# Patient Record
Sex: FEMALE | Race: WHITE | Employment: UNEMPLOYED | URBAN - METROPOLITAN AREA
[De-identification: names, ages, dates, MRNs, and addresses within clinical notes are randomized per-mention and may not be internally consistent; named-entity substitution may affect disease eponyms.]

---

## 2017-01-23 ENCOUNTER — TELEPHONE (OUTPATIENT)
Dept: FAMILY MEDICINE CLINIC | Facility: CLINIC | Age: 47
End: 2017-01-23

## 2017-01-23 RX ORDER — PREDNISONE 20 MG/1
TABLET ORAL DAILY
Qty: 30 TABLET | Refills: 0 | Status: SHIPPED | OUTPATIENT
Start: 2017-01-23 | End: 2017-01-30

## 2017-01-23 NOTE — TELEPHONE ENCOUNTER
Prednisone 20mg daily and fu w dr Tierra Coker it    If 20mg not work- rec 40mg daily    Ok to script prednsione 20mg at 30 tabs

## 2017-01-23 NOTE — TELEPHONE ENCOUNTER
Patient notified, verbalized understanding. Pharmacy location confirmed with patient and order placed.

## 2017-01-23 NOTE — TELEPHONE ENCOUNTER
Patient states that she had been seeing Dr. Jean Baptiste Mini rheum for her Lupus but he is in between jobs and she is unable to see him until the middle of February. She states that her lupus is acting up.  She is having heaviness in her chest which radiates to her sh

## 2017-03-07 ENCOUNTER — TELEPHONE (OUTPATIENT)
Dept: FAMILY MEDICINE CLINIC | Facility: CLINIC | Age: 47
End: 2017-03-07

## 2017-03-07 NOTE — TELEPHONE ENCOUNTER
Patient states starting 3/4/17 she started to experience full sensation in upper mid abdomen, after eating she has indigestion. Patient states it is not painful but just uncomfortable after eating.  Patient has not taken anything for indigestion at this juan

## 2017-03-11 ENCOUNTER — OFFICE VISIT (OUTPATIENT)
Dept: FAMILY MEDICINE CLINIC | Facility: CLINIC | Age: 47
End: 2017-03-11

## 2017-03-11 ENCOUNTER — LAB ENCOUNTER (OUTPATIENT)
Dept: LAB | Age: 47
End: 2017-03-11
Attending: PHYSICIAN ASSISTANT
Payer: COMMERCIAL

## 2017-03-11 VITALS
DIASTOLIC BLOOD PRESSURE: 60 MMHG | TEMPERATURE: 97 F | HEART RATE: 68 BPM | WEIGHT: 136 LBS | RESPIRATION RATE: 16 BRPM | BODY MASS INDEX: 22.94 KG/M2 | HEIGHT: 64.5 IN | SYSTOLIC BLOOD PRESSURE: 110 MMHG

## 2017-03-11 DIAGNOSIS — R10.13 EPIGASTRIC PAIN: ICD-10-CM

## 2017-03-11 DIAGNOSIS — R10.13 EPIGASTRIC PAIN: Primary | ICD-10-CM

## 2017-03-11 LAB
ALBUMIN SERPL-MCNC: 3.4 G/DL (ref 3.5–4.8)
ALP LIVER SERPL-CCNC: 68 U/L (ref 39–100)
ALT SERPL-CCNC: 17 U/L (ref 14–54)
AMYLASE: 75 U/L (ref 25–115)
AST SERPL-CCNC: 10 U/L (ref 15–41)
BASOPHILS # BLD AUTO: 0.08 X10(3) UL (ref 0–0.1)
BASOPHILS NFR BLD AUTO: 1.1 %
BILIRUB SERPL-MCNC: 0.4 MG/DL (ref 0.1–2)
BUN BLD-MCNC: 17 MG/DL (ref 8–20)
CALCIUM BLD-MCNC: 8.5 MG/DL (ref 8.3–10.3)
CHLORIDE: 107 MMOL/L (ref 101–111)
CO2: 29 MMOL/L (ref 22–32)
CREAT BLD-MCNC: 0.87 MG/DL (ref 0.55–1.02)
EOSINOPHIL # BLD AUTO: 0.07 X10(3) UL (ref 0–0.3)
EOSINOPHIL NFR BLD AUTO: 1 %
ERYTHROCYTE [DISTWIDTH] IN BLOOD BY AUTOMATED COUNT: 12.8 % (ref 11.5–16)
GLUCOSE BLD-MCNC: 72 MG/DL (ref 70–99)
HCT VFR BLD AUTO: 37.8 % (ref 34–50)
HGB BLD-MCNC: 12.9 G/DL (ref 12–16)
IMMATURE GRANULOCYTE COUNT: 0.06 X10(3) UL (ref 0–1)
IMMATURE GRANULOCYTE RATIO %: 0.8 %
LIPASE: 335 U/L (ref 73–393)
LYMPHOCYTES # BLD AUTO: 1.65 X10(3) UL (ref 0.9–4)
LYMPHOCYTES NFR BLD AUTO: 23.4 %
M PROTEIN MFR SERPL ELPH: 6.4 G/DL (ref 6.1–8.3)
MCH RBC QN AUTO: 30.7 PG (ref 27–33.2)
MCHC RBC AUTO-ENTMCNC: 34.1 G/DL (ref 31–37)
MCV RBC AUTO: 90 FL (ref 81–100)
MONOCYTES # BLD AUTO: 0.74 X10(3) UL (ref 0.1–0.6)
MONOCYTES NFR BLD AUTO: 10.5 %
NEUTROPHIL ABS PRELIM: 4.46 X10 (3) UL (ref 1.3–6.7)
NEUTROPHILS # BLD AUTO: 4.46 X10(3) UL (ref 1.3–6.7)
NEUTROPHILS NFR BLD AUTO: 63.2 %
PLATELET # BLD AUTO: 179 10(3)UL (ref 150–450)
POTASSIUM SERPL-SCNC: 3.9 MMOL/L (ref 3.6–5.1)
RBC # BLD AUTO: 4.2 X10(6)UL (ref 3.8–5.1)
RED CELL DISTRIBUTION WIDTH-SD: 41.7 FL (ref 35.1–46.3)
SODIUM SERPL-SCNC: 142 MMOL/L (ref 136–144)
WBC # BLD AUTO: 7.1 X10(3) UL (ref 4–13)

## 2017-03-11 PROCEDURE — 82150 ASSAY OF AMYLASE: CPT

## 2017-03-11 PROCEDURE — 83690 ASSAY OF LIPASE: CPT

## 2017-03-11 PROCEDURE — 36415 COLL VENOUS BLD VENIPUNCTURE: CPT

## 2017-03-11 PROCEDURE — 85025 COMPLETE CBC W/AUTO DIFF WBC: CPT

## 2017-03-11 PROCEDURE — 99213 OFFICE O/P EST LOW 20 MIN: CPT | Performed by: PHYSICIAN ASSISTANT

## 2017-03-11 PROCEDURE — 80053 COMPREHEN METABOLIC PANEL: CPT

## 2017-03-11 NOTE — PROGRESS NOTES
[facility] Internal Medicine Progress Note    CC:  Patient presents with:  Epigastric Pain: pressure with eating x 1 week       HPI:   HPI  Epigastric pain x 1 week  Started last Friday into Saturday with epigastric fullness after eating  It didn visit. Review of Systems :  Review of Systems   Constitutional: Negative for fever and chills. Respiratory: Negative for cough, chest tightness, shortness of breath and wheezing. Cardiovascular: Negative for chest pain.    Gastrointestinal: Positive Patient/Caregiver Education: Patient/Caregiver Education: There are no barriers to learning. Medical education done. Outcome: Patient verbalizes understanding.     Problem List:  Patient Active Problem List:     Mitral regurgitation     Antiphospholipid s

## 2017-03-11 NOTE — PATIENT INSTRUCTIONS
Thank you for choosing Romy Grande PA-C at [place] [address]  To Do: Brenda Castro  1. Pt to get abdominal ultrasound  2. Pt to continue omeprazole  3.  Pt to follow-up with GI, as scheduled    • Please signup for MY CHART, which is electronic healthier and to improve your quality of life.     Referrals, and Radiology Information:    If your insurance requires a referral to a specialist, please allow 5 business days to process your referral request.    If Yoseph Pepe PA-C orders a CT or MRI

## 2017-03-20 ENCOUNTER — HOSPITAL ENCOUNTER (OUTPATIENT)
Dept: ULTRASOUND IMAGING | Age: 47
Discharge: HOME | End: 2017-03-20
Attending: PHYSICIAN ASSISTANT
Payer: COMMERCIAL

## 2017-03-20 DIAGNOSIS — R10.13 EPIGASTRIC PAIN: ICD-10-CM

## 2017-03-20 PROCEDURE — 76700 US EXAM ABDOM COMPLETE: CPT

## 2017-03-28 ENCOUNTER — TELEPHONE (OUTPATIENT)
Dept: FAMILY MEDICINE CLINIC | Facility: CLINIC | Age: 47
End: 2017-03-28

## 2017-03-28 NOTE — TELEPHONE ENCOUNTER
Received consult notes from [facility] 03/21/17. Placed in MD inbox for review.

## 2017-05-25 ENCOUNTER — HOSPITAL ENCOUNTER (OUTPATIENT)
Dept: GENERAL RADIOLOGY | Facility: HOSPITAL | Age: 47
Discharge: HOME | End: 2017-05-25
Attending: INTERNAL MEDICINE
Payer: COMMERCIAL

## 2017-05-25 ENCOUNTER — RT VISIT (OUTPATIENT)
Dept: RESPIRATORY THERAPY | Facility: HOSPITAL | Age: 47
End: 2017-05-25
Attending: INTERNAL MEDICINE
Payer: COMMERCIAL

## 2017-05-25 DIAGNOSIS — J45.909 ASTHMA: ICD-10-CM

## 2017-05-25 DIAGNOSIS — D70.8 AUTO IMMUNE NEUTROPENIA (HCC): ICD-10-CM

## 2017-05-25 PROCEDURE — 94729 DIFFUSING CAPACITY: CPT

## 2017-05-25 PROCEDURE — 94010 BREATHING CAPACITY TEST: CPT

## 2017-05-25 PROCEDURE — 94726 PLETHYSMOGRAPHY LUNG VOLUMES: CPT

## 2017-05-25 PROCEDURE — 71020 XR CHEST PA + LAT CHEST (CPT=71020): CPT | Performed by: INTERNAL MEDICINE

## 2017-05-26 NOTE — PROCEDURES
Sapphire Clemens is a 61-year-old  female who stands 5 feet 4 inches tall and weighs 130 pounds. She underwent standard pulmonary function testing on 5/25/17. She carries a diagnosis of asthma.   She has a 14-pack-year smoking history but stopped

## 2017-06-08 PROBLEM — Z79.899 ENCOUNTER FOR LONG-TERM (CURRENT) DRUG USE: Status: ACTIVE | Noted: 2017-06-08

## 2017-06-20 ENCOUNTER — OFFICE VISIT (OUTPATIENT)
Dept: FAMILY MEDICINE CLINIC | Facility: CLINIC | Age: 47
End: 2017-06-20

## 2017-06-20 VITALS
SYSTOLIC BLOOD PRESSURE: 110 MMHG | HEART RATE: 70 BPM | TEMPERATURE: 99 F | DIASTOLIC BLOOD PRESSURE: 78 MMHG | BODY MASS INDEX: 22.09 KG/M2 | HEIGHT: 64.5 IN | WEIGHT: 131 LBS | RESPIRATION RATE: 16 BRPM

## 2017-06-20 DIAGNOSIS — R09.82 PND (POST-NASAL DRIP): ICD-10-CM

## 2017-06-20 DIAGNOSIS — R05.9 COUGH: Primary | ICD-10-CM

## 2017-06-20 PROCEDURE — 99213 OFFICE O/P EST LOW 20 MIN: CPT | Performed by: PHYSICIAN ASSISTANT

## 2017-06-20 RX ORDER — AMOXICILLIN 875 MG/1
875 TABLET, COATED ORAL 2 TIMES DAILY
Qty: 20 TABLET | Refills: 0 | Status: SHIPPED | OUTPATIENT
Start: 2017-06-20 | End: 2017-07-06

## 2017-06-20 NOTE — PROGRESS NOTES
[facility] Internal Medicine Progress Note    CC:  Patient presents with:  Cough: x 10 days      HPI:   HPI  Cough x 10 days  Pt is starting to feel a little better  It started with cold symptoms  And then a cough  She states on Friday she was fe Resp 16  Ht 64.5\"  Wt 131 lb  BMI 22.15 kg/m2 Body mass index is 22.15 kg/(m^2). Physical Exam   Constitutional: She is oriented to person, place, and time and well-developed, well-nourished, and in no distress.    HENT:   Right Ear: External ear normal. Hypercoagulable state, primary ([facility]     SLE (systemic lupus erythematosus) (HCC)     S/P hysterectomy     Internal hemorrhoid     Gastritis     H/O pulmonary embolus during pregnancy     Anxiety     H/O pericarditis     Numbness and tingling sensation of

## 2017-06-20 NOTE — PATIENT INSTRUCTIONS
Thank you for choosing Romy Grande PA-C at [place] [address]  To Do: Brenda Castro  1. Pt to continue medrol dose pack  2. Continue inhaler  3. Begin antibiotic as directed  4.  If symptoms persist or increase pt to call office  Effective 6/19/ those discussed today.  All therapies have potential risk of harm or side effects or medication interactions.  It is your duty and for your safety to discuss with the pharmacist and our office with questions, and to notify us and stop treatment if problems

## 2017-07-06 ENCOUNTER — LAB ENCOUNTER (OUTPATIENT)
Dept: LAB | Age: 47
End: 2017-07-06
Attending: INTERNAL MEDICINE
Payer: COMMERCIAL

## 2017-07-06 DIAGNOSIS — M32.9 SYSTEMIC LUPUS ERYTHEMATOSUS, UNSPECIFIED SLE TYPE, UNSPECIFIED ORGAN INVOLVEMENT STATUS (HCC): ICD-10-CM

## 2017-07-06 DIAGNOSIS — Z79.899 ENCOUNTER FOR LONG-TERM (CURRENT) DRUG USE: ICD-10-CM

## 2017-07-06 DIAGNOSIS — M79.671 RIGHT FOOT PAIN: ICD-10-CM

## 2017-07-06 LAB
ALBUMIN SERPL-MCNC: 3.9 G/DL (ref 3.5–4.8)
ALP LIVER SERPL-CCNC: 72 U/L (ref 39–100)
ALT SERPL-CCNC: 21 U/L (ref 14–54)
AST SERPL-CCNC: 15 U/L (ref 15–41)
BASOPHILS # BLD AUTO: 0.08 X10(3) UL (ref 0–0.1)
BASOPHILS NFR BLD AUTO: 0.6 %
BILIRUB SERPL-MCNC: 0.5 MG/DL (ref 0.1–2)
BUN BLD-MCNC: 21 MG/DL (ref 8–20)
C-REACTIVE PROTEIN: <0.29 MG/DL (ref ?–1)
CALCIUM BLD-MCNC: 8.8 MG/DL (ref 8.3–10.3)
CHLORIDE: 103 MMOL/L (ref 101–111)
CO2: 29 MMOL/L (ref 22–32)
COMPLEMENT C3: 76.6 MG/DL (ref 90–180)
COMPLEMENT C4: 20.3 MG/DL (ref 10–40)
CREAT BLD-MCNC: 0.88 MG/DL (ref 0.55–1.02)
EOSINOPHIL # BLD AUTO: 0.01 X10(3) UL (ref 0–0.3)
EOSINOPHIL NFR BLD AUTO: 0.1 %
ERYTHROCYTE [DISTWIDTH] IN BLOOD BY AUTOMATED COUNT: 12.7 % (ref 11.5–16)
GLUCOSE BLD-MCNC: 147 MG/DL (ref 70–99)
HCT VFR BLD AUTO: 43.7 % (ref 34–50)
HGB BLD-MCNC: 14.3 G/DL (ref 12–16)
IMMATURE GRANULOCYTE COUNT: 0.09 X10(3) UL (ref 0–1)
IMMATURE GRANULOCYTE RATIO %: 0.6 %
LYMPHOCYTES # BLD AUTO: 0.81 X10(3) UL (ref 0.9–4)
LYMPHOCYTES NFR BLD AUTO: 5.7 %
M PROTEIN MFR SERPL ELPH: 7.4 G/DL (ref 6.1–8.3)
MCH RBC QN AUTO: 29.6 PG (ref 27–33.2)
MCHC RBC AUTO-ENTMCNC: 32.7 G/DL (ref 31–37)
MCV RBC AUTO: 90.5 FL (ref 81–100)
MONOCYTES # BLD AUTO: 0.43 X10(3) UL (ref 0.1–0.6)
MONOCYTES NFR BLD AUTO: 3 %
NEUTROPHIL ABS PRELIM: 12.87 X10 (3) UL (ref 1.3–6.7)
NEUTROPHILS # BLD AUTO: 12.87 X10(3) UL (ref 1.3–6.7)
NEUTROPHILS NFR BLD AUTO: 90 %
PLATELET # BLD AUTO: 214 10(3)UL (ref 150–450)
POTASSIUM SERPL-SCNC: 4 MMOL/L (ref 3.6–5.1)
RBC # BLD AUTO: 4.83 X10(6)UL (ref 3.8–5.1)
RED CELL DISTRIBUTION WIDTH-SD: 41.9 FL (ref 35.1–46.3)
SODIUM SERPL-SCNC: 137 MMOL/L (ref 136–144)
WBC # BLD AUTO: 14.3 X10(3) UL (ref 4–13)

## 2017-07-06 PROCEDURE — 86160 COMPLEMENT ANTIGEN: CPT

## 2017-07-06 PROCEDURE — 83516 IMMUNOASSAY NONANTIBODY: CPT

## 2017-07-06 PROCEDURE — 82570 ASSAY OF URINE CREATININE: CPT

## 2017-07-06 PROCEDURE — 86225 DNA ANTIBODY NATIVE: CPT

## 2017-07-06 PROCEDURE — 86235 NUCLEAR ANTIGEN ANTIBODY: CPT

## 2017-07-06 PROCEDURE — 86140 C-REACTIVE PROTEIN: CPT

## 2017-07-06 PROCEDURE — 36415 COLL VENOUS BLD VENIPUNCTURE: CPT

## 2017-07-06 PROCEDURE — 84156 ASSAY OF PROTEIN URINE: CPT

## 2017-07-06 PROCEDURE — 86038 ANTINUCLEAR ANTIBODIES: CPT

## 2017-07-06 PROCEDURE — 85025 COMPLETE CBC W/AUTO DIFF WBC: CPT

## 2017-07-06 PROCEDURE — 80053 COMPREHEN METABOLIC PANEL: CPT

## 2017-07-07 LAB
CREAT UR-SCNC: 151 MG/DL
PROT UR-MCNC: 17.9 MG/DL
RNP AUTOAB: <100 AU/ML (ref ?–100)
SM AUTOAB (SMITH): <100 AU/ML (ref ?–100)
SSA AUTOAB: <100 AU/ML (ref ?–100)
SSB AUTOAB: <100 AU/ML (ref ?–100)

## 2017-07-10 LAB — DOUBLE-STRANDED DNA (DSDNA) AB IGG ELISA: DETECTED

## 2017-07-12 LAB — CHROMATIN ANTIBODY,IGG: 45 UNITS

## 2017-09-09 ENCOUNTER — APPOINTMENT (OUTPATIENT)
Dept: CT IMAGING | Age: 47
End: 2017-09-09
Attending: EMERGENCY MEDICINE
Payer: COMMERCIAL

## 2017-09-09 ENCOUNTER — HOSPITAL ENCOUNTER (EMERGENCY)
Age: 47
Discharge: HOME | End: 2017-09-09
Attending: EMERGENCY MEDICINE
Payer: COMMERCIAL

## 2017-09-09 VITALS
SYSTOLIC BLOOD PRESSURE: 99 MMHG | HEART RATE: 71 BPM | RESPIRATION RATE: 18 BRPM | OXYGEN SATURATION: 100 % | BODY MASS INDEX: 22.2 KG/M2 | DIASTOLIC BLOOD PRESSURE: 45 MMHG | HEIGHT: 64 IN | TEMPERATURE: 98 F | WEIGHT: 130 LBS

## 2017-09-09 DIAGNOSIS — N20.0 KIDNEY STONE: Primary | ICD-10-CM

## 2017-09-09 LAB
ALBUMIN SERPL-MCNC: 3.6 G/DL (ref 3.5–4.8)
ALP LIVER SERPL-CCNC: 68 U/L (ref 39–100)
ALT SERPL-CCNC: 27 U/L (ref 14–54)
AST SERPL-CCNC: 16 U/L (ref 15–41)
BASOPHILS # BLD AUTO: 0.05 X10(3) UL (ref 0–0.1)
BASOPHILS NFR BLD AUTO: 0.4 %
BILIRUB SERPL-MCNC: 0.3 MG/DL (ref 0.1–2)
BILIRUB UR QL STRIP.AUTO: NEGATIVE
BUN BLD-MCNC: 20 MG/DL (ref 8–20)
CALCIUM BLD-MCNC: 8.3 MG/DL (ref 8.3–10.3)
CHLORIDE: 105 MMOL/L (ref 101–111)
CO2: 26 MMOL/L (ref 22–32)
COLOR UR AUTO: YELLOW
CREAT BLD-MCNC: 0.85 MG/DL (ref 0.55–1.02)
EOSINOPHIL # BLD AUTO: 0.01 X10(3) UL (ref 0–0.3)
EOSINOPHIL NFR BLD AUTO: 0.1 %
ERYTHROCYTE [DISTWIDTH] IN BLOOD BY AUTOMATED COUNT: 12.7 % (ref 11.5–16)
GLUCOSE BLD-MCNC: 121 MG/DL (ref 70–99)
GLUCOSE UR STRIP.AUTO-MCNC: NEGATIVE MG/DL
HCT VFR BLD AUTO: 36.7 % (ref 34–50)
HGB BLD-MCNC: 12.5 G/DL (ref 12–16)
IMMATURE GRANULOCYTE COUNT: 0.06 X10(3) UL (ref 0–1)
IMMATURE GRANULOCYTE RATIO %: 0.5 %
KETONES UR STRIP.AUTO-MCNC: NEGATIVE MG/DL
LYMPHOCYTES # BLD AUTO: 0.86 X10(3) UL (ref 0.9–4)
LYMPHOCYTES NFR BLD AUTO: 7.2 %
M PROTEIN MFR SERPL ELPH: 6.6 G/DL (ref 6.1–8.3)
MCH RBC QN AUTO: 29.6 PG (ref 27–33.2)
MCHC RBC AUTO-ENTMCNC: 34.1 G/DL (ref 31–37)
MCV RBC AUTO: 87 FL (ref 81–100)
MONOCYTES # BLD AUTO: 0.77 X10(3) UL (ref 0.1–0.6)
MONOCYTES NFR BLD AUTO: 6.5 %
NEUTROPHIL ABS PRELIM: 10.13 X10 (3) UL (ref 1.3–6.7)
NEUTROPHILS # BLD AUTO: 10.13 X10(3) UL (ref 1.3–6.7)
NEUTROPHILS NFR BLD AUTO: 85.3 %
NITRITE UR QL STRIP.AUTO: NEGATIVE
PH UR STRIP.AUTO: 5.5 [PH] (ref 4.5–8)
PLATELET # BLD AUTO: 174 10(3)UL (ref 150–450)
POTASSIUM SERPL-SCNC: 3.5 MMOL/L (ref 3.6–5.1)
RBC # BLD AUTO: 4.22 X10(6)UL (ref 3.8–5.1)
RBC #/AREA URNS AUTO: >10 /HPF
RED CELL DISTRIBUTION WIDTH-SD: 40.2 FL (ref 35.1–46.3)
SODIUM SERPL-SCNC: 139 MMOL/L (ref 136–144)
SP GR UR STRIP.AUTO: 1.02 (ref 1–1.03)
UROBILINOGEN UR STRIP.AUTO-MCNC: 0.2 MG/DL
WBC # BLD AUTO: 11.9 X10(3) UL (ref 4–13)

## 2017-09-09 PROCEDURE — 96376 TX/PRO/DX INJ SAME DRUG ADON: CPT

## 2017-09-09 PROCEDURE — 87086 URINE CULTURE/COLONY COUNT: CPT | Performed by: EMERGENCY MEDICINE

## 2017-09-09 PROCEDURE — 81001 URINALYSIS AUTO W/SCOPE: CPT | Performed by: EMERGENCY MEDICINE

## 2017-09-09 PROCEDURE — 96375 TX/PRO/DX INJ NEW DRUG ADDON: CPT

## 2017-09-09 PROCEDURE — 80053 COMPREHEN METABOLIC PANEL: CPT

## 2017-09-09 PROCEDURE — 80053 COMPREHEN METABOLIC PANEL: CPT | Performed by: EMERGENCY MEDICINE

## 2017-09-09 PROCEDURE — 85025 COMPLETE CBC W/AUTO DIFF WBC: CPT

## 2017-09-09 PROCEDURE — 74176 CT ABD & PELVIS W/O CONTRAST: CPT | Performed by: EMERGENCY MEDICINE

## 2017-09-09 PROCEDURE — 96361 HYDRATE IV INFUSION ADD-ON: CPT

## 2017-09-09 PROCEDURE — 96374 THER/PROPH/DIAG INJ IV PUSH: CPT

## 2017-09-09 PROCEDURE — 99285 EMERGENCY DEPT VISIT HI MDM: CPT

## 2017-09-09 PROCEDURE — 99284 EMERGENCY DEPT VISIT MOD MDM: CPT

## 2017-09-09 PROCEDURE — 85025 COMPLETE CBC W/AUTO DIFF WBC: CPT | Performed by: EMERGENCY MEDICINE

## 2017-09-09 RX ORDER — MORPHINE SULFATE 2 MG/ML
2 INJECTION, SOLUTION INTRAMUSCULAR; INTRAVENOUS ONCE
Status: COMPLETED | OUTPATIENT
Start: 2017-09-09 | End: 2017-09-09

## 2017-09-09 RX ORDER — KETOROLAC TROMETHAMINE 30 MG/ML
30 INJECTION, SOLUTION INTRAMUSCULAR; INTRAVENOUS ONCE
Status: COMPLETED | OUTPATIENT
Start: 2017-09-09 | End: 2017-09-09

## 2017-09-09 RX ORDER — ONDANSETRON 4 MG/1
4 TABLET, ORALLY DISINTEGRATING ORAL EVERY 4 HOURS PRN
Qty: 10 TABLET | Refills: 0 | Status: SHIPPED | OUTPATIENT
Start: 2017-09-09 | End: 2017-09-16

## 2017-09-09 RX ORDER — HYDROCODONE BITARTRATE AND ACETAMINOPHEN 5; 325 MG/1; MG/1
1-2 TABLET ORAL EVERY 4 HOURS PRN
Qty: 20 TABLET | Refills: 0 | Status: SHIPPED | OUTPATIENT
Start: 2017-09-09 | End: 2017-09-16

## 2017-09-09 RX ORDER — ONDANSETRON 2 MG/ML
4 INJECTION INTRAMUSCULAR; INTRAVENOUS ONCE
Status: COMPLETED | OUTPATIENT
Start: 2017-09-09 | End: 2017-09-09

## 2017-09-09 NOTE — ED NOTES
PT RETURNED FROM CT ON CART PER TECH. DENIES NEW OR WORSENING C/O. PT DECLINES ANY OTHER PAIN MEDICATION AT THIS TIME.

## 2017-09-09 NOTE — ED PROVIDER NOTES
Patient Seen in: THE [facility] Emergency Department In [place]    History   Patient presents with:  Abdomen/Flank Pain (GI/)    Stated Complaint: LEFT FLANK    HPI    Patient is a 56-year-old female who states yesterday she felt the urge to urinate through 16:   25-28WKS FETAL ZEINA  2008:   2009: COLONOSCOPY  2013: COLONOSCOPY      Comment: garret  : HYSTERECTOMY  2016: NEEDLE BIOPSY RIGHT Right      Comment: US guided biopsy - benign **no clip was                deployed**  No date: O (Oral)   Resp 18   Ht 162.6 cm (5' 4\")   Wt 59 kg   SpO2 100%   BMI 22.31 kg/m²         Physical Exam  GENERAL: Patient resting comfortably on the cart in no acute distress.   HEENT: Extraocular muscles intact, pupils equal round reactive to light and acco CBC W/ DIFFERENTIAL[[id number]]          Abnormal            Final result                 Please view results for these tests on the individual orders.    URINE CULTURE, ROUTINE     CT kidney stone protocol shows 6 mm proximal left ureteral s

## 2017-09-09 NOTE — ED NOTES
Pt ambulated with a steady gait to bathroom. Pt requesting more morphine. Sonny Lara per Dr. Mary Fountain for Morphine 2 mg IVP with pt's BP 98/62. Will continue to monitor and update. Lights dimmed in pt's room for comfort.

## 2017-09-10 ENCOUNTER — TELEPHONE (OUTPATIENT)
Dept: FAMILY MEDICINE CLINIC | Facility: CLINIC | Age: 47
End: 2017-09-10

## 2017-09-10 NOTE — TELEPHONE ENCOUNTER
Late entry: ON-Call message from 9/9/17 at 5:00am  Pt c/o of severe left sided pain overnight- 9/10, yesterday had urge to urinate all day. Took ibuprofen w/ little results.   +vomit X2-3 times overnight, no f/chills, no blood in urine, no N/D  This provide

## 2017-09-11 PROBLEM — N20.0 RENAL STONE: Status: ACTIVE | Noted: 2017-09-11

## 2017-09-12 ENCOUNTER — TELEPHONE (OUTPATIENT)
Dept: FAMILY MEDICINE CLINIC | Facility: CLINIC | Age: 47
End: 2017-09-12

## 2017-09-12 ENCOUNTER — TELEPHONE (OUTPATIENT)
Dept: HEMATOLOGY/ONCOLOGY | Facility: HOSPITAL | Age: 47
End: 2017-09-12

## 2017-09-12 PROCEDURE — 87086 URINE CULTURE/COLONY COUNT: CPT | Performed by: UROLOGY

## 2017-09-12 NOTE — TELEPHONE ENCOUNTER
MD Dave Brock, RN   Caller: Unspecified (Today, 12:13 PM)             No, just has to ambulate right after procedure and remain active      Spoke to patient, verbalized understanding.

## 2017-09-12 NOTE — TELEPHONE ENCOUNTER
Please relay to patient no need for stress dose steroids    i have reviewed the evidence and this case.   In my opinion she doesn't need stress dose steroids in this instance.     I recommend to just stay on her daily dose of prednisone     I site   In 1995

## 2017-09-12 NOTE — TELEPHONE ENCOUNTER
i have reviewed the evidence and this case. In my opinion she doesn't need stress dose steroids in this instance.     I recommend to just stay on her daily dose of prednisone    I site   In 1995, Irma et al followed 28 patients on chronic prednisone (1

## 2017-09-12 NOTE — TELEPHONE ENCOUNTER
Patient is going to have left ureteroscopy, stone extractions, ureter stent placement and laser lithotripsy (hoping to do this on Thursday) and is worried about getting a blood clot because of her hypercoagulable state.  She would like to know if she needs

## 2017-09-12 NOTE — TELEPHONE ENCOUNTER
Time started: 345p    Time ended: 351    Total time spent on chart: 6 min    Patient is scheduled for kidney stone removal with Dr. Michelle Rashid on Monday,left ureteroscopy, stone extractions, ureter stent placement and laser lithotripsy   Patient has lupus and is

## 2017-09-13 ENCOUNTER — PATIENT MESSAGE (OUTPATIENT)
Dept: FAMILY MEDICINE CLINIC | Facility: CLINIC | Age: 47
End: 2017-09-13

## 2017-09-13 DIAGNOSIS — M99.9 NONALLOPATHIC LESION OF HIP REGION: Primary | ICD-10-CM

## 2017-09-13 NOTE — TELEPHONE ENCOUNTER
From: Rick Painting  To: John Hauser MD  Sent: 9/13/2017 9:57 AM CDT  Subject: Test Results Question    Dr. Carmen Amin,  I was just looking over my x-ray report from 9/12 by Dr. Huong Nails, and it mentioned. Sumit Payan Sclerotic lesion within the left subtrochanteric femur.

## 2017-09-13 NOTE — TELEPHONE ENCOUNTER
Time started: 0905  Time ended: 0906  Total time spent on chart: one min      Patient notified she does not need to alter steroid use. May continue and have procedure.

## 2017-09-14 NOTE — TELEPHONE ENCOUNTER
Something in the L hip  Needs xray of L hip    Ordered  Tell her to do it first and will need an orthopedist eval if it's really there

## 2017-09-14 NOTE — TELEPHONE ENCOUNTER
From: Ferny Gabriel  To: Joe Gutiérrez MD  Sent: 9/13/2017 2:04 PM CDT  Subject: Other    [place]  Dr. Tereso Noguera is a Urologist locating kidney stones for removal. I was hoping Stanley would advise me.      RE: Test Results Question  Mir Guteirrez    Please contact

## 2017-09-14 NOTE — TELEPHONE ENCOUNTER
NIMA done 9/12/17 per Dr. Maya Walton.  Patient was reviewing this result and wanted you to review as well. It is noted that results were discussed with patient already per their office - no mention of patient's concern.   Do you want her to make appointment with y

## 2017-09-18 ENCOUNTER — HOSPITAL (OUTPATIENT)
Dept: OTHER | Age: 47
End: 2017-09-18
Attending: UROLOGY

## 2017-09-30 ENCOUNTER — HOSPITAL ENCOUNTER (OUTPATIENT)
Dept: GENERAL RADIOLOGY | Age: 47
Discharge: HOME | End: 2017-09-30
Attending: UROLOGY
Payer: COMMERCIAL

## 2017-09-30 DIAGNOSIS — N20.0 KIDNEY STONE: ICD-10-CM

## 2017-09-30 PROCEDURE — 74000 XR ABDOMEN (1 VIEW) (CPT=74000): CPT | Performed by: UROLOGY

## 2017-11-10 ENCOUNTER — HOSPITAL ENCOUNTER (OUTPATIENT)
Dept: ULTRASOUND IMAGING | Age: 47
Discharge: HOME | End: 2017-11-10
Attending: UROLOGY
Payer: COMMERCIAL

## 2017-11-10 ENCOUNTER — HOSPITAL ENCOUNTER (OUTPATIENT)
Dept: GENERAL RADIOLOGY | Age: 47
Discharge: HOME | End: 2017-11-10
Attending: INTERNAL MEDICINE
Payer: COMMERCIAL

## 2017-11-10 DIAGNOSIS — M99.9 NONALLOPATHIC LESION OF HIP REGION: ICD-10-CM

## 2017-11-10 DIAGNOSIS — N13.2 URETERAL STONE WITH HYDRONEPHROSIS: ICD-10-CM

## 2017-11-10 DIAGNOSIS — N20.0 KIDNEY STONE: ICD-10-CM

## 2017-11-10 PROCEDURE — 73502 X-RAY EXAM HIP UNI 2-3 VIEWS: CPT | Performed by: INTERNAL MEDICINE

## 2017-11-10 PROCEDURE — 76775 US EXAM ABDO BACK WALL LIM: CPT | Performed by: UROLOGY

## 2018-01-22 ENCOUNTER — OFFICE VISIT (OUTPATIENT)
Dept: FAMILY MEDICINE CLINIC | Facility: CLINIC | Age: 48
End: 2018-01-22

## 2018-01-22 VITALS
HEIGHT: 64.5 IN | WEIGHT: 138 LBS | RESPIRATION RATE: 17 BRPM | TEMPERATURE: 98 F | OXYGEN SATURATION: 93 % | SYSTOLIC BLOOD PRESSURE: 110 MMHG | DIASTOLIC BLOOD PRESSURE: 70 MMHG | HEART RATE: 90 BPM | BODY MASS INDEX: 23.27 KG/M2

## 2018-01-22 DIAGNOSIS — J98.9 REACTIVE AIRWAY DISEASE THAT IS NOT ASTHMA: ICD-10-CM

## 2018-01-22 DIAGNOSIS — J06.9 VIRAL URI WITH COUGH: Primary | ICD-10-CM

## 2018-01-22 PROCEDURE — 99213 OFFICE O/P EST LOW 20 MIN: CPT | Performed by: FAMILY MEDICINE

## 2018-01-22 RX ORDER — CHOLECALCIFEROL (VITAMIN D3) 125 MCG
CAPSULE ORAL
COMMUNITY
End: 2018-01-22

## 2018-01-22 RX ORDER — DEXTROMETHORPHAN HYDROBROMIDE AND PROMETHAZINE HYDROCHLORIDE 15; 6.25 MG/5ML; MG/5ML
5 SYRUP ORAL 4 TIMES DAILY PRN
Qty: 240 ML | Refills: 0 | Status: SHIPPED | OUTPATIENT
Start: 2018-01-22 | End: 2018-02-20

## 2018-01-22 RX ORDER — PREDNISONE 1 MG/1
5 TABLET ORAL DAILY
COMMUNITY
End: 2018-02-08

## 2018-01-22 RX ORDER — MYCOPHENOLATE MOFETIL 500 MG/1
TABLET ORAL
Refills: 5 | COMMUNITY
Start: 2018-01-02 | End: 2018-07-05

## 2018-01-22 RX ORDER — METHYLPREDNISOLONE 4 MG/1
TABLET ORAL
Qty: 1 KIT | Refills: 0 | Status: SHIPPED | OUTPATIENT
Start: 2018-01-22 | End: 2018-02-20

## 2018-01-23 NOTE — PROGRESS NOTES
[facility] Visit Note  1/22/2018      Subjective:      Patient ID: Rick Painting is a 53year old female. Chief Complaint:  Patient presents with:  Cough: chest congestion x 1 wk.  States she coughs so hard at times has trouble catching her Dispense: 1 kit; Refill: 0      -if her sxs worsen or aren't improving consider starting z-luciana, she may call for this. Return if symptoms worsen or fail to improve.

## 2018-02-20 ENCOUNTER — MED REC SCAN ONLY (OUTPATIENT)
Dept: FAMILY MEDICINE CLINIC | Facility: CLINIC | Age: 48
End: 2018-02-20

## 2018-02-20 PROBLEM — M77.41 METATARSALGIA OF RIGHT FOOT: Status: ACTIVE | Noted: 2018-02-20

## 2018-03-20 ENCOUNTER — HOSPITAL ENCOUNTER (OUTPATIENT)
Dept: MAMMOGRAPHY | Age: 48
Discharge: HOME | End: 2018-03-20
Attending: OBSTETRICS & GYNECOLOGY
Payer: COMMERCIAL

## 2018-03-20 DIAGNOSIS — Z12.31 VISIT FOR SCREENING MAMMOGRAM: ICD-10-CM

## 2018-03-20 PROCEDURE — 77067 SCR MAMMO BI INCL CAD: CPT | Performed by: OBSTETRICS & GYNECOLOGY

## 2018-03-20 PROCEDURE — 77063 BREAST TOMOSYNTHESIS BI: CPT | Performed by: OBSTETRICS & GYNECOLOGY

## 2018-08-27 ENCOUNTER — OFFICE VISIT (OUTPATIENT)
Dept: FAMILY MEDICINE CLINIC | Facility: CLINIC | Age: 48
End: 2018-08-27
Payer: COMMERCIAL

## 2018-08-27 ENCOUNTER — TELEPHONE (OUTPATIENT)
Dept: FAMILY MEDICINE CLINIC | Facility: CLINIC | Age: 48
End: 2018-08-27

## 2018-08-27 VITALS
DIASTOLIC BLOOD PRESSURE: 68 MMHG | BODY MASS INDEX: 23.27 KG/M2 | HEART RATE: 76 BPM | WEIGHT: 138 LBS | HEIGHT: 64.5 IN | RESPIRATION RATE: 16 BRPM | SYSTOLIC BLOOD PRESSURE: 110 MMHG

## 2018-08-27 DIAGNOSIS — D68.61 ANTIPHOSPHOLIPID SYNDROME (HCC): ICD-10-CM

## 2018-08-27 DIAGNOSIS — Z01.89 ROUTINE LAB DRAW: ICD-10-CM

## 2018-08-27 DIAGNOSIS — Z11.1 SCREENING FOR TUBERCULOSIS: ICD-10-CM

## 2018-08-27 DIAGNOSIS — Z00.00 WELLNESS EXAMINATION: Primary | ICD-10-CM

## 2018-08-27 DIAGNOSIS — M32.8 OTHER FORMS OF SYSTEMIC LUPUS ERYTHEMATOSUS, UNSPECIFIED ORGAN INVOLVEMENT STATUS (HCC): ICD-10-CM

## 2018-08-27 PROCEDURE — 99396 PREV VISIT EST AGE 40-64: CPT | Performed by: PHYSICIAN ASSISTANT

## 2018-08-27 PROCEDURE — 86580 TB INTRADERMAL TEST: CPT | Performed by: PHYSICIAN ASSISTANT

## 2018-08-27 RX ORDER — HYDROXYCHLOROQUINE SULFATE 200 MG/1
1 TABLET, FILM COATED ORAL DAILY
COMMUNITY
Start: 2013-01-21 | End: 2019-07-19

## 2018-08-27 NOTE — PATIENT INSTRUCTIONS
Thank you for choosing Michell Staton PA-C at [place] [address]  To Do: Brenda Castro  1. Pt to continue medications  2. Get lab work done  3.  Follow-up around 6 months as needed    • Please signup for [facility] CHART, which is electronic access to your testing, please call Central Scheduling at [phone]  Please allow our office 5 business days to contact you regarding any testing results.     Refill policies:   Allow 3 business days for refills; controlled substances may take longer and must be picked

## 2018-08-27 NOTE — PROGRESS NOTES
REASON FOR VISIT:    Vivian Shah is a 53year old female who presents for an [address]     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 Andrzej Tafoya  She is on prednisone, plaquenil and mycophenolate mofetil  She all  Feeling down, depressed, or hopeless (over the last two weeks)?: Not at all  PHQ-2 SCORE: 0         PREVENTATIVE SERVICES  INDICATIONS AND SCHEDULE Recommendation Internal Lab or Procedure   Colonoscopy Screen Every 10 years Colonoscopy due on 11/25/2 (4 mg total) by mouth daily. Disp: 360 tablet Rfl: 0   Mycophenolate Mofetil 500 MG Oral Tab TAKE 1 TABLET (500 MG TOTAL) BY MOUTH ONCE DAILY. Disp:  Rfl: 5   Ergocalciferol (VITAMIN D OR) Take by mouth.  Drops daily Disp:  Rfl:    Albuterol Sulfate HFA (TX biopsy - benign **no clip was                deployed**  No date: OTHER      Comment: hsyteroscopy w insert of device to occlude                fallopian tubes  June 2011: OTHER      Comment: Endoscopy  No date: OTHER SURGICAL HISTORY      Comment: CAROL[place] Encounters:  08/27/18 : 110/68  07/05/18 : 102/62  02/20/18 : 114/78    No LMP recorded. Patient is not currently having periods (Reason: Partial Hysterectomy).     GENERAL: well developed, well nourished, in no apparent distress  SKIN: no rashes, no suspic no preventive care reminders to display for this patient.   Shingles:     Influenza Annually   Pneumococcal if high risk   Td/Tdap once then every 10 years   HPV Males 11-21   Zoster (Shingles) 60 and older: one dose   Varicella 2 doses if not immune   MMR

## 2018-08-27 NOTE — TELEPHONE ENCOUNTER
Pt saw Flo Mason today for employee physical. TB skin test placed on L forearm. Pt will be coming in on Wednesday, 8/29/18, for a nurse visit TB read. Paperwork is in blue \"Patient Paperwork\" folder by my desk.

## 2018-08-29 ENCOUNTER — NURSE ONLY (OUTPATIENT)
Dept: FAMILY MEDICINE CLINIC | Facility: CLINIC | Age: 48
End: 2018-08-29
Payer: COMMERCIAL

## 2018-08-29 LAB — INDURATION (): 0 MM (ref 0–11)

## 2018-08-29 NOTE — PROGRESS NOTES
Tb Intradermal Test 8/27/2018 0.1 mL N/A INTRADERMAL   Site: Left Forearm   Given By: Nay Bunch MA     0.0 mm induration to left forearm, negative/normal result. Patient provided with a copy.  Form faxed to  per patient request, confirmation rec

## 2018-09-24 ENCOUNTER — OFFICE VISIT (OUTPATIENT)
Dept: FAMILY MEDICINE CLINIC | Facility: CLINIC | Age: 48
End: 2018-09-24
Payer: COMMERCIAL

## 2018-09-24 VITALS
SYSTOLIC BLOOD PRESSURE: 116 MMHG | WEIGHT: 133 LBS | HEART RATE: 66 BPM | RESPIRATION RATE: 16 BRPM | HEIGHT: 64.5 IN | BODY MASS INDEX: 22.43 KG/M2 | TEMPERATURE: 98 F | DIASTOLIC BLOOD PRESSURE: 72 MMHG

## 2018-09-24 DIAGNOSIS — S39.011A STRAIN OF RECTUS ABDOMINIS MUSCLE, INITIAL ENCOUNTER: Primary | ICD-10-CM

## 2018-09-24 PROCEDURE — 99213 OFFICE O/P EST LOW 20 MIN: CPT | Performed by: FAMILY MEDICINE

## 2018-09-24 NOTE — PROGRESS NOTES
HPI:    Patient ID: Rach Jason is a 53year old female. HPI  Ms. Santino Saleh is a pleasant 43-year-old female with history of lupus erythematosus for which she sees rheumatology, history of PE during pregnancy, pericarditis prior to her being diagno and moist. No oropharyngeal exudate. Eyes: Conjunctivae are normal. No scleral icterus. Neck: Neck supple. No thyromegaly present. Cardiovascular: Normal rate, regular rhythm and normal heart sounds. No murmur heard.   Pulmonary/Chest: Effort normal

## 2018-09-24 NOTE — PATIENT INSTRUCTIONS
Thank you for choosing Leticia Ann MD at [place] [address]  To Do: Brenda Castro  1. Please take meds as directed. 2.  May take over-the-counter Advil or Aleve as needed for pain    [place] is located in [address].   Monday, Tuesd problems arise, but know that our intention is that the benefits outweigh those potential risks and we strive to make you healthier and to improve your quality of life.     Referrals, and Radiology Information:    If your insurance requires a referral to a wrapping it with a thin towel. Be careful not to injure your skin with the ice treatments. Ice should never be applied directly to skin. Continue the use of ice packs for relief of pain and swelling as needed.  After 48 hours, apply heat (warm shower or war

## 2018-10-26 ENCOUNTER — APPOINTMENT (OUTPATIENT)
Dept: HEMATOLOGY/ONCOLOGY | Age: 48
End: 2018-10-26
Attending: INTERNAL MEDICINE
Payer: COMMERCIAL

## 2018-10-26 DIAGNOSIS — Z86.718 HISTORY OF DVT OF LOWER EXTREMITY: ICD-10-CM

## 2018-10-26 PROCEDURE — 85378 FIBRIN DEGRADE SEMIQUANT: CPT

## 2018-10-26 PROCEDURE — 36415 COLL VENOUS BLD VENIPUNCTURE: CPT

## 2018-12-11 ENCOUNTER — OFFICE VISIT (OUTPATIENT)
Dept: FAMILY MEDICINE CLINIC | Facility: CLINIC | Age: 48
End: 2018-12-11
Payer: COMMERCIAL

## 2018-12-11 ENCOUNTER — LAB ENCOUNTER (OUTPATIENT)
Dept: LAB | Age: 48
End: 2018-12-11
Attending: FAMILY MEDICINE
Payer: COMMERCIAL

## 2018-12-11 VITALS
OXYGEN SATURATION: 98 % | RESPIRATION RATE: 16 BRPM | BODY MASS INDEX: 22.26 KG/M2 | HEIGHT: 64.5 IN | SYSTOLIC BLOOD PRESSURE: 120 MMHG | HEART RATE: 70 BPM | DIASTOLIC BLOOD PRESSURE: 80 MMHG | TEMPERATURE: 98 F | WEIGHT: 132 LBS

## 2018-12-11 DIAGNOSIS — R07.89 OTHER CHEST PAIN: Primary | ICD-10-CM

## 2018-12-11 DIAGNOSIS — R07.89 OTHER CHEST PAIN: ICD-10-CM

## 2018-12-11 PROCEDURE — 36415 COLL VENOUS BLD VENIPUNCTURE: CPT

## 2018-12-11 PROCEDURE — 99213 OFFICE O/P EST LOW 20 MIN: CPT | Performed by: FAMILY MEDICINE

## 2018-12-11 PROCEDURE — 85378 FIBRIN DEGRADE SEMIQUANT: CPT

## 2018-12-11 RX ORDER — METHYLPREDNISOLONE 4 MG/1
TABLET ORAL
Qty: 1 PACKAGE | Refills: 1 | Status: SHIPPED | OUTPATIENT
Start: 2018-12-11 | End: 2019-01-28 | Stop reason: ALTCHOICE

## 2018-12-11 NOTE — PROGRESS NOTES
HPI:    Patient ID: Cody Rousseau is a 53year old female. HPI  Ms. Zohra Llanes is a pleasant 72-year-old female with known history of lupus, clotting disorder, history of PE in 2007 not on any anticoagulation currently here today for sudden left-sided hours as needed for Wheezing. Disp: 1 Inhaler Rfl: 6     Allergies:  Doxycycline                 Comment:Sensitivity to light   PHYSICAL EXAM:   Physical Exam   Constitutional: No distress.    HENT:   Mouth/Throat: Oropharynx is clear and moist. No orophary

## 2018-12-11 NOTE — PATIENT INSTRUCTIONS
.Thank you for choosing Amparo Whitley MD at [place] [address]  To Do: Brenda Castro  1. Please take meds as directed. 2.  Please go to emergency room if symptoms worsen  [facility] is located in [address].   Monday, Tuesday & Friday – 8 know that our intention is that the benefits outweigh those potential risks and we strive to make you healthier and to improve your quality of life.     Referrals, and Radiology Information:    If your insurance requires a referral to a specialist, please a

## 2019-01-28 ENCOUNTER — LAB ENCOUNTER (OUTPATIENT)
Dept: LAB | Age: 49
End: 2019-01-28
Attending: PHYSICIAN ASSISTANT
Payer: COMMERCIAL

## 2019-01-28 ENCOUNTER — OFFICE VISIT (OUTPATIENT)
Dept: FAMILY MEDICINE CLINIC | Facility: CLINIC | Age: 49
End: 2019-01-28
Payer: COMMERCIAL

## 2019-01-28 VITALS
BODY MASS INDEX: 23.1 KG/M2 | SYSTOLIC BLOOD PRESSURE: 120 MMHG | HEIGHT: 64.5 IN | HEART RATE: 84 BPM | WEIGHT: 137 LBS | DIASTOLIC BLOOD PRESSURE: 80 MMHG | RESPIRATION RATE: 16 BRPM

## 2019-01-28 DIAGNOSIS — R07.81 RIB PAIN ON RIGHT SIDE: Primary | ICD-10-CM

## 2019-01-28 DIAGNOSIS — R07.81 RIB PAIN ON RIGHT SIDE: ICD-10-CM

## 2019-01-28 LAB — D-DIMER: <0.27 UG/ML FEU (ref 0–0.49)

## 2019-01-28 PROCEDURE — 85378 FIBRIN DEGRADE SEMIQUANT: CPT

## 2019-01-28 PROCEDURE — 36415 COLL VENOUS BLD VENIPUNCTURE: CPT

## 2019-01-28 PROCEDURE — 99213 OFFICE O/P EST LOW 20 MIN: CPT | Performed by: PHYSICIAN ASSISTANT

## 2019-01-28 RX ORDER — METHYLPREDNISOLONE 4 MG/1
TABLET ORAL
Qty: 1 KIT | Refills: 0 | Status: SHIPPED | OUTPATIENT
Start: 2019-01-28 | End: 2019-02-04 | Stop reason: ALTCHOICE

## 2019-01-28 NOTE — PATIENT INSTRUCTIONS
Thank you for choosing Saurabh Morse PA-C at [place] [address]  To Do: Brenda Castro  1. Pt to go next door for STAT D-dimer  2. Sent medrol dose pack to pharmacy  3.  Will call with results    • Please signup for MY CHART, which is electronic ac approved your testing, please call Central Scheduling at [phone]  Please allow our office 5 business days to contact you regarding any testing results.     Refill policies:   Allow 3 business days for refills; controlled substances may take longer and

## 2019-01-28 NOTE — PROGRESS NOTES
[facility] Internal Medicine Progress Note    CC:  Patient presents with:  Pain: R sided rib cage pain x 5 days      HPI:   HPI  R sided rib cage pain x 5 days  R side rib pain starting last Wednesday  She was at the chiropractor but it is not ge wheezing. Cardiovascular: Positive for chest pain. Negative for palpitations and leg swelling. Gastrointestinal: Negative for nausea and vomiting. Musculoskeletal: Positive for arthralgias and myalgias.    Neurological: Negative for dizziness, light- understanding.     Problem List:  Patient Active Problem List:     Mitral regurgitation     Antiphospholipid syndrome (HCC)     Iron deficiency anemia, unspecified     Unspecified sinusitis (chronic)     Backache, unspecified     Esophageal reflux     Prote

## 2019-02-04 ENCOUNTER — OFFICE VISIT (OUTPATIENT)
Dept: FAMILY MEDICINE CLINIC | Facility: CLINIC | Age: 49
End: 2019-02-04
Payer: COMMERCIAL

## 2019-02-04 ENCOUNTER — HOSPITAL ENCOUNTER (OUTPATIENT)
Dept: GENERAL RADIOLOGY | Age: 49
Discharge: HOME | End: 2019-02-04
Attending: PHYSICIAN ASSISTANT
Payer: COMMERCIAL

## 2019-02-04 VITALS
HEART RATE: 82 BPM | RESPIRATION RATE: 16 BRPM | DIASTOLIC BLOOD PRESSURE: 72 MMHG | SYSTOLIC BLOOD PRESSURE: 118 MMHG | HEIGHT: 64.5 IN | BODY MASS INDEX: 23 KG/M2

## 2019-02-04 DIAGNOSIS — R07.82 INTERCOSTAL PAIN: ICD-10-CM

## 2019-02-04 DIAGNOSIS — R07.82 INTERCOSTAL PAIN: Primary | ICD-10-CM

## 2019-02-04 PROCEDURE — 99213 OFFICE O/P EST LOW 20 MIN: CPT | Performed by: PHYSICIAN ASSISTANT

## 2019-02-04 PROCEDURE — 71046 X-RAY EXAM CHEST 2 VIEWS: CPT | Performed by: PHYSICIAN ASSISTANT

## 2019-02-04 RX ORDER — NAPROXEN 500 MG/1
500 TABLET ORAL 2 TIMES DAILY WITH MEALS
Qty: 60 TABLET | Refills: 0 | Status: SHIPPED | OUTPATIENT
Start: 2019-02-04 | End: 2020-10-26 | Stop reason: ALTCHOICE

## 2019-02-04 NOTE — PATIENT INSTRUCTIONS
Thank you for choosing Dave Johnson PA-C at [place] [address]  To Do: Brenda Castro  1. Pt to get chest xray  2. Contact rheumatologist  3.  Follow-up pending results  • Please signup for MY CHART, which is electronic access to your record if yo please call Central Scheduling at [phone]  Please allow our office 5 business days to contact you regarding any testing results.     Refill policies:   Allow 3 business days for refills; controlled substances may take longer and must be picked up from

## 2019-02-04 NOTE — PROGRESS NOTES
[facility] Internal Medicine Progress Note    CC:  Patient presents with:   Follow - Up: pt states rib pain went away after the medrol dose luciana but now pain is back x 2 days      HPI:   HPI  Pt states the pain went away after medrol pack  She stat palpitations and leg swelling. Gastrointestinal: Negative for nausea and vomiting. Musculoskeletal: Positive for arthralgias and myalgias. Neurological: Negative for dizziness, light-headedness and headaches.          /72   Pulse 82   Resp 16 Mitral regurgitation     Antiphospholipid syndrome (HCC)     Iron deficiency anemia, unspecified     Unspecified sinusitis (chronic)     Backache, unspecified     Esophageal reflux     Proteinuria     Other psoriasis     Unilateral small kidney     Splenom

## 2019-05-21 PROBLEM — L90.0 LICHEN SCLEROSUS: Status: ACTIVE | Noted: 2019-05-21

## 2019-07-19 ENCOUNTER — TELEPHONE (OUTPATIENT)
Dept: HEMATOLOGY/ONCOLOGY | Facility: HOSPITAL | Age: 49
End: 2019-07-19

## 2019-07-19 ENCOUNTER — OFFICE VISIT (OUTPATIENT)
Dept: HEMATOLOGY/ONCOLOGY | Facility: HOSPITAL | Age: 49
End: 2019-07-19
Attending: INTERNAL MEDICINE
Payer: COMMERCIAL

## 2019-07-19 VITALS
OXYGEN SATURATION: 99 % | HEART RATE: 77 BPM | SYSTOLIC BLOOD PRESSURE: 108 MMHG | BODY MASS INDEX: 23.21 KG/M2 | RESPIRATION RATE: 16 BRPM | TEMPERATURE: 98 F | DIASTOLIC BLOOD PRESSURE: 70 MMHG | WEIGHT: 137.63 LBS | HEIGHT: 64.49 IN

## 2019-07-19 DIAGNOSIS — D68.61 ANTIPHOSPHOLIPID SYNDROME (HCC): Primary | ICD-10-CM

## 2019-07-19 DIAGNOSIS — Z86.711 HISTORY OF PULMONARY EMBOLISM: ICD-10-CM

## 2019-07-19 DIAGNOSIS — R07.81 PLEURITIC PAIN: ICD-10-CM

## 2019-07-19 PROCEDURE — 99203 OFFICE O/P NEW LOW 30 MIN: CPT | Performed by: INTERNAL MEDICINE

## 2019-07-19 NOTE — CONSULTS
Cancer Center Report of Consultation    Patient Name: Marisol Mccormick   YOB: 1970   Medical Record Number: [id number]   CSN: [id number]   Consulting Physician: Cresencio Jean MD  Referring Physician(s):   Date of Consultation: 7/19/2019 lung disease) ([facility] 8/8/2013 2013 Dr Stacey Lazo Rheum   • Internal hemorrhoid    • Kidney stone     left side, atrophy to kidney   • Leiomyoma of uterus, unspecified 6/29/2012   • Low iron 5/30/2013   • Lupus ([facility] 2/6/2013   • Menorrhagia 5/30/20 Diabetes Mother         type II   • Hypertension Mother    • Other (Other) Mother         Met to brain   • Cancer Other         No family hx Ovarian/Colon Ca   • Other (alzheimer's) Paternal Grandmother    • Heart Disease Paternal Grandfather    • Heart Jair Sow vomiting, diarrhea, abdominal pain. Derm: No rash, skin lesions, and pruritus. Hematologic/lymphatic: No easy bruising, bleeding, and lymphadenopathy. Musculoskeletal: No myalgias, arthralgias, muscle weakness.   Neurological: No headaches, dizziness, se if she had any concerns as well. Cole Macedo M.D.     THE [facility] [address], [place], [address]    7/19/2019    10:22 AM

## 2019-07-19 NOTE — PROGRESS NOTES
MD f/u for h/o PE. Last seen 2015. Pt is not taking any prophylactic medication. She has been experiencing right chest pain for the last week.  She has lupus and is unsure if it is due to inflammation in her lung or if she has developed another PE. D-dimer

## 2019-08-14 ENCOUNTER — HOSPITAL ENCOUNTER (OUTPATIENT)
Dept: GENERAL RADIOLOGY | Age: 49
Discharge: HOME | End: 2019-08-14
Attending: INTERNAL MEDICINE
Payer: COMMERCIAL

## 2019-08-14 DIAGNOSIS — J98.6 DISORDER OF DIAPHRAGM: ICD-10-CM

## 2019-08-14 PROCEDURE — 76000 FLUOROSCOPY <1 HR PHYS/QHP: CPT | Performed by: INTERNAL MEDICINE

## 2019-11-04 ENCOUNTER — OFFICE VISIT (OUTPATIENT)
Dept: FAMILY MEDICINE CLINIC | Facility: CLINIC | Age: 49
End: 2019-11-04
Payer: COMMERCIAL

## 2019-11-04 VITALS
SYSTOLIC BLOOD PRESSURE: 118 MMHG | DIASTOLIC BLOOD PRESSURE: 74 MMHG | TEMPERATURE: 98 F | BODY MASS INDEX: 24.24 KG/M2 | HEART RATE: 83 BPM | WEIGHT: 142 LBS | HEIGHT: 64 IN | OXYGEN SATURATION: 98 % | RESPIRATION RATE: 16 BRPM

## 2019-11-04 DIAGNOSIS — R10.11 RUQ PAIN: ICD-10-CM

## 2019-11-04 DIAGNOSIS — M32.8 OTHER FORMS OF SYSTEMIC LUPUS ERYTHEMATOSUS, UNSPECIFIED ORGAN INVOLVEMENT STATUS (HCC): ICD-10-CM

## 2019-11-04 DIAGNOSIS — R10.13 EPIGASTRIC ABDOMINAL PAIN: Primary | ICD-10-CM

## 2019-11-04 PROBLEM — I82.409 DVT (DEEP VENOUS THROMBOSIS) (HCC): Status: ACTIVE | Noted: 2019-11-04

## 2019-11-04 PROCEDURE — 99214 OFFICE O/P EST MOD 30 MIN: CPT | Performed by: PHYSICIAN ASSISTANT

## 2019-11-04 NOTE — PROGRESS NOTES
[facility] Internal Medicine Progress Note    CC:  Patient presents with:  Pain: under left breast when she takes a deep breath or moves a certain way. Imm/Inj: declined flu shot      HPI:   HPI  Pt is here today complaining of epigastric pain. for cough, chest tightness, shortness of breath and wheezing. Cardiovascular: Negative for chest pain, palpitations and leg swelling. Gastrointestinal: Positive for abdominal pain.  Negative for abdominal distention, constipation, diarrhea, nausea and imaging and labs    Orders Placed This Encounter      LIPASE [606] [Q]      AMYLASE [243] [Q]      CBC [8899] [Q]      COMP METABOLIC PANEL [89928] [Q]      C-REACTIVE PROTEIN [4420] [Q]      H PYLORI BREATH TEST [94073][Q]      SED RATE, WESTERGREN [809]

## 2019-11-04 NOTE — PATIENT INSTRUCTIONS
Thank you for choosing Dulce Maria Encarnacion PA-C at [place] [address]  To Do: Brenda Castro  1. Pt to get lab work and imaging done  2.  Follow-up in 1 week    • Please signup for MY CHART, which is electronic access to your record if you have not done Scheduling at [phone]  Please allow our office 5 business days to contact you regarding any testing results.     Refill policies:   Allow 3 business days for refills; controlled substances may take longer and must be picked up from the office in person

## 2019-11-05 ENCOUNTER — PATIENT MESSAGE (OUTPATIENT)
Dept: FAMILY MEDICINE CLINIC | Facility: CLINIC | Age: 49
End: 2019-11-05

## 2019-11-06 NOTE — TELEPHONE ENCOUNTER
----- Message from [address] [place] [address] Road Akosua Castro sent at 11/5/2019  8:24 PM CST -----  Regarding: Test Results Question  Contact: [phone]  Only two of my labs were elevated. ALT: 34; Range is 6-29  Absolute Neutrophils: 8442; Range 8140-6406.     My abdominal

## 2019-11-07 ENCOUNTER — TELEPHONE (OUTPATIENT)
Dept: FAMILY MEDICINE CLINIC | Facility: CLINIC | Age: 49
End: 2019-11-07

## 2019-11-07 ENCOUNTER — HOSPITAL ENCOUNTER (OUTPATIENT)
Dept: GENERAL RADIOLOGY | Age: 49
Discharge: HOME | End: 2019-11-07
Attending: PHYSICIAN ASSISTANT
Payer: COMMERCIAL

## 2019-11-07 ENCOUNTER — HOSPITAL ENCOUNTER (OUTPATIENT)
Dept: ULTRASOUND IMAGING | Age: 49
Discharge: HOME | End: 2019-11-07
Attending: PHYSICIAN ASSISTANT
Payer: COMMERCIAL

## 2019-11-07 DIAGNOSIS — R79.89 ELEVATED LFTS: ICD-10-CM

## 2019-11-07 DIAGNOSIS — R10.13 EPIGASTRIC ABDOMINAL PAIN: ICD-10-CM

## 2019-11-07 DIAGNOSIS — R06.02 SOB (SHORTNESS OF BREATH): Primary | ICD-10-CM

## 2019-11-07 DIAGNOSIS — R07.9 CHEST PAIN, UNSPECIFIED TYPE: ICD-10-CM

## 2019-11-07 DIAGNOSIS — R10.11 RUQ PAIN: ICD-10-CM

## 2019-11-07 DIAGNOSIS — R79.89 ABNORMAL CBC: Primary | ICD-10-CM

## 2019-11-07 PROCEDURE — 71046 X-RAY EXAM CHEST 2 VIEWS: CPT | Performed by: PHYSICIAN ASSISTANT

## 2019-11-07 PROCEDURE — 76700 US EXAM ABDOM COMPLETE: CPT | Performed by: PHYSICIAN ASSISTANT

## 2019-11-07 NOTE — PROGRESS NOTES
Negative H. Pylori  Inflammatory markers are normal  ALT which is a liver enzyme is very slightly elevated, would recommend rechecking CMP in 2 weeks  Neutrophil are a type of WBC a little elevated, however WBC is normal, can repeat CBC in 2 weeks as well

## 2019-11-07 NOTE — TELEPHONE ENCOUNTER
LMOM for pt that D-dimer lab has been ordered per [facility]. Requested a return call with questions. Task completed.

## 2019-11-07 NOTE — TELEPHONE ENCOUNTER
See Result Note. See MyChart 11/7/19    Spoke with pt, informed of abdominal US and lab results and recommendations. Pt verbalized understanding and agreement. \    Chest x-ray ordered for persistent epigastric pain.      Pt scheduled OV for 11/11/19 to dis

## 2019-11-07 NOTE — TELEPHONE ENCOUNTER
Baldomero Cotton- Pt sent email on Tuesday and is calling to follow up on questions. Pt would like to know if you would like  Her to start Medrol dose pack as discussed. Pt would like to try this before coming in next week.   If you call between 11-2pm ca Home

## 2019-11-07 NOTE — TELEPHONE ENCOUNTER
Received call back from pt. Pt is also wondering if d-dimer could be ordered. Pt has hx of PE.  States she doesn't think that is what is causing her pain but wants to be on the safe side, especially since her inflammatory labs came back normal. Pt denies SO

## 2019-11-08 ENCOUNTER — PATIENT MESSAGE (OUTPATIENT)
Dept: FAMILY MEDICINE CLINIC | Facility: CLINIC | Age: 49
End: 2019-11-08

## 2019-11-08 RX ORDER — METHYLPREDNISOLONE 4 MG/1
TABLET ORAL
Qty: 1 KIT | Refills: 0 | Status: CANCELLED | OUTPATIENT
Start: 2019-11-08

## 2019-11-08 NOTE — TELEPHONE ENCOUNTER
From: Georgi Adames  To: Rashawn Bernal PA-C  Sent: 11/8/2019 10:38 AM CST  Subject: Test Results Question    Can we try a medrol dosepak to see if symptoms improve over the weekend?    Im only pushing the dosepak because if my symptoms dont improve,

## 2019-11-11 ENCOUNTER — OFFICE VISIT (OUTPATIENT)
Dept: FAMILY MEDICINE CLINIC | Facility: CLINIC | Age: 49
End: 2019-11-11

## 2019-11-11 VITALS
RESPIRATION RATE: 16 BRPM | DIASTOLIC BLOOD PRESSURE: 64 MMHG | OXYGEN SATURATION: 99 % | HEART RATE: 73 BPM | HEIGHT: 64 IN | BODY MASS INDEX: 24.24 KG/M2 | TEMPERATURE: 97 F | WEIGHT: 142 LBS | SYSTOLIC BLOOD PRESSURE: 102 MMHG

## 2019-11-11 DIAGNOSIS — M32.8 OTHER FORMS OF SYSTEMIC LUPUS ERYTHEMATOSUS, UNSPECIFIED ORGAN INVOLVEMENT STATUS (HCC): ICD-10-CM

## 2019-11-11 DIAGNOSIS — R07.82 INTERCOSTAL PAIN: ICD-10-CM

## 2019-11-11 DIAGNOSIS — R10.13 EPIGASTRIC ABDOMINAL PAIN: Primary | ICD-10-CM

## 2019-11-11 PROCEDURE — 99214 OFFICE O/P EST MOD 30 MIN: CPT | Performed by: PHYSICIAN ASSISTANT

## 2019-11-11 RX ORDER — METHYLPREDNISOLONE 4 MG/1
TABLET ORAL
Qty: 1 KIT | Refills: 0 | Status: SHIPPED | OUTPATIENT
Start: 2019-11-11 | End: 2020-03-23

## 2019-11-11 RX ORDER — FAMOTIDINE 20 MG/1
20 TABLET ORAL 2 TIMES DAILY
Qty: 60 TABLET | Refills: 0 | Status: SHIPPED | OUTPATIENT
Start: 2019-11-11 | End: 2019-12-03

## 2019-11-11 NOTE — PATIENT INSTRUCTIONS
Thank you for choosing Brenda Ramirez PA-C at [place] [address]  To Do: Brenda Castro  1. Begin medications as prescribed  2. Referral for rheum  3.  Follow-up if symptoms persist or increase    • Please signup for MY CHART, which is electronic ac approved your testing, please call Central Scheduling at [phone]  Please allow our office 5 business days to contact you regarding any testing results.     Refill policies:   Allow 3 business days for refills; controlled substances may take longer and

## 2019-11-11 NOTE — PROGRESS NOTES
[facility] Internal Medicine Progress Note    CC:  Patient presents with:  Test Results: US and X-ray      HPI:   HPI  Is a 52-year-old female here to follow up for her epigastric/rib pain. She had lab work and imaging done.   Her lab work all ca Constitutional: Negative for chills, fatigue and fever. Respiratory: Negative for cough and shortness of breath. Cardiovascular: Positive for chest pain. Gastrointestinal: Positive for abdominal pain and constipation.  Negative for abdominal disten pain  Possibly inflammatory, will try medrol dose pack  Possibly secondary to constipation pt to start OTC metamucil and probiotic  Discussed lab results, imaging results with pt  Will also begin famotidine  Discussed side effects and adverse effects of me right foot     Lichen sclerosus     DVT (deep venous thrombosis) (HCC)     Steroid dependent

## 2019-12-03 RX ORDER — FAMOTIDINE 20 MG/1
TABLET ORAL
Qty: 60 TABLET | Refills: 0 | Status: SHIPPED | OUTPATIENT
Start: 2019-12-03 | End: 2019-12-30

## 2019-12-03 NOTE — TELEPHONE ENCOUNTER
Requested Prescriptions     Pending Prescriptions Disp Refills   • FAMOTIDINE 20 MG Oral Tab [Pharmacy Med Name: FAMOTIDINE 20 MG TABLET] 60 tablet 0     Sig: TAKE 1 TABLET BY MOUTH TWICE A DAY       LOV: 11/11/2019  RTC: PRN    Filled: 11/11/2019 #60 with

## 2019-12-18 ENCOUNTER — TELEPHONE (OUTPATIENT)
Dept: FAMILY MEDICINE CLINIC | Facility: CLINIC | Age: 49
End: 2019-12-18

## 2019-12-18 RX ORDER — AZITHROMYCIN 250 MG/1
TABLET, FILM COATED ORAL
Qty: 6 TABLET | Refills: 0 | Status: SHIPPED | OUTPATIENT
Start: 2019-12-18 | End: 2020-03-23

## 2019-12-18 RX ORDER — SULFAMETHOXAZOLE AND TRIMETHOPRIM 800; 160 MG/1; MG/1
1 TABLET ORAL 2 TIMES DAILY
Qty: 10 TABLET | Refills: 0 | Status: SHIPPED | OUTPATIENT
Start: 2019-12-18 | End: 2020-03-23

## 2019-12-18 NOTE — TELEPHONE ENCOUNTER
Patient states the [facility] recommended [facility]ie End for treatment for whooping cough.     Bactrim DS sent  Message routed to pcp if ok to change

## 2019-12-18 NOTE — TELEPHONE ENCOUNTER
Pt's child has been diagnosed with whooping cough. Please advise pt on next steps for the rest of the family. She was told by the pediatrician to get the vaccine for everyone. Pt would like to further discuss with a nurse.

## 2019-12-18 NOTE — TELEPHONE ENCOUNTER
Left VM advising patient of RX being sent  Medication Quantity Refills Start End   Sulfamethoxazole-TMP -160 MG Oral Tab per tablet 10 tablet 0 12/18/2019    Sig:   Take 1 tablet by mouth 2 (two) times daily.

## 2019-12-18 NOTE — TELEPHONE ENCOUNTER
PREFERRED TREATMENT from [address] [place] [address]: Cintia Palma [address] [facility] [address], [phone], [phone]

## 2019-12-18 NOTE — TELEPHONE ENCOUNTER
No signs and symptoms  Leaving on a plane in a week  Needs to be on abx for 5 days prior to getting on a plan  -Requesting prophylactic abx:  Bactrim for whooping cough    Please advise

## 2019-12-30 RX ORDER — FAMOTIDINE 20 MG/1
20 TABLET ORAL 2 TIMES DAILY
Qty: 180 TABLET | Refills: 1 | Status: SHIPPED | OUTPATIENT
Start: 2019-12-30

## 2020-03-12 ENCOUNTER — HOSPITAL ENCOUNTER (OUTPATIENT)
Dept: MAMMOGRAPHY | Age: 50
Discharge: HOME | End: 2020-03-12
Attending: NURSE PRACTITIONER
Payer: COMMERCIAL

## 2020-03-12 DIAGNOSIS — Z12.31 BREAST CANCER SCREENING BY MAMMOGRAM: ICD-10-CM

## 2020-03-12 PROCEDURE — 77063 BREAST TOMOSYNTHESIS BI: CPT | Performed by: NURSE PRACTITIONER

## 2020-03-12 PROCEDURE — 77067 SCR MAMMO BI INCL CAD: CPT | Performed by: NURSE PRACTITIONER

## 2020-03-19 ENCOUNTER — HOSPITAL ENCOUNTER (OUTPATIENT)
Dept: MAMMOGRAPHY | Age: 50
Discharge: HOME | End: 2020-03-19
Attending: NURSE PRACTITIONER
Payer: COMMERCIAL

## 2020-03-19 ENCOUNTER — HOSPITAL ENCOUNTER (OUTPATIENT)
Dept: ULTRASOUND IMAGING | Age: 50
Discharge: HOME | End: 2020-03-19
Attending: NURSE PRACTITIONER
Payer: COMMERCIAL

## 2020-03-19 DIAGNOSIS — R92.2 INCONCLUSIVE MAMMOGRAM: ICD-10-CM

## 2020-03-19 PROCEDURE — 76642 ULTRASOUND BREAST LIMITED: CPT | Performed by: NURSE PRACTITIONER

## 2020-03-19 PROCEDURE — 77061 BREAST TOMOSYNTHESIS UNI: CPT | Performed by: NURSE PRACTITIONER

## 2020-03-19 PROCEDURE — 77065 DX MAMMO INCL CAD UNI: CPT | Performed by: NURSE PRACTITIONER

## 2020-03-20 ENCOUNTER — TELEPHONE (OUTPATIENT)
Dept: RHEUMATOLOGY | Facility: CLINIC | Age: 50
End: 2020-03-20

## 2020-03-23 ENCOUNTER — OFFICE VISIT (OUTPATIENT)
Dept: RHEUMATOLOGY | Facility: CLINIC | Age: 50
End: 2020-03-23
Payer: COMMERCIAL

## 2020-03-23 VITALS
HEART RATE: 64 BPM | DIASTOLIC BLOOD PRESSURE: 70 MMHG | BODY MASS INDEX: 23 KG/M2 | WEIGHT: 136 LBS | SYSTOLIC BLOOD PRESSURE: 102 MMHG | TEMPERATURE: 98 F | RESPIRATION RATE: 16 BRPM

## 2020-03-23 DIAGNOSIS — Z79.899 HIGH RISK MEDICATION USE: ICD-10-CM

## 2020-03-23 DIAGNOSIS — M94.0 COSTOCHONDRITIS: ICD-10-CM

## 2020-03-23 DIAGNOSIS — G89.29 CHRONIC RIGHT SHOULDER PAIN: ICD-10-CM

## 2020-03-23 DIAGNOSIS — Z86.79 HISTORY OF PERICARDITIS: ICD-10-CM

## 2020-03-23 DIAGNOSIS — R06.02 SHORTNESS OF BREATH: ICD-10-CM

## 2020-03-23 DIAGNOSIS — M32.9 SYSTEMIC LUPUS ERYTHEMATOSUS, UNSPECIFIED SLE TYPE, UNSPECIFIED ORGAN INVOLVEMENT STATUS (HCC): Primary | ICD-10-CM

## 2020-03-23 DIAGNOSIS — M25.511 CHRONIC RIGHT SHOULDER PAIN: ICD-10-CM

## 2020-03-23 DIAGNOSIS — M79.671 RIGHT FOOT PAIN: ICD-10-CM

## 2020-03-23 PROCEDURE — 99245 OFF/OP CONSLTJ NEW/EST HI 55: CPT | Performed by: INTERNAL MEDICINE

## 2020-03-23 RX ORDER — HYDROXYCHLOROQUINE SULFATE 200 MG/1
TABLET, FILM COATED ORAL
Qty: 180 TABLET | Refills: 0 | Status: SHIPPED | OUTPATIENT
Start: 2020-03-23 | End: 2020-03-25

## 2020-03-23 NOTE — PROGRESS NOTES
?  RHEUMATOLOGY NEW PATIENT   Date of visit: 3/23/2020  ? Patient presents with:  Establish Care: NP ref by Grace Rivas PA-C  for lupus. Pt has seen another rheum in the past. Pt states 'was diagnosed seven years with lupus by Dr. Boston Yao.  Would lik Since she has had issues with LFTs in the past, would like to avoid increasing this unless plaquenil not helping taper off prednisone.   Will also get updated high res CT of chest as well as updated 2D ECHO to evaluate status of heart and lungs since these HI RESOLUTION (CPT=05113); Future  -     CARD ECHO 2D DOPPLER (CPT=49513); Future    Chronic right shoulder pain    High risk medication use  -     URINALYSIS, ROUTINE; Future  -     CBC WITH DIFFERENTIAL WITH PLATELET;  Future  -     COMP METABOLIC PANEL ( In 2013, she developed some indigestion which would not go away with OTC remedies. Had pain/fullness when bending forward.  Then developed shortness of breath significant with ambulation and even conversational. Was in the hospital, had pericarditis and pain or swelling of the ankles, or new skin nodule formation. The patient denies oral or nasal ulcers, photosensitive rash, elevated or scarring rashes, Raynaud's phenomenon, or history of seizures.   Denies nonhealing ulcers on the fingertips or trouble s arthritis(714.0) 2013    20 years on enbrel- resolved remission till 4598-3392- now with lupus like features     Past Surgical History:  Past Surgical History:   Procedure Laterality Date   •  25-28WKS FETAL ZEINA  age 13   •   2008 Tobacco comment: quit smoking 2000    Alcohol use: No    Drug use: No    Medications:  Hydroxychloroquine Sulfate 200 MG Oral Tab, Take 400mg daily on MWFS and 200mg on TuesThursSun., Disp: 180 tablet, Rfl: 0  MYCOPHENOLATE MOFETIL 500 MG Oral Tab, TAKE not bruise/bleed easily. Psychiatric/Behavioral: Negative for depression. The patient is nervous/anxious. The patient does not have insomnia.       PHYSICAL EXAM   Today's Vitals:  Temperature Blood Pressure Heart Rate Resp Rate SpO2   Temp: 98.4 °F (36.9 no effusion. Lymphadenopathy:     She has no cervical adenopathy. Neurological: She is alert and oriented to person, place, and time. No cranial nerve deficit. Skin: Skin is warm and dry. No rash noted. She is not diaphoretic. No erythema.    No malar PATIENT STATED HISTORY: (As transcribed by Technologist)  Epigastric pain for 5 days. FINDINGS:    Liver: The liver is visualized and is normal in shape and contour. Biliary Tree:  The common bile duct diameter measures 4 mm.  No intrahepat than right. Stable proximal left femoral lesion demonstrates long-term stability. SOFT TISSUES:  Negative. No visible soft tissue swelling. EFFUSION:  None visible.   OTHER:  Moderate amount of fecal material noted within the visualized colon.     ===== right kidney. AORTA/VASCULAR:  Retroaortic left renal vein. RETROPERITONEUM:  Unremarkable. BOWEL/MESENTERY:  Unremarkable appendix. Scattered feces in the colon. No large or small bowel dilatation. No free air or significant free fluid.   ABDOMINAL WALL 11/04/2019    NEUT 80.4 11/04/2019    LYMPH 10.4 11/04/2019    MON 8.3 11/04/2019    EOS 0.3 11/04/2019    BASO 0.6 11/04/2019    NEPERCENT 85.3 09/09/2017    LYPERCENT 7.2 09/09/2017    MOPERCENT 6.5 09/09/2017    EOPERCENT 0.1 09/09/2017    BAPERCENT 0.4 07/2017 76.6 low     C4  03/2013 15 normal  07/2013 22 normal  09/2013 22 normal  02/2017 19 normal   07/2017 20.3 normal     CRP  03/2013 82.9 very elevated   07/2013 normal   09/2013 normal  07/2017 20.3 normal   11/2019 normal     ESR  03/2013 normal

## 2020-03-25 ENCOUNTER — TELEPHONE (OUTPATIENT)
Dept: RHEUMATOLOGY | Facility: CLINIC | Age: 50
End: 2020-03-25

## 2020-03-25 DIAGNOSIS — M32.9 SYSTEMIC LUPUS ERYTHEMATOSUS, UNSPECIFIED SLE TYPE, UNSPECIFIED ORGAN INVOLVEMENT STATUS (HCC): ICD-10-CM

## 2020-03-25 RX ORDER — HYDROXYCHLOROQUINE SULFATE 200 MG/1
TABLET, FILM COATED ORAL
Qty: 180 TABLET | Refills: 0 | Status: SHIPPED | OUTPATIENT
Start: 2020-03-25 | End: 2020-09-23

## 2020-04-29 ENCOUNTER — OFFICE VISIT (OUTPATIENT)
Dept: SURGERY | Facility: CLINIC | Age: 50
End: 2020-04-29
Payer: COMMERCIAL

## 2020-04-29 VITALS — TEMPERATURE: 98 F | BODY MASS INDEX: 23.22 KG/M2 | WEIGHT: 136 LBS | HEIGHT: 64 IN

## 2020-04-29 DIAGNOSIS — Z80.3 FAMILY HISTORY OF BREAST CANCER IN SISTER: ICD-10-CM

## 2020-04-29 DIAGNOSIS — R92.8 ABNORMAL MAMMOGRAM OF RIGHT BREAST: Primary | ICD-10-CM

## 2020-04-29 PROCEDURE — 99243 OFF/OP CNSLTJ NEW/EST LOW 30: CPT | Performed by: SURGERY

## 2020-04-29 NOTE — H&P
New Patient Visit Note       Active Problems      1. Abnormal mammogram of right breast    2.  Family history of breast cancer in sister        Chief Complaint   Patient presents with:  Breast Problem: New patient referred by Raine Rush for abnormal leeanne the age of 44. Allergies  Brenda is allergic to doxycycline. Past Medical / Surgical / Social / Family History    The past medical and past surgical history have been reviewed by me today.     Past Medical History:   Diagnosis Date   • SUDHIR positive Performed by Neelam Yost MD at [facility] MAIN OR       The family history and social history have been reviewed by me today.     Family History   Problem Relation Age of Onset   • Breast Cancer Sister 55   • Glaucoma Father    • High Cholesterol Father by mouth 2 (two) times daily. (Patient not taking: Reported on 3/23/2020 ), Disp: 180 tablet, Rfl: 1  •  MYCOPHENOLATE MOFETIL 500 MG Oral Tab, TAKE 1 TABLET BY MOUTH DAILY. , Disp: 90 tablet, Rfl: 0  •  Clobetasol Propionate 0.05 % External Ointment, Finge and well-nourished. No distress. HENT:   Head: Normocephalic and atraumatic. Eyes: Pupils are equal, round, and reactive to light. Conjunctivae and EOM are normal. No scleral icterus.    Neck: Trachea normal and full passive range of motion without pain radiologist's interpretation. FINDINGS:  There is focal asymmetry medial right breast. Ultrasound was performed. There is a solid mass at 4:00 seen posterior to the nipple with the patient in the supine position measuring 7 mm x 6 mm x 9 mm.  Close inspe been sent to the patient. This exam was evaluated with a computer-aided device.   This patient's information has been entered into a reminder system with a target due date for the next mammogram.    Dictated by: Jeanette Rouse MD on 3/21/2018        [facility] right breast ultrasound in September. The order was placed at today's visit. I will call her with the results.   · If the mass grows or changes morphology, I would then recommend proceeding with either a repeat core needle biopsy or an excisional biopsy o

## 2020-05-08 PROCEDURE — 87491 CHLMYD TRACH DNA AMP PROBE: CPT | Performed by: NURSE PRACTITIONER

## 2020-05-08 PROCEDURE — 87591 N.GONORRHOEAE DNA AMP PROB: CPT | Performed by: NURSE PRACTITIONER

## 2020-05-12 DIAGNOSIS — J98.9 REACTIVE AIRWAY DISEASE THAT IS NOT ASTHMA: ICD-10-CM

## 2020-05-12 RX ORDER — PREDNISONE 1 MG/1
4 TABLET ORAL DAILY
Qty: 360 TABLET | Refills: 3 | Status: SHIPPED | OUTPATIENT
Start: 2020-05-12 | End: 2021-08-06

## 2020-05-14 ENCOUNTER — LAB ENCOUNTER (OUTPATIENT)
Dept: LAB | Age: 50
End: 2020-05-14
Attending: NURSE PRACTITIONER
Payer: COMMERCIAL

## 2020-05-14 DIAGNOSIS — Z01.419 WOMEN'S ANNUAL ROUTINE GYNECOLOGICAL EXAMINATION: ICD-10-CM

## 2020-05-14 DIAGNOSIS — Z20.2 POSSIBLE EXPOSURE TO STD: ICD-10-CM

## 2020-05-14 DIAGNOSIS — Z11.3 SCREEN FOR STD (SEXUALLY TRANSMITTED DISEASE): ICD-10-CM

## 2020-05-14 PROCEDURE — 82306 VITAMIN D 25 HYDROXY: CPT

## 2020-05-14 PROCEDURE — 80061 LIPID PANEL: CPT

## 2020-05-14 PROCEDURE — 36415 COLL VENOUS BLD VENIPUNCTURE: CPT

## 2020-05-14 PROCEDURE — 85025 COMPLETE CBC W/AUTO DIFF WBC: CPT

## 2020-05-14 PROCEDURE — 84443 ASSAY THYROID STIM HORMONE: CPT

## 2020-05-14 PROCEDURE — 80053 COMPREHEN METABOLIC PANEL: CPT

## 2020-05-14 PROCEDURE — 87389 HIV-1 AG W/HIV-1&-2 AB AG IA: CPT

## 2020-05-14 PROCEDURE — 86592 SYPHILIS TEST NON-TREP QUAL: CPT

## 2020-05-14 PROCEDURE — 80074 ACUTE HEPATITIS PANEL: CPT

## 2020-05-20 ENCOUNTER — HOSPITAL ENCOUNTER (OUTPATIENT)
Dept: CV DIAGNOSTICS | Age: 50
Discharge: HOME | End: 2020-05-20
Attending: INTERNAL MEDICINE
Payer: COMMERCIAL

## 2020-05-20 DIAGNOSIS — Z86.79 HISTORY OF PERICARDITIS: ICD-10-CM

## 2020-05-20 DIAGNOSIS — M32.9 SYSTEMIC LUPUS ERYTHEMATOSUS, UNSPECIFIED SLE TYPE, UNSPECIFIED ORGAN INVOLVEMENT STATUS (HCC): ICD-10-CM

## 2020-05-20 DIAGNOSIS — R06.02 SHORTNESS OF BREATH: ICD-10-CM

## 2020-05-20 PROCEDURE — 93306 TTE W/DOPPLER COMPLETE: CPT | Performed by: INTERNAL MEDICINE

## 2020-07-02 PROCEDURE — 87591 N.GONORRHOEAE DNA AMP PROB: CPT | Performed by: NURSE PRACTITIONER

## 2020-07-02 PROCEDURE — 87491 CHLMYD TRACH DNA AMP PROBE: CPT | Performed by: NURSE PRACTITIONER

## 2020-07-03 ENCOUNTER — PATIENT MESSAGE (OUTPATIENT)
Dept: RHEUMATOLOGY | Facility: CLINIC | Age: 50
End: 2020-07-03

## 2020-07-03 DIAGNOSIS — M32.9 SYSTEMIC LUPUS ERYTHEMATOSUS, UNSPECIFIED SLE TYPE, UNSPECIFIED ORGAN INVOLVEMENT STATUS (HCC): Primary | ICD-10-CM

## 2020-07-03 RX ORDER — METHYLPREDNISOLONE 4 MG/1
TABLET ORAL
Qty: 1 KIT | Refills: 0 | Status: SHIPPED | OUTPATIENT
Start: 2020-07-03 | End: 2020-10-26

## 2020-07-06 ENCOUNTER — OFFICE VISIT (OUTPATIENT)
Dept: RHEUMATOLOGY | Facility: CLINIC | Age: 50
End: 2020-07-06
Payer: COMMERCIAL

## 2020-07-06 VITALS
RESPIRATION RATE: 16 BRPM | TEMPERATURE: 98 F | WEIGHT: 138.63 LBS | HEIGHT: 64.5 IN | DIASTOLIC BLOOD PRESSURE: 70 MMHG | BODY MASS INDEX: 23.38 KG/M2 | HEART RATE: 60 BPM | SYSTOLIC BLOOD PRESSURE: 120 MMHG

## 2020-07-06 DIAGNOSIS — R06.02 SHORTNESS OF BREATH: ICD-10-CM

## 2020-07-06 DIAGNOSIS — M32.9 SYSTEMIC LUPUS ERYTHEMATOSUS, UNSPECIFIED SLE TYPE, UNSPECIFIED ORGAN INVOLVEMENT STATUS (HCC): Primary | ICD-10-CM

## 2020-07-06 DIAGNOSIS — M94.0 COSTOCHONDRITIS: ICD-10-CM

## 2020-07-06 DIAGNOSIS — Z86.79 HISTORY OF PERICARDITIS: ICD-10-CM

## 2020-07-06 DIAGNOSIS — Z79.899 HIGH RISK MEDICATION USE: ICD-10-CM

## 2020-07-06 PROCEDURE — 99214 OFFICE O/P EST MOD 30 MIN: CPT | Performed by: INTERNAL MEDICINE

## 2020-07-06 NOTE — PROGRESS NOTES
?  RHEUMATOLOGY Follow up   Date of visit: 7/6/2020  ? Patient presents with: Follow - Up: 3 month f/u. Feeling fine. Medrol dose pack has helped a lot.      ?  ASSESSMENT, DISCUSSION & PLAN   Assessment:  Systemic lupus erythematosus, unspecified SLE t based off these results. She will follow up in 3 months or sooner as needed.      Patient verbalized understanding of above instructions. No further questions at this time.   Spent over 25 min face to face with pt, >50% of that time spent discussing treat tightness in her chest as well as indigestion (increased belching) as well as a tightness in her upper abdomen. Prescribed medrol dose pack and now has complete resolution of symptoms. Denies any new areas of joint pain or swelling.   Denies any signifi have lupus anticoagulant positivity as well as Factor V Leiden. Does still get ddimer checked occasionally.      In 2013, she developed some indigestion which would not go away with OTC remedies. Had pain/fullness when bending forward.  Then developed ivon elbows         No history of significant morning stiffness, wrist pain or swelling, pain or swelling of the MCPs, pain or swelling of the ankles, or new skin nodule formation.   The patient denies oral or nasal ulcers, photosensitive rash, elevated or scarr hypercoagulable state ([facility] [address]     V leiden mutation   • Psoriasis    • Reactive airway disease 12/14/2016   • Rheumatoid arthritis(714.0) 6/28/2013    20 years on enbrel- resolved remission till 6208-1154- now with lupus like features     Past Surgical Histo Types: Cigarettes        Quit date: 2000        Years since quittin.0      Smokeless tobacco: Never Used      Tobacco comment: quit smoking     Alcohol use: Yes      Frequency: 4 or more times a week      Comment: 1 beer nightly    Drug use: Blood Pressure Heart Rate Resp Rate SpO2   Temp: 97.5 °F (36.4 °C) BP: 120/70 Pulse: 60 Resp: 16     ?   Current Weight Height BMI BSA Pain   Wt Readings from Last 1 Encounters:  07/06/20 : 138 lb 9.6 oz (62.9 kg)   Height: 64.5\" Body mass index is 23.42 k major cracks visualized stable  Slight dry/scaly plaque over left elbow   Psychiatric: She has a normal mood and affect. Her behavior is normal.   Nursing note and vitals reviewed.     ?  Radiology review:    nothing pertinent to review     Labs:  Lab Resul negative  Alpha-1 antitrypsin elevated  dsDNA 257 (N<100)  histone 133 (N<100)  centromere, uzma 1, SSA, SSB , RNP, smith negative   cardiolipin negative      03/2013  dsDNA negative  RNP negative  Gaming negative  SCL 70-  SSA negative  SSB negative  Chromat

## 2020-07-27 ENCOUNTER — LAB ENCOUNTER (OUTPATIENT)
Dept: LAB | Age: 50
End: 2020-07-27
Attending: INTERNAL MEDICINE
Payer: COMMERCIAL

## 2020-07-27 DIAGNOSIS — Z79.899 HIGH RISK MEDICATION USE: ICD-10-CM

## 2020-07-27 DIAGNOSIS — M32.9 SYSTEMIC LUPUS ERYTHEMATOSUS, UNSPECIFIED SLE TYPE, UNSPECIFIED ORGAN INVOLVEMENT STATUS (HCC): ICD-10-CM

## 2020-07-27 DIAGNOSIS — M32.9 SYSTEMIC LUPUS ERYTHEMATOSUS (HCC): Primary | ICD-10-CM

## 2020-07-27 PROCEDURE — 36415 COLL VENOUS BLD VENIPUNCTURE: CPT | Performed by: INTERNAL MEDICINE

## 2020-08-06 ENCOUNTER — TELEPHONE (OUTPATIENT)
Dept: RHEUMATOLOGY | Facility: CLINIC | Age: 50
End: 2020-08-06

## 2020-08-06 NOTE — TELEPHONE ENCOUNTER
LVM for pt to call back to discuss results.      Levi Monsivais, DO  [facility]  8/6/2020    AVISE from 07/2020  SUDHIR by IgG+, negative by hep2  dsDNA positive, negative on confirmatory  Gaming negative  CB-CAPs both positive  Remainder of MARYCARMEN panel negat

## 2020-08-10 LAB
ABSOLUTE BASOPHILS: 74 CELLS/UL (ref 0–200)
ABSOLUTE EOSINOPHILS: 74 CELLS/UL (ref 15–500)
ABSOLUTE LYMPHOCYTES: 1598 CELLS/UL (ref 850–3900)
ABSOLUTE MONOCYTES: 762 CELLS/UL (ref 200–950)
ABSOLUTE NEUTROPHILS: 4891 CELLS/UL (ref 1500–7800)
ALBUMIN/GLOBULIN RATIO: 1.7 (CALC) (ref 1–2.5)
ALBUMIN: 4.1 G/DL (ref 3.6–5.1)
ALKALINE PHOSPHATASE: 95 U/L (ref 31–125)
ALT: 41 U/L (ref 6–29)
AST: 27 U/L (ref 10–35)
BASOPHILS: 1 %
BILIRUBIN, TOTAL: 0.4 MG/DL (ref 0.2–1.2)
BUN: 20 MG/DL (ref 7–25)
C-REACTIVE PROTEIN: 1.6 MG/L
CALCIUM: 9.1 MG/DL (ref 8.6–10.2)
CARBON DIOXIDE: 29 MMOL/L (ref 20–32)
CHLORIDE: 103 MMOL/L (ref 98–110)
COMPLEMENT COMPONENT C3C: 90 MG/DL (ref 83–193)
COMPLEMENT COMPONENT C4C: 25 MG/DL (ref 15–57)
CREATININE: 0.99 MG/DL (ref 0.5–1.1)
EGFR IF AFRICN AM: 78 ML/MIN/1.73M2
EGFR IF NONAFRICN AM: 67 ML/MIN/1.73M2
EOSINOPHILS: 1 %
GLOBULIN: 2.4 G/DL (CALC) (ref 1.9–3.7)
GLUCOSE: 85 MG/DL (ref 65–99)
HEMATOCRIT: 37.8 % (ref 35–45)
HEMOGLOBIN: 13.2 G/DL (ref 11.7–15.5)
IMMUNOGLOBULIN A: 174 MG/DL (ref 47–310)
IMMUNOGLOBULIN G: 948 MG/DL (ref 600–1640)
IMMUNOGLOBULIN M: 118 MG/DL (ref 50–300)
LYMPHOCYTES: 21.6 %
MCH: 30.9 PG (ref 27–33)
MCHC: 34.9 G/DL (ref 32–36)
MCV: 88.5 FL (ref 80–100)
MITOCHONDRIA M2 ANTIBODY (IGG), EIA: <20 U
MONOCYTES: 10.3 %
MPV: 10.4 FL (ref 7.5–12.5)
NEUTROPHILS: 66.1 %
PLATELET COUNT: 179 THOUSAND/UL (ref 140–400)
POTASSIUM: 3.9 MMOL/L (ref 3.5–5.3)
PROTEIN, TOTAL: 6.5 G/DL (ref 6.1–8.1)
RDW: 12.6 % (ref 11–15)
RED BLOOD CELL COUNT: 4.27 MILLION/UL (ref 3.8–5.1)
SED RATE BY MODIFIED$WESTERGREN: 9 MM/H
SMOOTH MUSCLE AB SCREEN: NEGATIVE
SODIUM: 139 MMOL/L (ref 135–146)
WHITE BLOOD CELL COUNT: 7.4 THOUSAND/UL (ref 3.8–10.8)

## 2020-08-12 ENCOUNTER — TELEPHONE (OUTPATIENT)
Dept: RHEUMATOLOGY | Facility: CLINIC | Age: 50
End: 2020-08-12

## 2020-09-21 ENCOUNTER — HOSPITAL ENCOUNTER (OUTPATIENT)
Dept: ULTRASOUND IMAGING | Age: 50
Discharge: HOME | End: 2020-09-21
Attending: SURGERY
Payer: COMMERCIAL

## 2020-09-21 DIAGNOSIS — Z80.3 FAMILY HISTORY OF BREAST CANCER IN SISTER: ICD-10-CM

## 2020-09-21 DIAGNOSIS — R92.8 ABNORMAL MAMMOGRAM OF RIGHT BREAST: ICD-10-CM

## 2020-09-21 PROCEDURE — 76642 ULTRASOUND BREAST LIMITED: CPT | Performed by: SURGERY

## 2020-09-23 ENCOUNTER — TELEPHONE (OUTPATIENT)
Dept: RHEUMATOLOGY | Facility: CLINIC | Age: 50
End: 2020-09-23

## 2020-09-23 DIAGNOSIS — M32.9 SYSTEMIC LUPUS ERYTHEMATOSUS, UNSPECIFIED SLE TYPE, UNSPECIFIED ORGAN INVOLVEMENT STATUS (HCC): ICD-10-CM

## 2020-09-23 RX ORDER — HYDROXYCHLOROQUINE SULFATE 200 MG/1
TABLET, FILM COATED ORAL
Qty: 180 TABLET | Refills: 0 | Status: SHIPPED | OUTPATIENT
Start: 2020-09-23 | End: 2020-12-16

## 2020-09-23 NOTE — TELEPHONE ENCOUNTER
Future Appointments   Date Time Provider Kelly Stanford   10/26/2020  9:45 AM Cece Champagne, DO [facility] [address]

## 2020-09-28 DIAGNOSIS — N63.10 BREAST MASS, RIGHT: Primary | ICD-10-CM

## 2020-09-29 ENCOUNTER — PATIENT OUTREACH (OUTPATIENT)
Dept: SURGERY | Facility: CLINIC | Age: 50
End: 2020-09-29

## 2020-10-26 ENCOUNTER — OFFICE VISIT (OUTPATIENT)
Dept: RHEUMATOLOGY | Facility: CLINIC | Age: 50
End: 2020-10-26
Payer: COMMERCIAL

## 2020-10-26 VITALS
HEART RATE: 70 BPM | RESPIRATION RATE: 16 BRPM | TEMPERATURE: 97 F | HEIGHT: 64.5 IN | WEIGHT: 136.63 LBS | BODY MASS INDEX: 23.04 KG/M2 | DIASTOLIC BLOOD PRESSURE: 72 MMHG | SYSTOLIC BLOOD PRESSURE: 118 MMHG

## 2020-10-26 DIAGNOSIS — Z79.52 CURRENT CHRONIC USE OF SYSTEMIC STEROIDS: ICD-10-CM

## 2020-10-26 DIAGNOSIS — Z79.899 LONG-TERM USE OF PLAQUENIL: ICD-10-CM

## 2020-10-26 DIAGNOSIS — Z13.820 ENCOUNTER FOR IMAGING TO ASSESS OSTEOPOROSIS: ICD-10-CM

## 2020-10-26 DIAGNOSIS — M32.9 SYSTEMIC LUPUS ERYTHEMATOSUS, UNSPECIFIED SLE TYPE, UNSPECIFIED ORGAN INVOLVEMENT STATUS (HCC): Primary | ICD-10-CM

## 2020-10-26 DIAGNOSIS — Z79.899 HIGH RISK MEDICATION USE: ICD-10-CM

## 2020-10-26 DIAGNOSIS — Z90.710 HISTORY OF HYSTERECTOMY: ICD-10-CM

## 2020-10-26 DIAGNOSIS — Z86.79 HISTORY OF PERICARDITIS: ICD-10-CM

## 2020-10-26 PROCEDURE — 3078F DIAST BP <80 MM HG: CPT | Performed by: INTERNAL MEDICINE

## 2020-10-26 PROCEDURE — 3008F BODY MASS INDEX DOCD: CPT | Performed by: INTERNAL MEDICINE

## 2020-10-26 PROCEDURE — 99214 OFFICE O/P EST MOD 30 MIN: CPT | Performed by: INTERNAL MEDICINE

## 2020-10-26 PROCEDURE — 3074F SYST BP LT 130 MM HG: CPT | Performed by: INTERNAL MEDICINE

## 2020-10-26 NOTE — PROGRESS NOTES
?  RHEUMATOLOGY Follow up   Date of visit: 10/26/2020  ? Patient presents with:  SLE: 3 month f/u. Feeling fine. One of her fingers, knee, and hip in pain. Knee was hit by softball, hip feels more arthritic.      ?  ASSESSMENT, DISCUSSION & PLAN   Assess Regarding left knee pain, seems post-traumatic. Recommended she keep me updated if does not improve in the next 1-2 weeks, then further work up may be warranted. She will get updated labs and I will call with those results.    Follow up in 3-4 months o of Plaquenil  -     DSDNA (CRITHIDIA LUCILIAE) ANTIBODY IGG BY IFA; Future  -     Cancel: COMPLEMENT C3, SERUM; Future  -     Cancel: COMPLEMENT C4, SERUM; Future  -     Cancel: SED RATETHEO (AUTOMATED);  Future  -     Cancel: C-REACTIVE PROTEIN; Fu She was seen by rheumatology at that time, had RF negative and dx with JRA/CAREN. Was on naprosyn and sulfasalazine and gold treatment,  was on that regimen for a few years. Eventually, was started on Enbrel injections around 2000.   was on Enbrel Decision was made to add CellCept. Was on one tablet twice daily, was having difficulty remembering to take the second dose. Since she was doing well overall, she has maintained on CellCept once daily as well as plaquenil 200mg daily.  Has not been able to easy bruising or bleeding, or unexplained weakness. Denies chronic sinus infections/disease or epistaxis. Denies chronic cough or hemoptysis.    Denies obvious blood in urine (aside from when had kidney stones)     Family hx:  Twin sister with Raynaud's, OTHER  June 2011    Endoscopy   • OTHER SURGICAL HISTORY      ESSURE   • ROBOT-ASSISTED LAPAROSCOPIC HYSTERECTOMY Bilateral 6/27/2013    Performed by Irineo Batista MD at [facility] MAIN OR     Family History:  Family History   Problem Relation Age of Onset every 4 (four) hours as needed for Wheezing., Disp: 1 Inhaler, Rfl: 6      Modified Medications    No medications on file     Medications Discontinued During This Encounter  naproxen 500 MG Oral Tab                Reason: Therapy completed  methylPREDNISol scleral icterus. Neck: Normal range of motion. Neck supple. No JVD present. No tracheal deviation present. Cardiovascular: Normal rate, regular rhythm, normal heart sounds and intact distal pulses. No murmur heard.   Pulmonary/Chest: Effort normal and 08/04/2020    NEUT 66.1 08/04/2020    LYMPH 21.6 08/04/2020    MON 10.3 08/04/2020    EOS 1.0 08/04/2020    BASO 1.0 08/04/2020    NEPERCENT 60.7 05/14/2020    LYPERCENT 25.3 05/14/2020    MOPERCENT 11.1 05/14/2020    EOPERCENT 1.0 05/14/2020    BAPERCENT negative  Gaming negative  SCL 70-  SSA negative  SSB negative  Chromatin negative  Michell 1 negative   Centromere negative     09/2013  dsDNA negative  RNP negative  Gaming negative  SCL 70-  SSA negative  SSB negative  Chromatin negative  Michell 1 negative   Centr

## 2020-11-06 ENCOUNTER — PATIENT MESSAGE (OUTPATIENT)
Dept: RHEUMATOLOGY | Facility: CLINIC | Age: 50
End: 2020-11-06

## 2020-11-06 DIAGNOSIS — M32.9 SYSTEMIC LUPUS ERYTHEMATOSUS, UNSPECIFIED SLE TYPE, UNSPECIFIED ORGAN INVOLVEMENT STATUS (HCC): Primary | ICD-10-CM

## 2020-11-09 ENCOUNTER — TELEPHONE (OUTPATIENT)
Dept: RHEUMATOLOGY | Facility: CLINIC | Age: 50
End: 2020-11-09

## 2020-11-09 ENCOUNTER — LAB ENCOUNTER (OUTPATIENT)
Dept: LAB | Age: 50
End: 2020-11-09
Attending: INTERNAL MEDICINE
Payer: COMMERCIAL

## 2020-11-09 DIAGNOSIS — M32.9 SYSTEMIC LUPUS ERYTHEMATOSUS, UNSPECIFIED SLE TYPE, UNSPECIFIED ORGAN INVOLVEMENT STATUS (HCC): ICD-10-CM

## 2020-11-09 DIAGNOSIS — M32.9 SYSTEMIC LUPUS ERYTHEMATOSUS, UNSPECIFIED SLE TYPE, UNSPECIFIED ORGAN INVOLVEMENT STATUS (HCC): Primary | ICD-10-CM

## 2020-11-09 PROCEDURE — 36415 COLL VENOUS BLD VENIPUNCTURE: CPT

## 2020-11-09 PROCEDURE — 85379 FIBRIN DEGRADATION QUANT: CPT

## 2020-11-09 NOTE — TELEPHONE ENCOUNTER
Phoned pt per Dr. Becca Gonzalez. D-dimer is negative. Pt verbalized understanding. Will do fu imaging in a few weeks if not improved.

## 2020-11-09 NOTE — TELEPHONE ENCOUNTER
Phoned pt in regards to her Head Held High[employer] message Pt with hx of blood clots, hit with ball to her knee a few weeks ago, now co pain and swelling. Pt worried she may have a blood clot. Dr. Charmayne Harvey aware.  Explained to pt if she believes she may have a blood clot, a

## 2020-12-16 DIAGNOSIS — M32.9 SYSTEMIC LUPUS ERYTHEMATOSUS, UNSPECIFIED SLE TYPE, UNSPECIFIED ORGAN INVOLVEMENT STATUS (HCC): ICD-10-CM

## 2020-12-16 RX ORDER — HYDROXYCHLOROQUINE SULFATE 200 MG/1
TABLET, FILM COATED ORAL
Qty: 180 TABLET | Refills: 0 | Status: SHIPPED | OUTPATIENT
Start: 2020-12-16 | End: 2021-01-25

## 2021-01-22 DIAGNOSIS — M32.9 SYSTEMIC LUPUS ERYTHEMATOSUS, UNSPECIFIED SLE TYPE, UNSPECIFIED ORGAN INVOLVEMENT STATUS (HCC): ICD-10-CM

## 2021-01-25 ENCOUNTER — PATIENT MESSAGE (OUTPATIENT)
Dept: RHEUMATOLOGY | Facility: CLINIC | Age: 51
End: 2021-01-25

## 2021-01-25 DIAGNOSIS — M32.9 SYSTEMIC LUPUS ERYTHEMATOSUS, UNSPECIFIED SLE TYPE, UNSPECIFIED ORGAN INVOLVEMENT STATUS (HCC): ICD-10-CM

## 2021-01-25 RX ORDER — HYDROXYCHLOROQUINE SULFATE 200 MG/1
TABLET, FILM COATED ORAL
Qty: 141 TABLET | Refills: 1 | OUTPATIENT
Start: 2021-01-25

## 2021-01-25 RX ORDER — HYDROXYCHLOROQUINE SULFATE 200 MG/1
TABLET, FILM COATED ORAL
Qty: 180 TABLET | Refills: 0 | Status: SHIPPED | OUTPATIENT
Start: 2021-01-25 | End: 2021-03-15

## 2021-01-25 RX ORDER — HYDROXYCHLOROQUINE SULFATE 200 MG/1
TABLET, FILM COATED ORAL
Qty: 180 TABLET | Refills: 0 | OUTPATIENT
Start: 2021-01-25

## 2021-02-04 LAB
C-REACTIVE PROTEIN: 2.4 MG/L
COMPLEMENT COMPONENT C3C: 82 MG/DL (ref 83–193)
COMPLEMENT COMPONENT C4C: 22 MG/DL (ref 15–57)
DNA AB (DS) CRITHIDIA,IFA: NEGATIVE
SARS COV 2 AB IGG: NEGATIVE
SED RATE BY MODIFIED$WESTERGREN: 9 MM/H

## 2021-03-15 ENCOUNTER — OFFICE VISIT (OUTPATIENT)
Dept: RHEUMATOLOGY | Facility: CLINIC | Age: 51
End: 2021-03-15
Payer: COMMERCIAL

## 2021-03-15 VITALS
BODY MASS INDEX: 24.79 KG/M2 | HEIGHT: 64.5 IN | SYSTOLIC BLOOD PRESSURE: 102 MMHG | WEIGHT: 147 LBS | HEART RATE: 78 BPM | RESPIRATION RATE: 16 BRPM | TEMPERATURE: 97 F | DIASTOLIC BLOOD PRESSURE: 70 MMHG

## 2021-03-15 DIAGNOSIS — Z79.52 CURRENT CHRONIC USE OF SYSTEMIC STEROIDS: ICD-10-CM

## 2021-03-15 DIAGNOSIS — Z79.899 HIGH RISK MEDICATION USE: ICD-10-CM

## 2021-03-15 DIAGNOSIS — Z79.899 LONG-TERM USE OF PLAQUENIL: ICD-10-CM

## 2021-03-15 DIAGNOSIS — M94.0 COSTOCHONDRITIS: ICD-10-CM

## 2021-03-15 DIAGNOSIS — M32.9 SYSTEMIC LUPUS ERYTHEMATOSUS, UNSPECIFIED SLE TYPE, UNSPECIFIED ORGAN INVOLVEMENT STATUS (HCC): Primary | ICD-10-CM

## 2021-03-15 DIAGNOSIS — Z86.79 HISTORY OF PERICARDITIS: ICD-10-CM

## 2021-03-15 PROCEDURE — 3078F DIAST BP <80 MM HG: CPT | Performed by: INTERNAL MEDICINE

## 2021-03-15 PROCEDURE — 3008F BODY MASS INDEX DOCD: CPT | Performed by: INTERNAL MEDICINE

## 2021-03-15 PROCEDURE — 99214 OFFICE O/P EST MOD 30 MIN: CPT | Performed by: INTERNAL MEDICINE

## 2021-03-15 PROCEDURE — 3074F SYST BP LT 130 MM HG: CPT | Performed by: INTERNAL MEDICINE

## 2021-03-15 RX ORDER — NAPROXEN 500 MG/1
500 TABLET ORAL 2 TIMES DAILY WITH MEALS
Qty: 60 TABLET | Refills: 1 | Status: SHIPPED | OUTPATIENT
Start: 2021-03-15 | End: 2021-05-28

## 2021-03-15 RX ORDER — HYDROXYCHLOROQUINE SULFATE 200 MG/1
TABLET, FILM COATED ORAL
Qty: 180 TABLET | Refills: 1 | Status: SHIPPED | OUTPATIENT
Start: 2021-03-15 | End: 2021-12-03

## 2021-03-17 ENCOUNTER — OFFICE VISIT (OUTPATIENT)
Dept: SURGERY | Facility: CLINIC | Age: 51
End: 2021-03-17
Payer: COMMERCIAL

## 2021-03-17 VITALS
TEMPERATURE: 97 F | WEIGHT: 145 LBS | SYSTOLIC BLOOD PRESSURE: 115 MMHG | HEIGHT: 64.5 IN | HEART RATE: 75 BPM | BODY MASS INDEX: 24.45 KG/M2 | DIASTOLIC BLOOD PRESSURE: 72 MMHG

## 2021-03-17 DIAGNOSIS — R23.4 CHANGE OF SKIN OF BREAST: ICD-10-CM

## 2021-03-17 DIAGNOSIS — Z80.3 FAMILY HISTORY OF BREAST CANCER IN SISTER: ICD-10-CM

## 2021-03-17 DIAGNOSIS — R92.8 ABNORMAL MAMMOGRAM OF RIGHT BREAST: Primary | ICD-10-CM

## 2021-03-17 PROCEDURE — 3008F BODY MASS INDEX DOCD: CPT | Performed by: SURGERY

## 2021-03-17 PROCEDURE — 99213 OFFICE O/P EST LOW 20 MIN: CPT | Performed by: SURGERY

## 2021-03-17 PROCEDURE — 3078F DIAST BP <80 MM HG: CPT | Performed by: SURGERY

## 2021-03-17 PROCEDURE — 3074F SYST BP LT 130 MM HG: CPT | Performed by: SURGERY

## 2021-03-17 NOTE — PROGRESS NOTES
Follow Up Visit Note       Active Problems      1. Abnormal mammogram of right breast    2. Family history of breast cancer in sister    1.  Change of skin of breast          Chief Complaint   Patient presents with:  Breast Problem: EP breast follow up- PT joint, multiple sites 6/29/2012   • PE (pulmonary embolism) 11/2007   • Pericardial effusion 1/2013   • Pericarditis 2/6/2013   • PONV (postoperative nausea and vomiting)     with c section 2008   • Primary hypercoagulable state ([facility] [address]     lupus anticoagula Disease Maternal Grandfather    • Other (Other) Sister         Sherri   • No Known Problems Brother    • Cancer Maternal Aunt         Pancreatic Ca   • Cancer Maternal Uncle         Breast Ca     Social History    Socioeconomic History      Marital statu MG Oral Tab, TAKE 2 TABLETS BY MOUTH DAILY ON M/W/F/S AND 1 TABLET ON TUES/THURS/SUN., Disp: 180 tablet, Rfl: 1  •  naproxen 500 MG Oral Tab, Take 1 tablet (500 mg total) by mouth 2 (two) times daily with meals. , Disp: 60 tablet, Rfl: 1  •  predniSONE 1 MG Constitutional:       General: She is not in acute distress. Appearance: She is well-developed. She is not diaphoretic. HENT:      Head: Normocephalic and atraumatic. Eyes:      General: No scleral icterus.      Conjunctiva/sclera: Conjunctivae no

## 2021-05-28 DIAGNOSIS — M32.9 SYSTEMIC LUPUS ERYTHEMATOSUS, UNSPECIFIED SLE TYPE, UNSPECIFIED ORGAN INVOLVEMENT STATUS (HCC): ICD-10-CM

## 2021-05-28 DIAGNOSIS — M94.0 COSTOCHONDRITIS: ICD-10-CM

## 2021-05-28 RX ORDER — NAPROXEN 500 MG/1
TABLET ORAL
Qty: 60 TABLET | Refills: 1 | Status: SHIPPED | OUTPATIENT
Start: 2021-05-28

## 2021-05-28 NOTE — TELEPHONE ENCOUNTER
Future Appointments   Date Time Provider Kelly Stanford   10/18/2021  8:30 AM Cece Champagne, DO [facility] [address]

## 2021-08-06 DIAGNOSIS — J98.9 REACTIVE AIRWAY DISEASE WITHOUT ASTHMA: ICD-10-CM

## 2021-08-06 RX ORDER — PREDNISONE 1 MG/1
TABLET ORAL
Qty: 360 TABLET | Refills: 3 | Status: SHIPPED | OUTPATIENT
Start: 2021-08-06 | End: 2021-09-14

## 2021-09-13 DIAGNOSIS — J98.9 REACTIVE AIRWAY DISEASE WITHOUT ASTHMA: ICD-10-CM

## 2021-09-13 RX ORDER — PREDNISONE 1 MG/1
4 TABLET ORAL DAILY
Qty: 360 TABLET | Refills: 3 | OUTPATIENT
Start: 2021-09-13

## 2021-09-13 NOTE — TELEPHONE ENCOUNTER
Future Appointments   Date Time Provider Kelly Stanford   9/16/2021  9:20 AM PFS DEXA  1 PFS DEXA [facility]   10/18/2021  8:30 AM Cece Champagne,  [facility] [address]

## 2021-09-14 ENCOUNTER — PATIENT MESSAGE (OUTPATIENT)
Dept: RHEUMATOLOGY | Facility: CLINIC | Age: 51
End: 2021-09-14

## 2021-09-14 DIAGNOSIS — J98.9 REACTIVE AIRWAY DISEASE WITHOUT ASTHMA: ICD-10-CM

## 2021-09-14 RX ORDER — PREDNISONE 1 MG/1
4 TABLET ORAL DAILY
Qty: 360 TABLET | Refills: 3 | Status: SHIPPED | OUTPATIENT
Start: 2021-09-14 | End: 2021-12-13

## 2021-09-14 NOTE — TELEPHONE ENCOUNTER
From: Geovanny Small, DO  Sent: 9/14/2021 2:52 PM CDT  Subject: Prednisone refill    I requested a refill for Prednisone and it shows denied. Was it a duplicate request from the pharmacy? If not, why was it denied?

## 2021-09-14 NOTE — TELEPHONE ENCOUNTER
Called [facility] to clarify denial of Prednisone yesterday; they have no record of this.     LOV 3-15-21  Future Appointments   Date Time Provider Kelly Stanford   9/16/2021  9:20 AM PFS KRISTY  1 PFS DEXA [place]   10/18/2021  8:30 AM Cece Champagne DO E

## 2021-09-16 ENCOUNTER — HOSPITAL ENCOUNTER (OUTPATIENT)
Dept: BONE DENSITY | Age: 51
Discharge: HOME | End: 2021-09-16
Attending: INTERNAL MEDICINE
Payer: COMMERCIAL

## 2021-09-16 DIAGNOSIS — Z79.52 CURRENT CHRONIC USE OF SYSTEMIC STEROIDS: ICD-10-CM

## 2021-09-16 DIAGNOSIS — Z90.710 HISTORY OF HYSTERECTOMY: ICD-10-CM

## 2021-09-16 DIAGNOSIS — Z13.820 ENCOUNTER FOR IMAGING TO ASSESS OSTEOPOROSIS: ICD-10-CM

## 2021-09-16 PROCEDURE — 77080 DXA BONE DENSITY AXIAL: CPT | Performed by: INTERNAL MEDICINE

## 2021-10-07 ENCOUNTER — OFFICE VISIT (OUTPATIENT)
Dept: SURGERY | Facility: CLINIC | Age: 51
End: 2021-10-07
Payer: COMMERCIAL

## 2021-10-07 ENCOUNTER — PATIENT MESSAGE (OUTPATIENT)
Dept: FAMILY MEDICINE CLINIC | Facility: CLINIC | Age: 51
End: 2021-10-07

## 2021-10-07 ENCOUNTER — OFFICE VISIT (OUTPATIENT)
Dept: FAMILY MEDICINE CLINIC | Facility: CLINIC | Age: 51
End: 2021-10-07
Payer: COMMERCIAL

## 2021-10-07 VITALS — HEIGHT: 64.5 IN | BODY MASS INDEX: 23.61 KG/M2 | WEIGHT: 140 LBS | TEMPERATURE: 97 F

## 2021-10-07 VITALS
BODY MASS INDEX: 23.61 KG/M2 | WEIGHT: 140 LBS | SYSTOLIC BLOOD PRESSURE: 120 MMHG | RESPIRATION RATE: 16 BRPM | HEIGHT: 64.5 IN | HEART RATE: 80 BPM | DIASTOLIC BLOOD PRESSURE: 80 MMHG

## 2021-10-07 DIAGNOSIS — M32.8 OTHER FORMS OF SYSTEMIC LUPUS ERYTHEMATOSUS, UNSPECIFIED ORGAN INVOLVEMENT STATUS (HCC): ICD-10-CM

## 2021-10-07 DIAGNOSIS — K64.5 THROMBOSED EXTERNAL HEMORRHOID: Primary | ICD-10-CM

## 2021-10-07 DIAGNOSIS — D68.51 FACTOR V LEIDEN (HCC): ICD-10-CM

## 2021-10-07 DIAGNOSIS — K64.9 ACUTE HEMORRHOID: Primary | ICD-10-CM

## 2021-10-07 PROCEDURE — 3008F BODY MASS INDEX DOCD: CPT | Performed by: FAMILY MEDICINE

## 2021-10-07 PROCEDURE — 3074F SYST BP LT 130 MM HG: CPT | Performed by: FAMILY MEDICINE

## 2021-10-07 PROCEDURE — 3079F DIAST BP 80-89 MM HG: CPT | Performed by: FAMILY MEDICINE

## 2021-10-07 PROCEDURE — 3008F BODY MASS INDEX DOCD: CPT | Performed by: COLON & RECTAL SURGERY

## 2021-10-07 PROCEDURE — 99213 OFFICE O/P EST LOW 20 MIN: CPT | Performed by: FAMILY MEDICINE

## 2021-10-07 PROCEDURE — 99244 OFF/OP CNSLTJ NEW/EST MOD 40: CPT | Performed by: COLON & RECTAL SURGERY

## 2021-10-07 RX ORDER — ASPIRIN 81 MG
100 TABLET, DELAYED RELEASE (ENTERIC COATED) ORAL 2 TIMES DAILY PRN
Qty: 30 TABLET | Refills: 1 | Status: SHIPPED | OUTPATIENT
Start: 2021-10-07

## 2021-10-07 RX ORDER — ALBUTEROL SULFATE 90 UG/1
2 AEROSOL, METERED RESPIRATORY (INHALATION) EVERY 4 HOURS PRN
Qty: 3 EACH | Refills: 1 | Status: SHIPPED | OUTPATIENT
Start: 2021-10-07

## 2021-10-07 RX ORDER — NITROGLYCERIN 4 MG/G
1.5 OINTMENT RECTAL EVERY 12 HOURS
Qty: 1 EACH | Refills: 1 | Status: SHIPPED | OUTPATIENT
Start: 2021-10-07 | End: 2021-10-14

## 2021-10-07 NOTE — PATIENT INSTRUCTIONS
The patient presents today in consultation of Dr. Cheyanne Minaya for hemorrhoid. The patient states that overnight she had a hemorrhoid that \"blew up\" on her and has begun to cause her severe pain.   She states that she had been constipated the last few days intervention. I do recommend that the patient begin taking hot baths, as hot as she can stand it for at least 10 minutes at a time. She may use Epson salt as well with these baths. This will help with her pain and was drinking the hemorrhoid.     I also

## 2021-10-07 NOTE — H&P
New Patient Visit Note       Active Problems      1. Thrombosed external hemorrhoid    2. Other forms of systemic lupus erythematosus, unspecified organ involvement status ([facility] [address]    3.  Factor V Leiden [facility])        Chief Complaint   Patient presents with:  H assessment and plan. The physician performed all medical decision making. Dedrick Bloom PA-C    My PA endorsement  I agree with the note as scribed by the PA  I performed my own physical exam and obtained the history as detailed above.   I have made (pulmonary embolism) 11/2007   • Pericardial effusion 1/2013   • Pericarditis 2/6/2013   • PONV (postoperative nausea and vomiting)     with c section 2008   • Primary hypercoagulable state ([address]     lupus anticoagulant   • Primary hypercoagulable state (H Cancer Maternal Uncle         Breast Ca     Social History    Socioeconomic History      Marital status:     Tobacco Use      Smoking status: Former Smoker        Years: 14.00        Types: Cigarettes        Quit date: 7/1/2000        Years since Priyank (20 mg total) by mouth 2 (two) times daily. , Disp: 180 tablet, Rfl: 1      Review of Systems  The Review of Systems has been reviewed by me during today.   Review of Systems   Constitutional: Negative for chills, diaphoresis, fatigue, fever and unexpected w breath sounds, wheezing, rhonchi or rales. Chest:   Breasts:      Right: No supraclavicular adenopathy. Left: No supraclavicular adenopathy. Abdominal:      General: Bowel sounds are normal. There is no distension or abdominal bruit.       Palp Speech: Speech normal.         Behavior: Behavior normal. Behavior is cooperative. Thought Content:  Thought content normal.         Judgment: Judgment normal.             Assessment   Thrombosed external hemorrhoid  (primary encounter diagnosi There is a clot within the hemorrhoid. There is no evidence of any infection. There are no abscesses, fistula, or other external hemorrhoids.     I discussed the patient that she does have a thrombosed right posterior lateral external hemorrhoid, however

## 2021-10-07 NOTE — TELEPHONE ENCOUNTER
From: Goldy Hidalgo  To: Trinity Christianson MD  Sent: 10/7/2021 11:08 AM CDT  Subject: Prescription issue    One of the prescriptions you prescribed this morning is a compounded medication which is not fulfilled at [facility], Nifedipine.  They suggested Rec

## 2021-10-07 NOTE — PATIENT INSTRUCTIONS
Thank you for choosing Scot Alvarado MD at [place] [address]  To Do: [facility] [place] [address]. Please take meds as directed. [facility] is located in [address]. Monday, Tuesday & Friday – 8 a.m. to 4 p.m.   Wednesday, Thursday – 7 a.m. t outweigh those potential risks and we strive to make you healthier and to improve your quality of life.     Referrals, and Radiology Information:    If your insurance requires a referral to a specialist, please allow 5 business days to process your referral can lead to constipation, hard stools, and straining. Also, don’t read while on the toilet. Sit only as long as needed. Wipe gently with soft, unscented toilet tissue or baby wipes.   · Use ice packs. Placing an ice pack on an external hemorrhoid can help r an exercise program. Low-impact activities, such as swimming or walking, are good places to start. Take it easy at first. And remember to drink plenty of water when you exercise.    High-fiber foods Easy ways to add fiber  High-fiber foods offer many benefi

## 2021-10-07 NOTE — PROGRESS NOTES
Subjective:   Patient ID: Key Bazan is a 49year old female. HPI  Ms. Terra Baumgarten is a pleasant 50-year-old female with known history of systemic lupus erythematosus on hydroxychloroquine here today for painful hemorrhoid which she noted a few tablet 1   • famoTIDine 20 MG Oral Tab Take 1 tablet (20 mg total) by mouth 2 (two) times daily.  180 tablet 1   • Albuterol Sulfate HFA (PROAIR HFA) 108 (90 BASE) MCG/ACT Inhalation Aero Soln Inhale 2 puffs into the lungs every 4 (four) hours as needed for pastibase base 1 Container 1     Sig: Apply 1 Application topically 3 (three) times daily.    Apply pea sized amount three times a day as directed    Nifedipine Ointment 0.2% in pastibase base   • hydrocortisone 2.5 % External Ointment 20 g 0     Sig: Apply

## 2021-10-14 ENCOUNTER — OFFICE VISIT (OUTPATIENT)
Dept: SURGERY | Facility: CLINIC | Age: 51
End: 2021-10-14
Payer: COMMERCIAL

## 2021-10-14 VITALS — HEART RATE: 73 BPM | TEMPERATURE: 97 F | SYSTOLIC BLOOD PRESSURE: 110 MMHG | DIASTOLIC BLOOD PRESSURE: 75 MMHG

## 2021-10-14 DIAGNOSIS — K64.5 THROMBOSED EXTERNAL HEMORRHOID: Primary | ICD-10-CM

## 2021-10-14 PROCEDURE — 99213 OFFICE O/P EST LOW 20 MIN: CPT | Performed by: COLON & RECTAL SURGERY

## 2021-10-14 PROCEDURE — 3078F DIAST BP <80 MM HG: CPT | Performed by: COLON & RECTAL SURGERY

## 2021-10-14 PROCEDURE — 3074F SYST BP LT 130 MM HG: CPT | Performed by: COLON & RECTAL SURGERY

## 2021-10-14 NOTE — PROGRESS NOTES
Follow Up Visit Note       Active Problems      1. Thrombosed external hemorrhoid          Chief Complaint   Patient presents with:  Hemorrhoids: EP-thrombosed hemorrhoid -- Denies pain or discomfort at this time.  Denies active rectal bleeding, but notes m that were available at today's visit. Allergies  Brenda is allergic to doxycycline. Past Medical / Surgical / Social / Family History    The past medical and past surgical history have been reviewed by me today.     Past Medical History:   Diagnosis have been reviewed by me today.     Family History   Problem Relation Age of Onset   • Breast Cancer Sister 55   • Glaucoma Father    • High Cholesterol Father            • Diabetes Father         Type II   • Cancer Father         prostate   • Hyper the lungs every 4 (four) hours as needed for Wheezing., Disp: 3 each, Rfl: 1  •  predniSONE 1 MG Oral Tab, Take 4 tablets (4 mg total) by mouth daily.  (Patient taking differently: Take 3 mg by mouth daily.), Disp: 360 tablet, Rfl: 3  •  NAPROXEN 500 MG Ora Conjunctivae normal.   Neck:      Thyroid: No thyroid mass or thyromegaly. Vascular: Normal carotid pulses. No carotid bruit or JVD. Trachea: Trachea normal. No tracheal deviation.    Cardiovascular:      Rate and Rhythm: Normal rate and regular r Left upper body: No supraclavicular adenopathy. Skin:     General: Skin is warm and dry. Neurological:      Mental Status: She is alert and oriented to person, place, and time.    Psychiatric:         Speech: Speech normal.         Behavior: Behavior no

## 2021-10-14 NOTE — PATIENT INSTRUCTIONS
The patient presents today for continued care evaluation regarding her thrombosed external hemorrhoid. The patient was seen in our office last week and had an acute thrombosed hemorrhoid.   The patient dates that since then it definitely feels that it is

## 2021-10-18 ENCOUNTER — TELEPHONE (OUTPATIENT)
Dept: RHEUMATOLOGY | Facility: CLINIC | Age: 51
End: 2021-10-18

## 2021-10-26 DIAGNOSIS — N63.10 BREAST MASS, RIGHT: Primary | ICD-10-CM

## 2021-11-03 ENCOUNTER — HOSPITAL ENCOUNTER (OUTPATIENT)
Age: 51
Discharge: HOME | End: 2021-11-03
Payer: COMMERCIAL

## 2021-11-03 VITALS
HEIGHT: 64.5 IN | OXYGEN SATURATION: 97 % | BODY MASS INDEX: 23.61 KG/M2 | RESPIRATION RATE: 18 BRPM | TEMPERATURE: 98 F | DIASTOLIC BLOOD PRESSURE: 81 MMHG | HEART RATE: 99 BPM | SYSTOLIC BLOOD PRESSURE: 142 MMHG | WEIGHT: 140 LBS

## 2021-11-03 DIAGNOSIS — J02.9 SORE THROAT: ICD-10-CM

## 2021-11-03 DIAGNOSIS — Z20.822 LAB TEST NEGATIVE FOR COVID-19 VIRUS: ICD-10-CM

## 2021-11-03 DIAGNOSIS — J02.9 ACUTE VIRAL PHARYNGITIS: Primary | ICD-10-CM

## 2021-11-03 PROCEDURE — U0002 COVID-19 LAB TEST NON-CDC: HCPCS | Performed by: PHYSICIAN ASSISTANT

## 2021-11-03 PROCEDURE — 99213 OFFICE O/P EST LOW 20 MIN: CPT | Performed by: PHYSICIAN ASSISTANT

## 2021-11-03 PROCEDURE — 87880 STREP A ASSAY W/OPTIC: CPT | Performed by: PHYSICIAN ASSISTANT

## 2021-11-03 NOTE — ED INITIAL ASSESSMENT (HPI)
Pt c/o sore throat that started on Monday. Pt was at a bonfire on Sunday. Pt with c/o slight congestion and cough.   Pt needs covid testing

## 2021-11-03 NOTE — ED PROVIDER NOTES
Patient Seen in: [facility]mediate [facility]      History   Patient presents with:  Sore Throat    Stated Complaint: sore throat    Subjective:   HPI    61-year-old female who comes in today complaining of sore throat that started over the past 2 to 3 days.   I • HYSTERECTOMY  06/27/2013    [place] with BS   • NEEDLE BIOPSY RIGHT Right 6/2016    US guided biopsy - benign **no clip was deployed**   • OTHER      hsyteroscopy w insert of device to occlude fallopian tubes   • OTHER  June 2011    Endoscopy   • OTHER VO auscultation bilaterally, respirations unlabored. No wheezing, rales or rhonchi. Heart: NSR, S1, S2 present. No murmurs, rubs or gallops.   Skin: no rash         ED Course     Labs Reviewed   POCT RAPID STREP - Normal   RAPID SARS-COV-2 BY PCR - Normal

## 2021-11-15 DIAGNOSIS — M32.9 SYSTEMIC LUPUS ERYTHEMATOSUS, UNSPECIFIED SLE TYPE, UNSPECIFIED ORGAN INVOLVEMENT STATUS (HCC): ICD-10-CM

## 2021-11-15 RX ORDER — HYDROXYCHLOROQUINE SULFATE 200 MG/1
TABLET, FILM COATED ORAL
Qty: 180 TABLET | Refills: 1 | OUTPATIENT
Start: 2021-11-15

## 2021-11-18 ENCOUNTER — PATIENT MESSAGE (OUTPATIENT)
Dept: RHEUMATOLOGY | Facility: CLINIC | Age: 51
End: 2021-11-18

## 2021-11-18 DIAGNOSIS — R79.82 ELEVATED C-REACTIVE PROTEIN (CRP): ICD-10-CM

## 2021-11-18 DIAGNOSIS — Z79.899 HIGH RISK MEDICATION USE: ICD-10-CM

## 2021-11-18 DIAGNOSIS — M32.9 SYSTEMIC LUPUS ERYTHEMATOSUS, UNSPECIFIED SLE TYPE, UNSPECIFIED ORGAN INVOLVEMENT STATUS (HCC): Primary | ICD-10-CM

## 2021-12-03 ENCOUNTER — TELEPHONE (OUTPATIENT)
Dept: RHEUMATOLOGY | Facility: CLINIC | Age: 51
End: 2021-12-03

## 2021-12-03 DIAGNOSIS — M32.9 SYSTEMIC LUPUS ERYTHEMATOSUS, UNSPECIFIED SLE TYPE, UNSPECIFIED ORGAN INVOLVEMENT STATUS (HCC): ICD-10-CM

## 2021-12-03 RX ORDER — HYDROXYCHLOROQUINE SULFATE 200 MG/1
TABLET, FILM COATED ORAL
Qty: 180 TABLET | Refills: 1 | Status: SHIPPED | OUTPATIENT
Start: 2021-12-03

## 2021-12-03 NOTE — TELEPHONE ENCOUNTER
----- Message from Lj Poole RN sent at 12/3/2021  4:20 PM CST -----  Regarding: FW: Eronmaki Latif    ----- Message -----  From: Cyrus Jones  Sent: 12/3/2021   4:11 PM CST  To: [facility] Clinical Staff  Subject: Rocio Latif                                     In [place] in the [address] [facility] [address] [facility]

## 2021-12-03 NOTE — TELEPHONE ENCOUNTER
Future Appointments   Date Time Provider Kelly Stanford   12/10/2021  8:30 AM Alma Goetz DO [facility]   4/11/2022  8:30 AM Cece Champagne DO [facility] [address]     LOV 3/15/21

## 2021-12-10 ENCOUNTER — OFFICE VISIT (OUTPATIENT)
Dept: RHEUMATOLOGY | Facility: CLINIC | Age: 51
End: 2021-12-10
Payer: COMMERCIAL

## 2021-12-10 VITALS
TEMPERATURE: 97 F | DIASTOLIC BLOOD PRESSURE: 60 MMHG | HEIGHT: 64.5 IN | BODY MASS INDEX: 24.45 KG/M2 | RESPIRATION RATE: 16 BRPM | HEART RATE: 80 BPM | SYSTOLIC BLOOD PRESSURE: 114 MMHG | WEIGHT: 145 LBS

## 2021-12-10 DIAGNOSIS — M32.9 SYSTEMIC LUPUS ERYTHEMATOSUS, UNSPECIFIED SLE TYPE, UNSPECIFIED ORGAN INVOLVEMENT STATUS (HCC): Primary | ICD-10-CM

## 2021-12-10 DIAGNOSIS — Z86.79 HISTORY OF PERICARDITIS: ICD-10-CM

## 2021-12-10 DIAGNOSIS — M85.89 OSTEOPENIA OF MULTIPLE SITES: ICD-10-CM

## 2021-12-10 DIAGNOSIS — R79.82 ELEVATED C-REACTIVE PROTEIN (CRP): ICD-10-CM

## 2021-12-10 DIAGNOSIS — Z79.899 LONG-TERM USE OF PLAQUENIL: ICD-10-CM

## 2021-12-10 DIAGNOSIS — R79.89 ABNORMAL LFTS: ICD-10-CM

## 2021-12-10 DIAGNOSIS — Z79.52 CURRENT CHRONIC USE OF SYSTEMIC STEROIDS: ICD-10-CM

## 2021-12-10 DIAGNOSIS — M94.0 COSTOCHONDRITIS: ICD-10-CM

## 2021-12-10 DIAGNOSIS — E55.9 VITAMIN D DEFICIENCY: ICD-10-CM

## 2021-12-10 PROCEDURE — 99214 OFFICE O/P EST MOD 30 MIN: CPT | Performed by: INTERNAL MEDICINE

## 2021-12-10 PROCEDURE — 3078F DIAST BP <80 MM HG: CPT | Performed by: INTERNAL MEDICINE

## 2021-12-10 PROCEDURE — 3074F SYST BP LT 130 MM HG: CPT | Performed by: INTERNAL MEDICINE

## 2021-12-10 PROCEDURE — 3008F BODY MASS INDEX DOCD: CPT | Performed by: INTERNAL MEDICINE

## 2021-12-10 NOTE — PATIENT INSTRUCTIONS
-- get updated liver function testing prior and after massage  -- will add vitamin d with the next set of labs  -- remember to get updated eye exam in February  -- continue plaquenil current dosing without changes  -- continue prednisone 3mg for now, will

## 2021-12-10 NOTE — PROGRESS NOTES
RHEUMATOLOGY Follow up   Date of visit: 12/10/2021  ? Patient presents with:  SLE: 9 month f/u. Feeling fine. Sometimes has sternum pain, but chiro helps with that. No new symptoms.      ?  ASSESSMENT, DISCUSSION & PLAN   Assessment:  Systemic lupus eryt recent lupus testing was grossly negative except for her persistently/chronically low C3 levels. She lacks any overt signs of synovitis. At this time, we will have her continue current dose of Plaquenil as well as prednisone 3 mg daily.  She has been moya Future  -     DSDNA (CRITHIDIA LUCILIAE) ANTIBODY IGG BY IFA; Future  -     SED RATE, WESTERGREN (AUTOMATED); Future  -     C-REACTIVE PROTEIN; Future  -     URINALYSIS, ROUTINE; Future  -     VITAMIN D; Future  -     HEPATIC FUNCTION PANEL (7);  Future that lasts for more than a day. Never swelling or redness. Does have some neck pain but attributes to more muscular   Denies skin rashes  Denies significant dry eyes/dry mouth.      HPI from initial consultation  referred for rheumatologic evaluation due States SUDHIR has been positive for several years. Only had dsDNA positive once. Other serologies were negative. Continues to have C3 low and C4 normal. At that time, also had some issues with gall bladder and elevations in LFTs.    Initially started on plaque patient denies any history of uveitis, crampy abdominal pain, constipation, diarrhea, bloody stools, Achilles heel pain or symptoms of enthesitis, spooning or pitting of the nails, history of dactylitis.   There are no symptoms of severe dry eyes or swellin COLONOSCOPY  2009   • COLONOSCOPY  11/2013    garret   • CYSTOSCOPY,URETEROSCOPY,LITHOTRIPSY Left 09/18/2017    L URS, laser litho, stone extraction, stent RPG, NSC   • HYSTERECTOMY  06/27/2013    [place] with BS   • NEEDLE BIOPSY RIGHT Right 6/2016    US guide times daily as needed for constipation. , Disp: 30 tablet, Rfl: 1  albuterol (PROAIR HFA) 108 (90 Base) MCG/ACT Inhalation Aero Soln, Inhale 2 puffs into the lungs every 4 (four) hours as needed for Wheezing., Disp: 3 each, Rfl: 1  predniSONE 1 MG Oral Tab, kg/m². Body surface area is 1.72 meters squared. Physical Exam  Vitals and nursing note reviewed. Constitutional:       General: She is not in acute distress. Appearance: Normal appearance. She is well-developed. She is not diaphoretic.    HEN Behavior: Behavior normal.         Thought Content:  Thought content normal.       Radiology review:    nothing pertinent to review     Labs:  Lab Results   Component Value Date    WBC 8.8 12/01/2021    RBC 4.08 12/01/2021    HGB 12.3 12/01/2021    HCT IgM normal  Crp normal  C4 25 normal  C3 90 normal  Smooth muscle negative  ESR normal  Mitochondrial negative    09/2011  dsDNA 10 (N<9)  CCP negative     06/2012  dsDNA negative  RNP negative  Gaming negative  SCL 70 negative   SSA negative  SSB negative

## 2021-12-15 ENCOUNTER — HOSPITAL ENCOUNTER (OUTPATIENT)
Dept: ULTRASOUND IMAGING | Age: 51
Discharge: HOME | End: 2021-12-15
Attending: SURGERY
Payer: COMMERCIAL

## 2021-12-15 ENCOUNTER — HOSPITAL ENCOUNTER (OUTPATIENT)
Dept: MAMMOGRAPHY | Age: 51
Discharge: HOME | End: 2021-12-15
Attending: SURGERY
Payer: COMMERCIAL

## 2021-12-15 DIAGNOSIS — N63.10 BREAST MASS, RIGHT: ICD-10-CM

## 2021-12-15 PROCEDURE — 76642 ULTRASOUND BREAST LIMITED: CPT | Performed by: SURGERY

## 2021-12-15 PROCEDURE — 77066 DX MAMMO INCL CAD BI: CPT | Performed by: SURGERY

## 2021-12-15 PROCEDURE — 77062 BREAST TOMOSYNTHESIS BI: CPT | Performed by: SURGERY

## 2021-12-16 NOTE — PROGRESS NOTES
Notified patient and made follow up appointment.      Future Appointments  1/19/2022  8:15 AM    Chanell Perez MD    [facility]           [facility]

## 2022-01-04 ENCOUNTER — PATIENT MESSAGE (OUTPATIENT)
Dept: RHEUMATOLOGY | Facility: CLINIC | Age: 52
End: 2022-01-04

## 2022-01-04 DIAGNOSIS — R79.89 ABNORMAL LFTS: ICD-10-CM

## 2022-01-04 DIAGNOSIS — M32.9 SYSTEMIC LUPUS ERYTHEMATOSUS, UNSPECIFIED SLE TYPE, UNSPECIFIED ORGAN INVOLVEMENT STATUS (HCC): Primary | ICD-10-CM

## 2022-01-09 LAB
ACTIN (SMOOTH MUSCLE)$ANTIBODY (IGG): <20 U
ALBUMIN/GLOBULIN RATIO: 1.6 (CALC) (ref 1–2.5)
ALBUMIN: 4.1 G/DL (ref 3.6–5.1)
ALKALINE PHOSPHATASE: 125 U/L (ref 37–153)
ALT: 116 U/L (ref 6–29)
ANA SCREEN, IFA: POSITIVE
AST: 60 U/L (ref 10–35)
BILIRUBIN, DIRECT: 0.1 MG/DL
BILIRUBIN, INDIRECT: 0.3 MG/DL (CALC) (ref 0.2–1.2)
BILIRUBIN, TOTAL: 0.4 MG/DL (ref 0.2–1.2)
GLOBULIN: 2.5 G/DL (CALC) (ref 1.9–3.7)
LKM-1 ANTIBODY (IGG): <20 U
MITOCHONDRIAL AB SCREEN: NEGATIVE
PROTEIN, TOTAL: 6.6 G/DL (ref 6.1–8.1)
VITAMIN D, 25-OH, TOTAL: 32 NG/ML (ref 30–100)

## 2022-01-10 LAB
ANA SCREEN, IFA: POSITIVE
LKM-1 ANTIBODY (IGG): <20 U
MITOCHONDRIAL AB SCREEN: NEGATIVE
SMOOTH MUSCLE AB SCREEN: NEGATIVE

## 2022-01-28 ENCOUNTER — OFFICE VISIT (OUTPATIENT)
Dept: SURGERY | Facility: CLINIC | Age: 52
End: 2022-01-28
Payer: COMMERCIAL

## 2022-01-28 VITALS — TEMPERATURE: 97 F | BODY MASS INDEX: 24.45 KG/M2 | HEIGHT: 64.5 IN | WEIGHT: 145 LBS

## 2022-01-28 DIAGNOSIS — R92.8 ABNORMAL MAMMOGRAM OF RIGHT BREAST: Primary | ICD-10-CM

## 2022-01-28 DIAGNOSIS — Z12.31 SCREENING MAMMOGRAM, ENCOUNTER FOR: ICD-10-CM

## 2022-01-28 DIAGNOSIS — R63.5 WEIGHT GAIN: ICD-10-CM

## 2022-01-28 PROCEDURE — 99212 OFFICE O/P EST SF 10 MIN: CPT | Performed by: SURGERY

## 2022-01-28 PROCEDURE — 3008F BODY MASS INDEX DOCD: CPT | Performed by: SURGERY

## 2022-01-28 NOTE — H&P
New Patient Visit Note       Active Problems      1. Abnormal mammogram of right breast    2. Screening mammogram, encounter for    3.  Weight gain        Chief Complaint   Patient presents with:  Breast Problem: mamogram/breast f/u pt denies any breast naif hemorrhoid    • Kidney stone     left side, atrophy to kidney   • Leiomyoma of uterus, unspecified 6/29/2012   • Low iron 5/30/2013   • Lupus ([facility] [address] 2/6/2013   • Menorrhagia 5/30/2013   • Pain in joint, multiple sites 6/29/2012   • PE (pulmonary embolism) 1 (alzheimer's) Paternal Grandmother    • Heart Disease Paternal Grandfather    • Heart Disease Maternal Grandfather    • Other (Other) Sister         Sherri   • No Known Problems Brother    • Cancer Maternal Aunt         Pancreatic Ca   • Cancer Maternal change. HENT: Negative for hearing loss, nosebleeds, sore throat and trouble swallowing. Respiratory: Negative for apnea, cough, shortness of breath and wheezing. Cardiovascular: Negative for chest pain, palpitations and leg swelling.    Gastrointes place, and time. Psychiatric:         Speech: Speech normal.         Behavior: Behavior normal.           MAMMOGRAM   I personally viewed the films and agree with the radiologist's interpretation.     FINDINGS:     There is a persistent mass in the retroa     Dictated by (CST): Micah May MD on 12/15/2021        Assessment   Abnormal mammogram of right breast  (primary encounter diagnosis)  Screening mammogram, encounter for  Weight gain      Plan   · The patient has a benign mammogram and exam.  · Sh

## 2022-02-08 LAB
ALBUMIN/GLOBULIN RATIO: 1.7 (CALC) (ref 1–2.5)
ALBUMIN: 4 G/DL (ref 3.6–5.1)
ALKALINE PHOSPHATASE: 97 U/L (ref 37–153)
ALT: 44 U/L (ref 6–29)
AST: 30 U/L (ref 10–35)
BILIRUBIN, DIRECT: 0.1 MG/DL
BILIRUBIN, INDIRECT: 0.3 MG/DL (CALC) (ref 0.2–1.2)
BILIRUBIN, TOTAL: 0.4 MG/DL (ref 0.2–1.2)
GLOBULIN: 2.4 G/DL (CALC) (ref 1.9–3.7)
PROTEIN, TOTAL: 6.4 G/DL (ref 6.1–8.1)

## 2022-02-11 LAB
Lab: 5.6 MCG/DL (ref 4.8–10.4)
T3, FREE: 3 PG/ML (ref 2.3–4.2)
T3, TOTAL: 85 NG/DL (ref 76–181)
T4, FREE: 1.2 NG/DL (ref 0.8–1.8)
TSH: 1.28 MIU/L

## 2022-02-21 ENCOUNTER — OFFICE VISIT (OUTPATIENT)
Dept: SURGERY | Facility: CLINIC | Age: 52
End: 2022-02-21
Payer: COMMERCIAL

## 2022-02-21 VITALS
DIASTOLIC BLOOD PRESSURE: 70 MMHG | WEIGHT: 150 LBS | SYSTOLIC BLOOD PRESSURE: 106 MMHG | HEART RATE: 66 BPM | OXYGEN SATURATION: 99 % | BODY MASS INDEX: 25 KG/M2 | RESPIRATION RATE: 16 BRPM

## 2022-02-21 DIAGNOSIS — R79.89 ELEVATED LIVER FUNCTION TESTS: Primary | ICD-10-CM

## 2022-02-21 RX ORDER — PREDNISONE 1 MG/1
TABLET ORAL
COMMUNITY
Start: 2021-12-14

## 2022-04-11 ENCOUNTER — HOSPITAL ENCOUNTER (OUTPATIENT)
Dept: GENERAL RADIOLOGY | Age: 52
Discharge: HOME | End: 2022-04-11
Attending: INTERNAL MEDICINE
Payer: COMMERCIAL

## 2022-04-11 ENCOUNTER — TELEMEDICINE (OUTPATIENT)
Dept: RHEUMATOLOGY | Facility: CLINIC | Age: 52
End: 2022-04-11
Payer: COMMERCIAL

## 2022-04-11 VITALS — BODY MASS INDEX: 22.94 KG/M2 | HEIGHT: 64.5 IN | WEIGHT: 136 LBS

## 2022-04-11 DIAGNOSIS — R06.02 SHORTNESS OF BREATH: ICD-10-CM

## 2022-04-11 DIAGNOSIS — Z86.79 HISTORY OF PERICARDITIS: ICD-10-CM

## 2022-04-11 DIAGNOSIS — R79.89 ABNORMAL LFTS: ICD-10-CM

## 2022-04-11 DIAGNOSIS — M32.9 SYSTEMIC LUPUS ERYTHEMATOSUS, UNSPECIFIED SLE TYPE, UNSPECIFIED ORGAN INVOLVEMENT STATUS (HCC): ICD-10-CM

## 2022-04-11 DIAGNOSIS — Z79.52 CURRENT CHRONIC USE OF SYSTEMIC STEROIDS: ICD-10-CM

## 2022-04-11 DIAGNOSIS — M32.9 SYSTEMIC LUPUS ERYTHEMATOSUS, UNSPECIFIED SLE TYPE, UNSPECIFIED ORGAN INVOLVEMENT STATUS (HCC): Primary | ICD-10-CM

## 2022-04-11 DIAGNOSIS — M85.89 OSTEOPENIA OF MULTIPLE SITES: ICD-10-CM

## 2022-04-11 DIAGNOSIS — Z79.899 LONG-TERM USE OF PLAQUENIL: ICD-10-CM

## 2022-04-11 PROCEDURE — 71046 X-RAY EXAM CHEST 2 VIEWS: CPT | Performed by: INTERNAL MEDICINE

## 2022-04-11 PROCEDURE — 3008F BODY MASS INDEX DOCD: CPT | Performed by: INTERNAL MEDICINE

## 2022-04-11 PROCEDURE — 99214 OFFICE O/P EST MOD 30 MIN: CPT | Performed by: INTERNAL MEDICINE

## 2022-04-11 RX ORDER — PREDNISONE 1 MG/1
3 TABLET ORAL
Qty: 270 TABLET | Refills: 1 | Status: SHIPPED | OUTPATIENT
Start: 2022-04-11 | End: 2022-07-10

## 2022-04-11 RX ORDER — HYDROXYCHLOROQUINE SULFATE 200 MG/1
TABLET, FILM COATED ORAL
Qty: 180 TABLET | Refills: 1 | Status: SHIPPED | OUTPATIENT
Start: 2022-04-11

## 2022-04-12 ENCOUNTER — TELEPHONE (OUTPATIENT)
Dept: RHEUMATOLOGY | Facility: CLINIC | Age: 52
End: 2022-04-12

## 2022-04-16 ENCOUNTER — HOSPITAL ENCOUNTER (OUTPATIENT)
Dept: CT IMAGING | Age: 52
Discharge: HOME | End: 2022-04-16
Attending: INTERNAL MEDICINE
Payer: COMMERCIAL

## 2022-04-16 ENCOUNTER — TELEPHONE (OUTPATIENT)
Dept: RHEUMATOLOGY | Facility: CLINIC | Age: 52
End: 2022-04-16

## 2022-04-16 DIAGNOSIS — R93.89 ABNORMAL CXR: ICD-10-CM

## 2022-04-16 DIAGNOSIS — R06.02 SHORTNESS OF BREATH: ICD-10-CM

## 2022-04-16 DIAGNOSIS — M32.9 SYSTEMIC LUPUS ERYTHEMATOSUS, UNSPECIFIED SLE TYPE, UNSPECIFIED ORGAN INVOLVEMENT STATUS (HCC): ICD-10-CM

## 2022-04-16 DIAGNOSIS — R05.9 COUGH: ICD-10-CM

## 2022-04-16 LAB — CREAT BLD-MCNC: 1 MG/DL

## 2022-04-16 PROCEDURE — 71260 CT THORAX DX C+: CPT | Performed by: INTERNAL MEDICINE

## 2022-04-16 PROCEDURE — 82565 ASSAY OF CREATININE: CPT

## 2022-04-16 RX ORDER — IOHEXOL 350 MG/ML
75 INJECTION, SOLUTION INTRAVENOUS
Status: COMPLETED | OUTPATIENT
Start: 2022-04-16 | End: 2022-04-16

## 2022-04-16 RX ADMIN — IOHEXOL 75 ML: 350 INJECTION, SOLUTION INTRAVENOUS at 12:55:00

## 2022-04-16 NOTE — TELEPHONE ENCOUNTER
Messaged by Dr. Arleth Fisher from radiology. Concerning findings for Covid pneumonia and probable matted, compressed small bowel, however cannot rule out mass. Called patient. She noted she felt tired starting March 30, Monday, April 4 she felt quite unwell. She felt quite terrible on April 11. Stayed home from work. Today she is feeling great, she is back to 95% of her baseline. Cough is almost resolved. She was able to vacuum and clean today. I note that she may have had Covid pneumonia however radiographic features that may lag her clinical status. I recommend her to monitor her pulse ox, if <95% call me back on after hours line. I recommend watching out for any close contacts and their symptoms as they may have been exposed to Covid, she may shed virus for longer than usual given her chronic prednisone use. Noted to patient small area in the left upper quadrant that may represent matted small bowel, however would consider getting a CT abdomen with IV and oral contrast per radiology recommendations that Dr. Ankur Paez would order if indicated, will forward message to her.

## 2022-04-18 NOTE — TELEPHONE ENCOUNTER
Called pt and reiterated what Dr. Jill Rivera had discussed over the weekend. Pt states she is feeling much better overall. Slight cough but no significant shortness of breath at this time. Due to possible pna vs ild changes, and given her extensive SLE hx, I recommend repeating CT lungs in 3 months. Noted was possible mass like opacification in LUQ. Will order CT abd with IV and oral contrast to work up further. Patient verbalized understanding of above instructions. No further questions at this time.     Thor Shaver DO  [facility]  4/18/2022

## 2022-04-18 NOTE — TELEPHONE ENCOUNTER
PA for CT abdomen approved per [employer]. [facility]#[id number]  Auth# [id number] effective 4/18/22-6/2/22  Phoned pt to notify of approval and ok to call central scheduling to schedule.

## 2022-04-29 ENCOUNTER — HOSPITAL ENCOUNTER (OUTPATIENT)
Dept: CT IMAGING | Age: 52
Discharge: HOME | End: 2022-04-29
Attending: INTERNAL MEDICINE
Payer: COMMERCIAL

## 2022-04-29 DIAGNOSIS — R93.3 ABNORMAL CT SCAN, SMALL BOWEL: ICD-10-CM

## 2022-04-29 DIAGNOSIS — M32.9 SYSTEMIC LUPUS ERYTHEMATOSUS, UNSPECIFIED SLE TYPE, UNSPECIFIED ORGAN INVOLVEMENT STATUS (HCC): ICD-10-CM

## 2022-04-29 PROCEDURE — 74160 CT ABDOMEN W/CONTRAST: CPT | Performed by: INTERNAL MEDICINE

## 2022-04-29 RX ORDER — IOHEXOL 350 MG/ML
80 INJECTION, SOLUTION INTRAVENOUS
Status: COMPLETED | OUTPATIENT
Start: 2022-04-29 | End: 2022-04-29

## 2022-04-29 RX ADMIN — IOHEXOL 80 ML: 350 INJECTION, SOLUTION INTRAVENOUS at 09:34:00

## 2022-06-30 LAB
ABSOLUTE BASOPHILS: 52 CELLS/UL (ref 0–200)
ABSOLUTE EOSINOPHILS: 22 CELLS/UL (ref 15–500)
ABSOLUTE LYMPHOCYTES: 755 CELLS/UL (ref 850–3900)
ABSOLUTE MONOCYTES: 481 CELLS/UL (ref 200–950)
ABSOLUTE NEUTROPHILS: 6090 CELLS/UL (ref 1500–7800)
ALBUMIN/GLOBULIN RATIO: 1.7 (CALC) (ref 1–2.5)
ALBUMIN: 4.1 G/DL (ref 3.6–5.1)
ALKALINE PHOSPHATASE: 78 U/L (ref 37–153)
ALT: 26 U/L (ref 6–29)
APPEARANCE: CLEAR
AST: 27 U/L (ref 10–35)
BASOPHILS: 0.7 %
BILIRUBIN, TOTAL: 0.5 MG/DL (ref 0.2–1.2)
BILIRUBIN: NEGATIVE
BUN: 21 MG/DL (ref 7–25)
C-REACTIVE PROTEIN: 2 MG/L
CALCIUM: 9.1 MG/DL (ref 8.6–10.4)
CARBON DIOXIDE: 28 MMOL/L (ref 20–32)
CHLORIDE: 100 MMOL/L (ref 98–110)
COLOR: YELLOW
COMPLEMENT COMPONENT C3C: 77 MG/DL (ref 83–193)
COMPLEMENT COMPONENT C4C: 21 MG/DL (ref 15–57)
CREATININE: 0.97 MG/DL (ref 0.5–1.05)
DNA AB (DS) CRITHIDIA,IFA: NEGATIVE
EGFR IF AFRICN AM: 78 ML/MIN/1.73M2
EGFR IF NONAFRICN AM: 68 ML/MIN/1.73M2
EOSINOPHILS: 0.3 %
GLOBULIN: 2.4 G/DL (CALC) (ref 1.9–3.7)
GLUCOSE: 153 MG/DL (ref 65–139)
GLUCOSE: NEGATIVE
HEMATOCRIT: 37.8 % (ref 35–45)
HEMOGLOBIN: 12.9 G/DL (ref 11.7–15.5)
LYMPHOCYTES: 10.2 %
MCH: 29.6 PG (ref 27–33)
MCHC: 34.1 G/DL (ref 32–36)
MCV: 86.7 FL (ref 80–100)
MONOCYTES: 6.5 %
MPV: 9.9 FL (ref 7.5–12.5)
NEUTROPHILS: 82.3 %
NITRITE: NEGATIVE
OCCULT BLOOD: NEGATIVE
PH: 5.5 (ref 5–8)
PLATELET COUNT: 164 THOUSAND/UL (ref 140–400)
POTASSIUM: 4.1 MMOL/L (ref 3.5–5.3)
PROTEIN, TOTAL: 6.5 G/DL (ref 6.1–8.1)
PROTEIN: NEGATIVE
RDW: 12.4 % (ref 11–15)
RED BLOOD CELL COUNT: 4.36 MILLION/UL (ref 3.8–5.1)
SED RATE BY MODIFIED$WESTERGREN: 6 MM/H
SODIUM: 135 MMOL/L (ref 135–146)
SPECIFIC GRAVITY: 1.01 (ref 1–1.03)
VITAMIN D, 25-OH, TOTAL: 46 NG/ML (ref 30–100)
WHITE BLOOD CELL COUNT: 7.4 THOUSAND/UL (ref 3.8–10.8)

## 2022-07-01 ENCOUNTER — TELEPHONE (OUTPATIENT)
Dept: FAMILY MEDICINE CLINIC | Facility: CLINIC | Age: 52
End: 2022-07-01

## 2022-07-01 RX ORDER — NITROFURANTOIN 25; 75 MG/1; MG/1
100 CAPSULE ORAL 2 TIMES DAILY
Qty: 14 CAPSULE | Refills: 0 | Status: SHIPPED | OUTPATIENT
Start: 2022-07-01 | End: 2022-07-08

## 2022-07-01 NOTE — TELEPHONE ENCOUNTER
- Pt had labs done and there is an indication that pt may have a UTI. Pt has not been seen since 2021. Declined appt. Pt would like an rx for UTI. Patient has no symptoms. Patient did send [employer] message also. Please advise [phone]    Pt will schedule Annual online.

## 2022-07-01 NOTE — TELEPHONE ENCOUNTER
Starr Castro MD  [facility] 1 hour ago (11:18 AM)         Patricia Jaquez is not a sulfa drug. She ma take it. Message text      Spoke to patient. Patient advised. Verbalized understanding.    RX sent

## 2022-07-01 NOTE — TELEPHONE ENCOUNTER
Spoke to patient. Because she has lupus- she thinks she is not supposed to talk sulf drugs since it can make lupus sx worse.      Please advise

## 2022-07-26 NOTE — PROGRESS NOTES
Dr. Tiburcio Perez,  Pt last saw you in April 2020, could you put in order for right breast US+bilat mammogram. Patient viewed results via [employer].      Seen by patient Rod Ignacio on 7/26/2022  9:21 AM    No problem list has been created

## 2022-07-28 ENCOUNTER — OFFICE VISIT (OUTPATIENT)
Dept: FAMILY MEDICINE CLINIC | Facility: CLINIC | Age: 52
End: 2022-07-28
Payer: COMMERCIAL

## 2022-07-28 VITALS
OXYGEN SATURATION: 98 % | TEMPERATURE: 98 F | DIASTOLIC BLOOD PRESSURE: 60 MMHG | HEIGHT: 64.5 IN | RESPIRATION RATE: 16 BRPM | WEIGHT: 148 LBS | SYSTOLIC BLOOD PRESSURE: 110 MMHG | HEART RATE: 61 BPM | BODY MASS INDEX: 24.96 KG/M2

## 2022-07-28 DIAGNOSIS — Z00.00 LABORATORY EXAM ORDERED AS PART OF ROUTINE GENERAL MEDICAL EXAMINATION: ICD-10-CM

## 2022-07-28 DIAGNOSIS — Z00.00 WELLNESS EXAMINATION: Primary | ICD-10-CM

## 2022-07-28 PROCEDURE — 3008F BODY MASS INDEX DOCD: CPT | Performed by: FAMILY MEDICINE

## 2022-07-28 PROCEDURE — 3074F SYST BP LT 130 MM HG: CPT | Performed by: FAMILY MEDICINE

## 2022-07-28 PROCEDURE — 99396 PREV VISIT EST AGE 40-64: CPT | Performed by: FAMILY MEDICINE

## 2022-07-28 PROCEDURE — 3078F DIAST BP <80 MM HG: CPT | Performed by: FAMILY MEDICINE

## 2022-07-28 RX ORDER — PREDNISONE 1 MG/1
3 TABLET ORAL
COMMUNITY

## 2022-08-08 ENCOUNTER — OFFICE VISIT (OUTPATIENT)
Dept: RHEUMATOLOGY | Facility: CLINIC | Age: 52
End: 2022-08-08
Payer: COMMERCIAL

## 2022-08-08 VITALS
OXYGEN SATURATION: 97 % | DIASTOLIC BLOOD PRESSURE: 74 MMHG | RESPIRATION RATE: 16 BRPM | HEART RATE: 83 BPM | WEIGHT: 151 LBS | HEIGHT: 64 IN | SYSTOLIC BLOOD PRESSURE: 122 MMHG | BODY MASS INDEX: 25.78 KG/M2

## 2022-08-08 DIAGNOSIS — Z79.899 LONG-TERM USE OF PLAQUENIL: ICD-10-CM

## 2022-08-08 DIAGNOSIS — Z79.52 CURRENT CHRONIC USE OF SYSTEMIC STEROIDS: ICD-10-CM

## 2022-08-08 DIAGNOSIS — M32.9 SYSTEMIC LUPUS ERYTHEMATOSUS, UNSPECIFIED SLE TYPE, UNSPECIFIED ORGAN INVOLVEMENT STATUS (HCC): Primary | ICD-10-CM

## 2022-08-08 DIAGNOSIS — R93.89 ABNORMAL CT SCAN, CHEST: ICD-10-CM

## 2022-08-08 DIAGNOSIS — R79.89 ABNORMAL LFTS: ICD-10-CM

## 2022-08-08 DIAGNOSIS — Z86.79 HISTORY OF PERICARDITIS: ICD-10-CM

## 2022-08-08 PROCEDURE — 99214 OFFICE O/P EST MOD 30 MIN: CPT | Performed by: INTERNAL MEDICINE

## 2022-08-08 PROCEDURE — 3074F SYST BP LT 130 MM HG: CPT | Performed by: INTERNAL MEDICINE

## 2022-08-08 PROCEDURE — 3078F DIAST BP <80 MM HG: CPT | Performed by: INTERNAL MEDICINE

## 2022-08-08 PROCEDURE — 3008F BODY MASS INDEX DOCD: CPT | Performed by: INTERNAL MEDICINE

## 2022-08-08 RX ORDER — HYDROXYCHLOROQUINE SULFATE 200 MG/1
TABLET, FILM COATED ORAL
Qty: 180 TABLET | Refills: 1 | Status: SHIPPED | OUTPATIENT
Start: 2022-08-08

## 2022-08-08 RX ORDER — PREDNISONE 1 MG/1
3 TABLET ORAL
Qty: 270 TABLET | Refills: 1 | Status: SHIPPED | OUTPATIENT
Start: 2022-08-08 | End: 2022-11-06

## 2022-08-11 NOTE — PROGRESS NOTES
Phoned [employer] to initiate PA CT chest. Pa approved. #[id number] effective 8/11/22-9/25/22. Phoned pt, notified of approval and to call CS to schedule.

## 2022-11-08 ENCOUNTER — APPOINTMENT (OUTPATIENT)
Dept: GENERAL RADIOLOGY | Age: 52
End: 2022-11-08
Attending: NURSE PRACTITIONER
Payer: COMMERCIAL

## 2022-11-08 ENCOUNTER — HOSPITAL ENCOUNTER (OUTPATIENT)
Age: 52
Discharge: HOME | End: 2022-11-08
Payer: COMMERCIAL

## 2022-11-08 VITALS
DIASTOLIC BLOOD PRESSURE: 84 MMHG | SYSTOLIC BLOOD PRESSURE: 148 MMHG | WEIGHT: 138 LBS | TEMPERATURE: 99 F | OXYGEN SATURATION: 93 % | HEART RATE: 113 BPM | RESPIRATION RATE: 18 BRPM | HEIGHT: 64 IN | BODY MASS INDEX: 23.56 KG/M2

## 2022-11-08 DIAGNOSIS — H10.33 ACUTE CONJUNCTIVITIS OF BOTH EYES, UNSPECIFIED ACUTE CONJUNCTIVITIS TYPE: ICD-10-CM

## 2022-11-08 DIAGNOSIS — J40 SINOBRONCHITIS: Primary | ICD-10-CM

## 2022-11-08 DIAGNOSIS — J32.9 SINOBRONCHITIS: Primary | ICD-10-CM

## 2022-11-08 LAB
POCT INFLUENZA A: NEGATIVE
POCT INFLUENZA B: NEGATIVE
SARS-COV-2 RNA RESP QL NAA+PROBE: NOT DETECTED

## 2022-11-08 PROCEDURE — A9150 MISC/EXPER NON-PRESCRIPT DRU: HCPCS | Performed by: NURSE PRACTITIONER

## 2022-11-08 PROCEDURE — 99214 OFFICE O/P EST MOD 30 MIN: CPT | Performed by: NURSE PRACTITIONER

## 2022-11-08 PROCEDURE — 87502 INFLUENZA DNA AMP PROBE: CPT | Performed by: NURSE PRACTITIONER

## 2022-11-08 PROCEDURE — U0002 COVID-19 LAB TEST NON-CDC: HCPCS | Performed by: NURSE PRACTITIONER

## 2022-11-08 PROCEDURE — 71046 X-RAY EXAM CHEST 2 VIEWS: CPT | Performed by: NURSE PRACTITIONER

## 2022-11-08 RX ORDER — ACETAMINOPHEN 500 MG
1000 TABLET ORAL ONCE
Status: COMPLETED | OUTPATIENT
Start: 2022-11-08 | End: 2022-11-08

## 2022-11-08 RX ORDER — BENZONATATE 100 MG/1
100 CAPSULE ORAL 3 TIMES DAILY PRN
Qty: 30 CAPSULE | Refills: 0 | Status: SHIPPED | OUTPATIENT
Start: 2022-11-08 | End: 2022-12-08

## 2022-11-08 RX ORDER — POLYMYXIN B SULFATE AND TRIMETHOPRIM 1; 10000 MG/ML; [USP'U]/ML
1 SOLUTION OPHTHALMIC
Qty: 1 EACH | Refills: 0 | Status: SHIPPED | OUTPATIENT
Start: 2022-11-08 | End: 2022-11-13

## 2022-11-08 RX ORDER — AMOXICILLIN AND CLAVULANATE POTASSIUM 875; 125 MG/1; MG/1
1 TABLET, FILM COATED ORAL 2 TIMES DAILY
Qty: 20 TABLET | Refills: 0 | Status: SHIPPED | OUTPATIENT
Start: 2022-11-08 | End: 2022-11-18

## 2022-11-08 NOTE — ED INITIAL ASSESSMENT (HPI)
Patient c/o fever, nasal congestion, headache, and cough since Sunday. Woke today with bilateral eyes draining.

## 2022-11-08 NOTE — DISCHARGE INSTRUCTIONS
Please take Augmentin as prescribed. Eyedrops as prescribed. Tessalon Perles for coughing. Continue albuterol inhaler 2 puffs every 4 hours as needed. Follow closely with your primary. ER if worse.

## 2022-11-11 ENCOUNTER — TELEMEDICINE (OUTPATIENT)
Dept: FAMILY MEDICINE CLINIC | Facility: CLINIC | Age: 52
End: 2022-11-11
Payer: COMMERCIAL

## 2022-11-11 ENCOUNTER — TELEPHONE (OUTPATIENT)
Dept: FAMILY MEDICINE CLINIC | Facility: CLINIC | Age: 52
End: 2022-11-11

## 2022-11-11 DIAGNOSIS — R09.81 NASAL CONGESTION: Primary | ICD-10-CM

## 2022-11-11 DIAGNOSIS — R05.1 ACUTE COUGH: ICD-10-CM

## 2022-11-11 DIAGNOSIS — R09.81 NASAL CONGESTION: ICD-10-CM

## 2022-11-11 PROCEDURE — 99213 OFFICE O/P EST LOW 20 MIN: CPT | Performed by: FAMILY MEDICINE

## 2022-11-11 RX ORDER — CODEINE PHOSPHATE AND GUAIFENESIN 10; 100 MG/5ML; MG/5ML
5 SOLUTION ORAL EVERY 6 HOURS PRN
Qty: 180 ML | Refills: 0 | Status: SHIPPED | OUTPATIENT
Start: 2022-11-11

## 2022-11-11 RX ORDER — FLUTICASONE PROPIONATE 50 MCG
2 SPRAY, SUSPENSION (ML) NASAL DAILY
Qty: 1 EACH | Refills: 0 | Status: SHIPPED | OUTPATIENT
Start: 2022-11-11 | End: 2023-11-06

## 2022-11-11 NOTE — TELEPHONE ENCOUNTER
Patient calling, was seen at [facility] on 11-8. Patient states she was given medication for cough, no improvement. Patient requesting medication, declined VV. Patient wants to have something for the weekend, does not want to go back to [address] [place] [address].  Patient also sent [employer] message, please advise

## 2022-11-12 RX ORDER — FLUTICASONE PROPIONATE 50 MCG
SPRAY, SUSPENSION (ML) NASAL
Qty: 48 G | Refills: 0 | OUTPATIENT
Start: 2022-11-12

## 2022-11-23 ENCOUNTER — APPOINTMENT (OUTPATIENT)
Dept: GENERAL RADIOLOGY | Age: 52
End: 2022-11-23
Attending: EMERGENCY MEDICINE
Payer: COMMERCIAL

## 2022-11-23 ENCOUNTER — HOSPITAL ENCOUNTER (EMERGENCY)
Age: 52
Discharge: HOME | End: 2022-11-24
Attending: EMERGENCY MEDICINE
Payer: COMMERCIAL

## 2022-11-23 DIAGNOSIS — R09.02 HYPOXIA: ICD-10-CM

## 2022-11-23 DIAGNOSIS — J11.1 INFLUENZA: Primary | ICD-10-CM

## 2022-11-23 LAB
BASOPHILS # BLD AUTO: 0.05 X10(3) UL (ref 0–0.2)
BASOPHILS NFR BLD AUTO: 0.5 %
EOSINOPHIL # BLD AUTO: 0.09 X10(3) UL (ref 0–0.7)
EOSINOPHIL NFR BLD AUTO: 0.9 %
ERYTHROCYTE [DISTWIDTH] IN BLOOD BY AUTOMATED COUNT: 12.5 %
HCT VFR BLD AUTO: 35.9 %
HGB BLD-MCNC: 12.5 G/DL
IMM GRANULOCYTES # BLD AUTO: 0.03 X10(3) UL (ref 0–1)
IMM GRANULOCYTES NFR BLD: 0.3 %
LYMPHOCYTES # BLD AUTO: 0.74 X10(3) UL (ref 1–4)
LYMPHOCYTES NFR BLD AUTO: 7.2 %
MCH RBC QN AUTO: 29.8 PG (ref 26–34)
MCHC RBC AUTO-ENTMCNC: 34.8 G/DL (ref 31–37)
MCV RBC AUTO: 85.5 FL
MONOCYTES # BLD AUTO: 1.02 X10(3) UL (ref 0.1–1)
MONOCYTES NFR BLD AUTO: 10 %
NEUTROPHILS # BLD AUTO: 8.3 X10 (3) UL (ref 1.5–7.7)
NEUTROPHILS # BLD AUTO: 8.3 X10(3) UL (ref 1.5–7.7)
NEUTROPHILS NFR BLD AUTO: 81.1 %
PLATELET # BLD AUTO: 183 10(3)UL (ref 150–450)
RBC # BLD AUTO: 4.2 X10(6)UL
WBC # BLD AUTO: 10.2 X10(3) UL (ref 4–11)

## 2022-11-23 PROCEDURE — 85025 COMPLETE CBC W/AUTO DIFF WBC: CPT | Performed by: EMERGENCY MEDICINE

## 2022-11-23 PROCEDURE — 93010 ELECTROCARDIOGRAM REPORT: CPT

## 2022-11-23 PROCEDURE — 80053 COMPREHEN METABOLIC PANEL: CPT | Performed by: EMERGENCY MEDICINE

## 2022-11-23 PROCEDURE — 99285 EMERGENCY DEPT VISIT HI MDM: CPT

## 2022-11-23 PROCEDURE — 84484 ASSAY OF TROPONIN QUANT: CPT | Performed by: EMERGENCY MEDICINE

## 2022-11-23 PROCEDURE — 93005 ELECTROCARDIOGRAM TRACING: CPT

## 2022-11-23 PROCEDURE — 96374 THER/PROPH/DIAG INJ IV PUSH: CPT

## 2022-11-23 PROCEDURE — 85379 FIBRIN DEGRADATION QUANT: CPT | Performed by: EMERGENCY MEDICINE

## 2022-11-23 PROCEDURE — 71045 X-RAY EXAM CHEST 1 VIEW: CPT | Performed by: EMERGENCY MEDICINE

## 2022-11-23 PROCEDURE — 87502 INFLUENZA DNA AMP PROBE: CPT | Performed by: EMERGENCY MEDICINE

## 2022-11-23 RX ORDER — METHYLPREDNISOLONE SODIUM SUCCINATE 125 MG/2ML
125 INJECTION, POWDER, LYOPHILIZED, FOR SOLUTION INTRAMUSCULAR; INTRAVENOUS ONCE
Status: COMPLETED | OUTPATIENT
Start: 2022-11-23 | End: 2022-11-23

## 2022-11-23 RX ORDER — ALBUTEROL SULFATE 90 UG/1
8 AEROSOL, METERED RESPIRATORY (INHALATION) ONCE
Status: COMPLETED | OUTPATIENT
Start: 2022-11-23 | End: 2022-11-23

## 2022-11-24 ENCOUNTER — APPOINTMENT (OUTPATIENT)
Dept: CT IMAGING | Age: 52
End: 2022-11-24
Attending: EMERGENCY MEDICINE
Payer: COMMERCIAL

## 2022-11-24 VITALS
HEART RATE: 98 BPM | RESPIRATION RATE: 22 BRPM | WEIGHT: 138 LBS | HEIGHT: 64 IN | DIASTOLIC BLOOD PRESSURE: 62 MMHG | OXYGEN SATURATION: 96 % | SYSTOLIC BLOOD PRESSURE: 110 MMHG | BODY MASS INDEX: 23.56 KG/M2 | TEMPERATURE: 99 F

## 2022-11-24 PROBLEM — J11.1 INFLUENZA: Status: ACTIVE | Noted: 2022-11-24

## 2022-11-24 LAB
ALBUMIN SERPL-MCNC: 3.3 G/DL (ref 3.4–5)
ALBUMIN/GLOB SERPL: 0.8 {RATIO} (ref 1–2)
ALP LIVER SERPL-CCNC: 163 U/L
ALT SERPL-CCNC: 62 U/L
ANION GAP SERPL CALC-SCNC: 7 MMOL/L (ref 0–18)
AST SERPL-CCNC: 44 U/L (ref 15–37)
BILIRUB SERPL-MCNC: 0.4 MG/DL (ref 0.1–2)
BUN BLD-MCNC: 19 MG/DL (ref 7–18)
CALCIUM BLD-MCNC: 8.7 MG/DL (ref 8.5–10.1)
CHLORIDE SERPL-SCNC: 101 MMOL/L (ref 98–112)
CO2 SERPL-SCNC: 28 MMOL/L (ref 21–32)
CREAT BLD-MCNC: 1.12 MG/DL
D DIMER PPP FEU-MCNC: 1.4 UG/ML FEU (ref ?–0.51)
GFR SERPLBLD BASED ON 1.73 SQ M-ARVRAT: 60 ML/MIN/1.73M2 (ref 60–?)
GLOBULIN PLAS-MCNC: 3.9 G/DL (ref 2.8–4.4)
GLUCOSE BLD-MCNC: 118 MG/DL (ref 70–99)
OSMOLALITY SERPL CALC.SUM OF ELEC: 285 MOSM/KG (ref 275–295)
POCT INFLUENZA A: POSITIVE
POCT INFLUENZA B: NEGATIVE
POTASSIUM SERPL-SCNC: 3.7 MMOL/L (ref 3.5–5.1)
PROT SERPL-MCNC: 7.2 G/DL (ref 6.4–8.2)
SARS-COV-2 RNA RESP QL NAA+PROBE: NOT DETECTED
SODIUM SERPL-SCNC: 136 MMOL/L (ref 136–145)
TROPONIN I HIGH SENSITIVITY: 6 NG/L

## 2022-11-24 PROCEDURE — 71275 CT ANGIOGRAPHY CHEST: CPT | Performed by: EMERGENCY MEDICINE

## 2022-11-24 RX ORDER — PREDNISONE 20 MG/1
40 TABLET ORAL DAILY
Qty: 10 TABLET | Refills: 0 | Status: SHIPPED | OUTPATIENT
Start: 2022-11-24 | End: 2022-11-24

## 2022-11-24 RX ORDER — ENOXAPARIN SODIUM 100 MG/ML
40 INJECTION SUBCUTANEOUS DAILY
Status: CANCELLED | OUTPATIENT
Start: 2022-11-24

## 2022-11-24 RX ORDER — SODIUM CHLORIDE 9 MG/ML
INJECTION, SOLUTION INTRAVENOUS CONTINUOUS
Status: CANCELLED | OUTPATIENT
Start: 2022-11-24

## 2022-11-24 RX ORDER — ALBUTEROL SULFATE 90 UG/1
2 AEROSOL, METERED RESPIRATORY (INHALATION) EVERY 6 HOURS PRN
Qty: 1 EACH | Refills: 0 | Status: SHIPPED | OUTPATIENT
Start: 2022-11-24

## 2022-11-24 RX ORDER — MAGNESIUM OXIDE 400 MG (241.3 MG MAGNESIUM) TABLET
3 TABLET NIGHTLY PRN
Status: CANCELLED | OUTPATIENT
Start: 2022-11-24

## 2022-11-24 RX ORDER — BISACODYL 10 MG
10 SUPPOSITORY, RECTAL RECTAL
Status: CANCELLED | OUTPATIENT
Start: 2022-11-24

## 2022-11-24 RX ORDER — OSELTAMIVIR PHOSPHATE 75 MG/1
75 CAPSULE ORAL 2 TIMES DAILY
Qty: 10 CAPSULE | Refills: 0 | Status: SHIPPED | OUTPATIENT
Start: 2022-11-24 | End: 2022-12-02 | Stop reason: CLARIF

## 2022-11-24 RX ORDER — ALBUTEROL SULFATE 90 UG/1
2 AEROSOL, METERED RESPIRATORY (INHALATION) EVERY 6 HOURS PRN
Qty: 1 EACH | Refills: 0 | Status: SHIPPED | OUTPATIENT
Start: 2022-11-24 | End: 2022-11-24

## 2022-11-24 RX ORDER — ACETAMINOPHEN 500 MG
500 TABLET ORAL EVERY 4 HOURS PRN
Status: CANCELLED | OUTPATIENT
Start: 2022-11-24

## 2022-11-24 RX ORDER — ALBUTEROL SULFATE 2.5 MG/3ML
2.5 SOLUTION RESPIRATORY (INHALATION) EVERY 2 HOUR PRN
Status: CANCELLED | OUTPATIENT
Start: 2022-11-24

## 2022-11-24 RX ORDER — SENNOSIDES 8.6 MG
17.2 TABLET ORAL NIGHTLY PRN
Status: CANCELLED | OUTPATIENT
Start: 2022-11-24

## 2022-11-24 RX ORDER — METHYLPREDNISOLONE SODIUM SUCCINATE 125 MG/2ML
60 INJECTION, POWDER, LYOPHILIZED, FOR SOLUTION INTRAMUSCULAR; INTRAVENOUS EVERY 12 HOURS
Status: CANCELLED | OUTPATIENT
Start: 2022-11-24

## 2022-11-24 RX ORDER — OSELTAMIVIR PHOSPHATE 75 MG/1
75 CAPSULE ORAL 2 TIMES DAILY
Qty: 10 CAPSULE | Refills: 0 | Status: SHIPPED | OUTPATIENT
Start: 2022-11-24 | End: 2022-11-24

## 2022-11-24 RX ORDER — BENZONATATE 100 MG/1
100 CAPSULE ORAL 3 TIMES DAILY PRN
Status: CANCELLED | OUTPATIENT
Start: 2022-11-24

## 2022-11-24 RX ORDER — PREDNISONE 20 MG/1
40 TABLET ORAL DAILY
Qty: 10 TABLET | Refills: 0 | Status: SHIPPED | OUTPATIENT
Start: 2022-11-24 | End: 2022-12-02 | Stop reason: CLARIF

## 2022-11-24 RX ORDER — ECHINACEA PURPUREA EXTRACT 125 MG
1 TABLET ORAL
Status: CANCELLED | OUTPATIENT
Start: 2022-11-24

## 2022-11-24 RX ORDER — BENZONATATE 100 MG/1
200 CAPSULE ORAL 3 TIMES DAILY PRN
Status: CANCELLED | OUTPATIENT
Start: 2022-11-24

## 2022-11-24 RX ORDER — ONDANSETRON 2 MG/ML
8 INJECTION INTRAMUSCULAR; INTRAVENOUS EVERY 6 HOURS PRN
Status: CANCELLED | OUTPATIENT
Start: 2022-11-24

## 2022-11-24 RX ORDER — POLYETHYLENE GLYCOL 3350 17 G/17G
17 POWDER, FOR SOLUTION ORAL DAILY PRN
Status: CANCELLED | OUTPATIENT
Start: 2022-11-24

## 2022-11-24 NOTE — RESPIRATORY THERAPY NOTE
Pt walked around the unit with pulse ox. Pt able to walk briskly, and talk while walking. Spo2 92 to 94%. Pt did not have an increase in shortness of breath.

## 2022-11-24 NOTE — ED INITIAL ASSESSMENT (HPI)
Pt to ed with c/o cough, runny nose and fever since Sunday.  PT reports \"having what they call reactive airway disease,\" sob on exertion   Hx of sinus infection

## 2022-11-25 LAB
ATRIAL RATE: 96 BPM
P AXIS: 81 DEGREES
P-R INTERVAL: 124 MS
Q-T INTERVAL: 362 MS
QRS DURATION: 84 MS
QTC CALCULATION (BEZET): 457 MS
R AXIS: 50 DEGREES
T AXIS: 59 DEGREES
VENTRICULAR RATE: 96 BPM

## 2022-11-29 ENCOUNTER — TELEPHONE (OUTPATIENT)
Dept: FAMILY MEDICINE CLINIC | Facility: CLINIC | Age: 52
End: 2022-11-29

## 2022-11-29 NOTE — TELEPHONE ENCOUNTER
Pt NOV 11/30/22. Pt requesting an order to get a chest x-ray prior to her visit. Pt was seen at ED 11/23/22. Pt states she feels worse. Pt would rather get the xray ahead of time than waiting until after seeing Dr and have to go. Please advise Pt if this can be ordered.

## 2022-11-30 ENCOUNTER — OFFICE VISIT (OUTPATIENT)
Dept: FAMILY MEDICINE CLINIC | Facility: CLINIC | Age: 52
End: 2022-11-30
Payer: COMMERCIAL

## 2022-11-30 ENCOUNTER — HOSPITAL ENCOUNTER (OUTPATIENT)
Dept: GENERAL RADIOLOGY | Age: 52
Discharge: HOME | End: 2022-11-30
Attending: FAMILY MEDICINE
Payer: COMMERCIAL

## 2022-11-30 VITALS
OXYGEN SATURATION: 93 % | RESPIRATION RATE: 18 BRPM | BODY MASS INDEX: 23.22 KG/M2 | SYSTOLIC BLOOD PRESSURE: 112 MMHG | TEMPERATURE: 98 F | HEIGHT: 64 IN | DIASTOLIC BLOOD PRESSURE: 70 MMHG | WEIGHT: 136 LBS | HEART RATE: 128 BPM

## 2022-11-30 DIAGNOSIS — R05.1 ACUTE COUGH: ICD-10-CM

## 2022-11-30 DIAGNOSIS — R05.1 ACUTE COUGH: Primary | ICD-10-CM

## 2022-11-30 DIAGNOSIS — J45.21 MILD INTERMITTENT ASTHMA WITH ACUTE EXACERBATION: ICD-10-CM

## 2022-11-30 DIAGNOSIS — J10.1 INFLUENZA A: ICD-10-CM

## 2022-11-30 PROCEDURE — 3008F BODY MASS INDEX DOCD: CPT | Performed by: FAMILY MEDICINE

## 2022-11-30 PROCEDURE — 3074F SYST BP LT 130 MM HG: CPT | Performed by: FAMILY MEDICINE

## 2022-11-30 PROCEDURE — 3078F DIAST BP <80 MM HG: CPT | Performed by: FAMILY MEDICINE

## 2022-11-30 PROCEDURE — 71046 X-RAY EXAM CHEST 2 VIEWS: CPT | Performed by: FAMILY MEDICINE

## 2022-11-30 PROCEDURE — 99214 OFFICE O/P EST MOD 30 MIN: CPT | Performed by: FAMILY MEDICINE

## 2022-11-30 RX ORDER — CEFPODOXIME PROXETIL 200 MG/1
200 TABLET, FILM COATED ORAL 2 TIMES DAILY
Qty: 20 TABLET | Refills: 0 | Status: SHIPPED | OUTPATIENT
Start: 2022-11-30 | End: 2022-12-04

## 2022-11-30 RX ORDER — CODEINE PHOSPHATE AND GUAIFENESIN 10; 100 MG/5ML; MG/5ML
5 SOLUTION ORAL EVERY 6 HOURS PRN
Qty: 180 ML | Refills: 0 | Status: SHIPPED | OUTPATIENT
Start: 2022-11-30

## 2022-11-30 RX ORDER — PREDNISONE 10 MG/1
TABLET ORAL
Qty: 23 TABLET | Refills: 0 | Status: SHIPPED | OUTPATIENT
Start: 2022-11-30 | End: 2022-12-04

## 2022-11-30 RX ORDER — ACETYLCYSTEINE 100 MG/ML
4 SOLUTION ORAL; RESPIRATORY (INHALATION) EVERY 6 HOURS PRN
Qty: 25 EACH | Refills: 0 | Status: SHIPPED | OUTPATIENT
Start: 2022-11-30

## 2022-11-30 RX ORDER — AZITHROMYCIN 250 MG/1
TABLET, FILM COATED ORAL
Qty: 6 TABLET | Refills: 0 | Status: SHIPPED | OUTPATIENT
Start: 2022-11-30 | End: 2022-12-04

## 2022-11-30 RX ORDER — PREDNISONE 1 MG/1
3 TABLET ORAL DAILY
COMMUNITY
Start: 2022-11-10 | End: 2022-12-04

## 2022-12-01 ENCOUNTER — HOSPITAL ENCOUNTER (INPATIENT)
Facility: HOSPITAL | Age: 52
LOS: 2 days | Discharge: HOME | End: 2022-12-04
Attending: EMERGENCY MEDICINE | Admitting: HOSPITALIST
Payer: COMMERCIAL

## 2022-12-01 DIAGNOSIS — R09.02 HYPOXIA: ICD-10-CM

## 2022-12-01 DIAGNOSIS — J98.01 ACUTE BRONCHOSPASM: Primary | ICD-10-CM

## 2022-12-01 DIAGNOSIS — J45.21 MILD INTERMITTENT ASTHMA WITH ACUTE EXACERBATION: ICD-10-CM

## 2022-12-02 PROBLEM — R09.02 HYPOXIA: Status: ACTIVE | Noted: 2022-12-02

## 2022-12-02 PROBLEM — J98.01 ACUTE BRONCHOSPASM: Status: ACTIVE | Noted: 2022-12-02

## 2022-12-02 LAB
ANION GAP SERPL CALC-SCNC: 6 MMOL/L (ref 0–18)
BASOPHILS # BLD AUTO: 0.05 X10(3) UL (ref 0–0.2)
BASOPHILS NFR BLD AUTO: 0.2 %
BUN BLD-MCNC: 31 MG/DL (ref 7–18)
CALCIUM BLD-MCNC: 8.7 MG/DL (ref 8.5–10.1)
CHLORIDE SERPL-SCNC: 105 MMOL/L (ref 98–112)
CO2 SERPL-SCNC: 26 MMOL/L (ref 21–32)
CREAT BLD-MCNC: 1.27 MG/DL
EOSINOPHIL # BLD AUTO: 0.01 X10(3) UL (ref 0–0.7)
EOSINOPHIL NFR BLD AUTO: 0 %
ERYTHROCYTE [DISTWIDTH] IN BLOOD BY AUTOMATED COUNT: 13.1 %
GFR SERPLBLD BASED ON 1.73 SQ M-ARVRAT: 51 ML/MIN/1.73M2 (ref 60–?)
GLUCOSE BLD-MCNC: 214 MG/DL (ref 70–99)
HCT VFR BLD AUTO: 37 %
HGB BLD-MCNC: 12.4 G/DL
IMM GRANULOCYTES # BLD AUTO: 0.47 X10(3) UL (ref 0–1)
IMM GRANULOCYTES NFR BLD: 1.6 %
LYMPHOCYTES # BLD AUTO: 1.45 X10(3) UL (ref 1–4)
LYMPHOCYTES NFR BLD AUTO: 4.9 %
MCH RBC QN AUTO: 29.6 PG (ref 26–34)
MCHC RBC AUTO-ENTMCNC: 33.5 G/DL (ref 31–37)
MCV RBC AUTO: 88.3 FL
MONOCYTES # BLD AUTO: 2 X10(3) UL (ref 0.1–1)
MONOCYTES NFR BLD AUTO: 6.8 %
NEUTROPHILS # BLD AUTO: 25.33 X10 (3) UL (ref 1.5–7.7)
NEUTROPHILS # BLD AUTO: 25.33 X10(3) UL (ref 1.5–7.7)
NEUTROPHILS NFR BLD AUTO: 86.5 %
OSMOLALITY SERPL CALC.SUM OF ELEC: 297 MOSM/KG (ref 275–295)
PLATELET # BLD AUTO: 264 10(3)UL (ref 150–450)
POTASSIUM SERPL-SCNC: 3.6 MMOL/L (ref 3.5–5.1)
POTASSIUM SERPL-SCNC: 4.1 MMOL/L (ref 3.5–5.1)
POTASSIUM SERPL-SCNC: 5.4 MMOL/L (ref 3.5–5.1)
RBC # BLD AUTO: 4.19 X10(6)UL
SARS-COV-2 RNA RESP QL NAA+PROBE: NOT DETECTED
SODIUM SERPL-SCNC: 137 MMOL/L (ref 136–145)
WBC # BLD AUTO: 29.3 X10(3) UL (ref 4–11)

## 2022-12-02 PROCEDURE — 99223 1ST HOSP IP/OBS HIGH 75: CPT | Performed by: HOSPITALIST

## 2022-12-02 RX ORDER — HYDROXYCHLOROQUINE SULFATE 200 MG/1
200 TABLET, FILM COATED ORAL DAILY
Status: DISCONTINUED | OUTPATIENT
Start: 2022-12-02 | End: 2022-12-02 | Stop reason: DRUGHIGH

## 2022-12-02 RX ORDER — HYDROXYCHLOROQUINE SULFATE 200 MG/1
400 TABLET, FILM COATED ORAL
Status: DISCONTINUED | OUTPATIENT
Start: 2022-12-02 | End: 2022-12-04

## 2022-12-02 RX ORDER — POTASSIUM CHLORIDE 20 MEQ/1
40 TABLET, EXTENDED RELEASE ORAL EVERY 4 HOURS
Status: COMPLETED | OUTPATIENT
Start: 2022-12-02 | End: 2022-12-02

## 2022-12-02 RX ORDER — AZITHROMYCIN 250 MG/1
500 TABLET, FILM COATED ORAL DAILY
Status: DISCONTINUED | OUTPATIENT
Start: 2022-12-02 | End: 2022-12-04

## 2022-12-02 RX ORDER — METHYLPREDNISOLONE SODIUM SUCCINATE 40 MG/ML
40 INJECTION, POWDER, LYOPHILIZED, FOR SOLUTION INTRAMUSCULAR; INTRAVENOUS EVERY 8 HOURS
Status: DISCONTINUED | OUTPATIENT
Start: 2022-12-02 | End: 2022-12-04

## 2022-12-02 RX ORDER — HYDROXYCHLOROQUINE SULFATE 200 MG/1
400 TABLET, FILM COATED ORAL
COMMUNITY

## 2022-12-02 RX ORDER — BENZONATATE 100 MG/1
100 CAPSULE ORAL 3 TIMES DAILY PRN
Status: DISCONTINUED | OUTPATIENT
Start: 2022-12-02 | End: 2022-12-04

## 2022-12-02 RX ORDER — HYDROXYCHLOROQUINE SULFATE 200 MG/1
200 TABLET, FILM COATED ORAL
Status: DISCONTINUED | OUTPATIENT
Start: 2022-12-03 | End: 2022-12-04

## 2022-12-02 RX ORDER — ENOXAPARIN SODIUM 100 MG/ML
40 INJECTION SUBCUTANEOUS DAILY
Status: DISCONTINUED | OUTPATIENT
Start: 2022-12-02 | End: 2022-12-04

## 2022-12-02 RX ORDER — HYDROXYCHLOROQUINE SULFATE 200 MG/1
200 TABLET, FILM COATED ORAL
COMMUNITY

## 2022-12-02 RX ORDER — METHYLPREDNISOLONE SODIUM SUCCINATE 125 MG/2ML
125 INJECTION, POWDER, LYOPHILIZED, FOR SOLUTION INTRAMUSCULAR; INTRAVENOUS ONCE
Status: COMPLETED | OUTPATIENT
Start: 2022-12-02 | End: 2022-12-02

## 2022-12-02 RX ORDER — IPRATROPIUM BROMIDE AND ALBUTEROL SULFATE 2.5; .5 MG/3ML; MG/3ML
3 SOLUTION RESPIRATORY (INHALATION) EVERY 6 HOURS
Status: DISCONTINUED | OUTPATIENT
Start: 2022-12-02 | End: 2022-12-04

## 2022-12-02 NOTE — PLAN OF CARE
Pt A&0 x 4, VSS, Pt O2 sats stable on 2L O2. Expiratory wheezes heard upon ausculation to bilateral lungs  Pain only when coughing, cough is non-productive  Ambulating without assistance  Reg diet, tolerating well. Respiratory worked with pt today regarding breathing exercises. Voids freely, Last BM on 12/1/22.     Plan- Maintain O2 90% and above on RA- Discharge home [Normal] : normoactive bowel sounds, soft and nontender, no hepatosplenomegaly or masses appreciated

## 2022-12-02 NOTE — ED QUICK NOTES
Assume pt care, report from Herbert Olguin RN. Pt resting in bed, (+) dry cough noted. States breathing not quite improved.  Pt on O2 at 2L/min via NC, SPO2 up to 96%

## 2022-12-02 NOTE — ED INITIAL ASSESSMENT (HPI)
Pt is Flu positive and her O2 sats have been dropping today as low as 86%. She has bounced between 89%-92% in triage.

## 2022-12-02 NOTE — ED QUICK NOTES
Orders for admission, patient is aware of plan and ready to go upstairs.  Any questions, please call ED RN Delvis Castro at extension [phone]    Patient Covid vaccination status: Fully vaccinated     COVID Test Ordered in ED: Rapid SARS-CoV-2 by PCR    COVID Suspicion at Admission: Low clinical suspicion for COVID    Running Infusions:  None    Mental Status/LOC at time of transport: A/Ox4    Other pertinent information:   CIWA score: N/A   NIH score:  N/A

## 2022-12-03 LAB
ANION GAP SERPL CALC-SCNC: 5 MMOL/L (ref 0–18)
BUN BLD-MCNC: 23 MG/DL (ref 7–18)
CALCIUM BLD-MCNC: 9 MG/DL (ref 8.5–10.1)
CHLORIDE SERPL-SCNC: 108 MMOL/L (ref 98–112)
CO2 SERPL-SCNC: 25 MMOL/L (ref 21–32)
CREAT BLD-MCNC: 1.04 MG/DL
ERYTHROCYTE [DISTWIDTH] IN BLOOD BY AUTOMATED COUNT: 13.2 %
GFR SERPLBLD BASED ON 1.73 SQ M-ARVRAT: 65 ML/MIN/1.73M2 (ref 60–?)
GLUCOSE BLD-MCNC: 156 MG/DL (ref 70–99)
HCT VFR BLD AUTO: 34.1 %
HGB BLD-MCNC: 11.3 G/DL
MCH RBC QN AUTO: 29.8 PG (ref 26–34)
MCHC RBC AUTO-ENTMCNC: 33.1 G/DL (ref 31–37)
MCV RBC AUTO: 90 FL
OSMOLALITY SERPL CALC.SUM OF ELEC: 293 MOSM/KG (ref 275–295)
PLATELET # BLD AUTO: 233 10(3)UL (ref 150–450)
POTASSIUM SERPL-SCNC: 4.8 MMOL/L (ref 3.5–5.1)
RBC # BLD AUTO: 3.79 X10(6)UL
SODIUM SERPL-SCNC: 138 MMOL/L (ref 136–145)
WBC # BLD AUTO: 20.2 X10(3) UL (ref 4–11)

## 2022-12-03 PROCEDURE — 99232 SBSQ HOSP IP/OBS MODERATE 35: CPT | Performed by: HOSPITALIST

## 2022-12-03 RX ORDER — GUAIFENESIN 600 MG/1
600 TABLET, EXTENDED RELEASE ORAL 2 TIMES DAILY
Status: DISCONTINUED | OUTPATIENT
Start: 2022-12-03 | End: 2022-12-04

## 2022-12-03 RX ORDER — FAMOTIDINE 20 MG/1
20 TABLET, FILM COATED ORAL 2 TIMES DAILY
Status: DISCONTINUED | OUTPATIENT
Start: 2022-12-03 | End: 2022-12-04

## 2022-12-03 RX ORDER — CALCIUM CARBONATE 200(500)MG
500 TABLET,CHEWABLE ORAL 3 TIMES DAILY PRN
Status: DISCONTINUED | OUTPATIENT
Start: 2022-12-03 | End: 2022-12-03

## 2022-12-03 RX ORDER — SODIUM CHLORIDE 9 MG/ML
INJECTION, SOLUTION INTRAVENOUS CONTINUOUS
Status: DISCONTINUED | OUTPATIENT
Start: 2022-12-03 | End: 2022-12-04

## 2022-12-03 RX ORDER — CALCIUM CARBONATE 200(500)MG
1000 TABLET,CHEWABLE ORAL 3 TIMES DAILY PRN
Status: DISCONTINUED | OUTPATIENT
Start: 2022-12-03 | End: 2022-12-04

## 2022-12-03 NOTE — PLAN OF CARE
Pt AOX4, pt On 2L via nasal cannula.  in place. Tolerating diet. Voiding up in bathroom SBA. Pt has strong non-productive cough, cough medication offered and administered. Plan: IV fluids, nebs, solumedrol. Discharge home when medically cleared.

## 2022-12-03 NOTE — PLAN OF CARE
Pt resting comfortably in bed at this time. On 2 l via nc.  in place. Tolerating diet. Voiding up in bathroom w stand by ast. Pt has strong non productive cough, refusing need for cough medication. Pt requesting tums last night, see orders & mar. Vss this time. Plan home when ready.

## 2022-12-03 NOTE — PLAN OF CARE
Message on angelo MD in regards if pt should continue Cefpodoxime, also if she can take something to loosen secretions in bronchioles.   Waiting for MD response

## 2022-12-04 VITALS
SYSTOLIC BLOOD PRESSURE: 120 MMHG | TEMPERATURE: 98 F | BODY MASS INDEX: 24 KG/M2 | HEART RATE: 84 BPM | WEIGHT: 138 LBS | RESPIRATION RATE: 18 BRPM | OXYGEN SATURATION: 90 % | DIASTOLIC BLOOD PRESSURE: 74 MMHG

## 2022-12-04 RX ORDER — IPRATROPIUM BROMIDE AND ALBUTEROL SULFATE 2.5; .5 MG/3ML; MG/3ML
3 SOLUTION RESPIRATORY (INHALATION) EVERY 6 HOURS PRN
Qty: 50 EACH | Refills: 0 | Status: SHIPPED | OUTPATIENT
Start: 2022-12-04

## 2022-12-04 RX ORDER — IPRATROPIUM BROMIDE AND ALBUTEROL SULFATE 2.5; .5 MG/3ML; MG/3ML
3 SOLUTION RESPIRATORY (INHALATION) 2 TIMES DAILY
Status: DISCONTINUED | OUTPATIENT
Start: 2022-12-04 | End: 2022-12-04

## 2022-12-04 RX ORDER — PREDNISONE 20 MG/1
TABLET ORAL
Qty: 7 TABLET | Refills: 0 | Status: SHIPPED | OUTPATIENT
Start: 2022-12-04 | End: 2022-12-14 | Stop reason: ALTCHOICE

## 2022-12-04 RX ORDER — AZITHROMYCIN 250 MG/1
250 TABLET, FILM COATED ORAL DAILY
Qty: 1 TABLET | Refills: 0 | Status: SHIPPED | OUTPATIENT
Start: 2022-12-04 | End: 2022-12-14 | Stop reason: ALTCHOICE

## 2022-12-04 RX ORDER — CODEINE PHOSPHATE AND GUAIFENESIN 10; 100 MG/5ML; MG/5ML
5 SOLUTION ORAL EVERY 4 HOURS PRN
Status: DISCONTINUED | OUTPATIENT
Start: 2022-12-04 | End: 2022-12-04

## 2022-12-04 NOTE — DISCHARGE PLANNING
IV Removed. Patient home with all personal belongings, patient voiced understanding of all discharge medications, follow up appointments, signs and symptoms to look out for, scripts filled at patient preferred pharmacy. Patient discharged home.

## 2022-12-05 ENCOUNTER — PATIENT OUTREACH (OUTPATIENT)
Dept: CASE MANAGEMENT | Age: 52
End: 2022-12-05

## 2022-12-05 DIAGNOSIS — Z02.9 ENCOUNTERS FOR UNSPECIFIED ADMINISTRATIVE PURPOSE: ICD-10-CM

## 2022-12-05 NOTE — PROGRESS NOTES
LM for pt to call [facility] for TCM since discharge. NCM phone number was provided for pt to call back. [facility] contact information was provided for pt to call back as well.

## 2022-12-05 NOTE — PROGRESS NOTES
[facility] for post hospital follow up. [facility] contact information provided as well as [facility] office number, [phone].

## 2022-12-07 NOTE — PAYOR COMM NOTE
--------------  DISCHARGE REVIEW    Payor: Juan Carlos Mohan #:  [id number]  Authorization Number: [id number]    Admit date: 12/2/22  Admit time:   7:38 AM  Discharge Date: 12/4/2022  2:30 PM     Admitting Physician: Reji Macias MD  Attending Physician:  No att. providers found  Primary Care Physician: Yesica Cruz MD

## 2022-12-08 NOTE — PAYOR COMM NOTE
--------------  DISCHARGE REVIEW    Payor: Juan Carlos Mohan #:  [id number]  Authorization Number: [id number]    Admit date: 12/2/22  Admit time:   7:38 AM  Discharge Date: 12/4/2022  2:30 PM     Admitting Physician: Winter Root MD  Attending Physician:  No att. providers found  Primary Care Physician: Yoana Alfaro 13

## 2022-12-12 ENCOUNTER — PATIENT MESSAGE (OUTPATIENT)
Dept: RHEUMATOLOGY | Facility: CLINIC | Age: 52
End: 2022-12-12

## 2022-12-12 DIAGNOSIS — M32.9 SYSTEMIC LUPUS ERYTHEMATOSUS, UNSPECIFIED SLE TYPE, UNSPECIFIED ORGAN INVOLVEMENT STATUS (HCC): ICD-10-CM

## 2022-12-12 DIAGNOSIS — R30.0 DYSURIA: Primary | ICD-10-CM

## 2022-12-14 ENCOUNTER — OFFICE VISIT (OUTPATIENT)
Dept: FAMILY MEDICINE CLINIC | Facility: CLINIC | Age: 52
End: 2022-12-14
Payer: COMMERCIAL

## 2022-12-14 VITALS
SYSTOLIC BLOOD PRESSURE: 118 MMHG | HEART RATE: 74 BPM | RESPIRATION RATE: 16 BRPM | BODY MASS INDEX: 24.24 KG/M2 | DIASTOLIC BLOOD PRESSURE: 80 MMHG | TEMPERATURE: 98 F | OXYGEN SATURATION: 98 % | HEIGHT: 64 IN | WEIGHT: 142 LBS

## 2022-12-14 DIAGNOSIS — R09.02 HYPOXIA: ICD-10-CM

## 2022-12-14 DIAGNOSIS — J45.909 MILD REACTIVE AIRWAYS DISEASE, UNSPECIFIED WHETHER PERSISTENT: Primary | ICD-10-CM

## 2022-12-14 DIAGNOSIS — J32.0 MAXILLARY SINUSITIS, UNSPECIFIED CHRONICITY: ICD-10-CM

## 2022-12-14 PROCEDURE — 3008F BODY MASS INDEX DOCD: CPT | Performed by: FAMILY MEDICINE

## 2022-12-14 PROCEDURE — 3074F SYST BP LT 130 MM HG: CPT | Performed by: FAMILY MEDICINE

## 2022-12-14 PROCEDURE — 3079F DIAST BP 80-89 MM HG: CPT | Performed by: FAMILY MEDICINE

## 2022-12-14 PROCEDURE — 99215 OFFICE O/P EST HI 40 MIN: CPT | Performed by: FAMILY MEDICINE

## 2022-12-14 RX ORDER — NEBULIZER AND COMPRESSOR
1 EACH MISCELLANEOUS AS DIRECTED
COMMUNITY
Start: 2022-11-30

## 2022-12-14 RX ORDER — CIPROFLOXACIN 250 MG/1
250 TABLET, FILM COATED ORAL 2 TIMES DAILY
Qty: 20 TABLET | Refills: 0 | Status: SHIPPED | OUTPATIENT
Start: 2022-12-14 | End: 2022-12-24

## 2022-12-14 NOTE — PATIENT INSTRUCTIONS
Thank you for choosing Guevara Alcaraz MD at [facility]  To Do: [facility] [address]. Please take meds as directed. Alonso Bateman is located in [address]. Monday, Tuesday & Friday - 8 a.m. to 4 p.m. Wednesday, Thursday - 7 a.m. to 3 p.m. The lab is closed daily from 12 p.m.-12:30 p.m. Saturday lab hours by appointment. Call [phone] to schedule the appointment. Please signup for [employer], which is electronic access to your record if you have not done so. All your results will post on there. [email or web address]   You can NOW use [employer] to book your appointments with us, or consider using open access scheduling which is through the [facility]ward website [email or web address] and type in Guevara Alcaraz MD and follow the links for \"Schedule Online Now\"    To schedule Imaging or tests at [facility] Scheduling [phone], Go to [facility] A ER Building (For example: CT scans, X rays, Ultrasound, MRI)  Cardiac Testing in ER building Building A second floor Cardiac Testing [phone] (For example: Holter Monitor, Cardiac Stress tests,Event Monitor, or 2D Echocardiograms)  [facility] call [phone] usually in [facility] A  Walk in Clinic in [place] at [facility]. [address] Mon-Fri at 8am-7:30 p.m., and Sat/Sun 9:00a. m.-4:30 p.m. Also at [employer] [facility]  Call [phone] for info     Please call our office about any questions regarding your treatment/medicines/tests as a result of today's visit. For your safety, read the entire package insert of all medicines prescribed to you and be aware of all of the risks of treatment even beyond those discussed today. All therapies have potential risk of harm or side effects or medication interactions.   It is your duty and for your safety to discuss with the pharmacist and our office with questions, and to notify us and stop treatment if problems arise, but know that our intention is that the benefits outweigh those potential risks and we strive to make you healthier and to improve your quality of life. Referrals, and Radiology Information:    If your insurance requires a referral to a specialist, please allow 5 business days to process your referral request.    If Lee Prescott MD orders a CT or MRI, it may take up to 10 business days to receive approval from your insurance company. Once our office has called informing you that the insurance company approved your testing, please call Central Scheduling at [phone]  Please allow our office 5 business days to contact you regarding any testing results. Refill policies:   Allow 3 business days for refills; controlled substances may take longer and must be picked up from the office in person. Narcotic medications can only be filled in 30 day increments and must be refilled at an office visit only. If your prescription is due for a refill, you may be due for a follow-up appointment. We cannot refill your maintenance medications at a preventative wellness visit. To best provide you care, patients receiving maintenance medications need to be seen at least twice a year.

## 2022-12-16 NOTE — PROGRESS NOTES
Multiple attempts to reach pt and messages left with no return call. Patient went in for HFU appt with PCP on 12/14/22. Encounter closing.

## 2022-12-24 LAB
ABSOLUTE BASOPHILS: 39 CELLS/UL (ref 0–200)
ABSOLUTE EOSINOPHILS: 117 CELLS/UL (ref 15–500)
ABSOLUTE LYMPHOCYTES: 1498 CELLS/UL (ref 850–3900)
ABSOLUTE MONOCYTES: 624 CELLS/UL (ref 200–950)
ABSOLUTE NEUTROPHILS: 5522 CELLS/UL (ref 1500–7800)
ALBUMIN/GLOBULIN RATIO: 1.6 (CALC) (ref 1–2.5)
ALBUMIN: 3.9 G/DL (ref 3.6–5.1)
ALKALINE PHOSPHATASE: 116 U/L (ref 37–153)
ALT: 56 U/L (ref 6–29)
APPEARANCE: CLEAR
AST: 32 U/L (ref 10–35)
BASOPHILS: 0.5 %
BILIRUBIN, TOTAL: 0.4 MG/DL (ref 0.2–1.2)
BILIRUBIN: NEGATIVE
BUN/CREATININE RATIO: 20 (CALC) (ref 6–22)
BUN: 22 MG/DL (ref 7–25)
C-REACTIVE PROTEIN: 3.3 MG/L
CALCIUM: 8.6 MG/DL (ref 8.6–10.4)
CARBON DIOXIDE: 30 MMOL/L (ref 20–32)
CHLORIDE: 103 MMOL/L (ref 98–110)
COLOR: YELLOW
COMPLEMENT COMPONENT C3C: 96 MG/DL (ref 83–193)
COMPLEMENT COMPONENT C4C: 21 MG/DL (ref 15–57)
CREATININE: 1.09 MG/DL (ref 0.5–1.03)
EGFR: 62 ML/MIN/1.73M2
EOSINOPHILS: 1.5 %
GLOBULIN: 2.4 G/DL (CALC) (ref 1.9–3.7)
GLUCOSE: 102 MG/DL (ref 65–99)
GLUCOSE: NEGATIVE
HEMATOCRIT: 38.6 % (ref 35–45)
HEMOGLOBIN: 12.8 G/DL (ref 11.7–15.5)
KETONES: NEGATIVE
LYMPHOCYTES: 19.2 %
MCH: 29.1 PG (ref 27–33)
MCHC: 33.2 G/DL (ref 32–36)
MCV: 87.7 FL (ref 80–100)
MONOCYTES: 8 %
MPV: 10.4 FL (ref 7.5–12.5)
NEUTROPHILS: 70.8 %
NITRITE: NEGATIVE
OCCULT BLOOD: NEGATIVE
PH: 5.5 (ref 5–8)
PLATELET COUNT: 154 THOUSAND/UL (ref 140–400)
POTASSIUM: 4 MMOL/L (ref 3.5–5.3)
PROTEIN, TOTAL: 6.3 G/DL (ref 6.1–8.1)
PROTEIN: NEGATIVE
RDW: 13.5 % (ref 11–15)
RED BLOOD CELL COUNT: 4.4 MILLION/UL (ref 3.8–5.1)
SED RATE BY MODIFIED$WESTERGREN: 9 MM/H
SODIUM: 138 MMOL/L (ref 135–146)
SPECIFIC GRAVITY: 1.02 (ref 1–1.03)
WHITE BLOOD CELL COUNT: 7.8 THOUSAND/UL (ref 3.8–10.8)

## 2023-01-18 ENCOUNTER — HOSPITAL ENCOUNTER (OUTPATIENT)
Dept: MAMMOGRAPHY | Age: 53
Discharge: HOME | End: 2023-01-18
Attending: SURGERY
Payer: COMMERCIAL

## 2023-01-18 DIAGNOSIS — Z12.31 SCREENING MAMMOGRAM, ENCOUNTER FOR: ICD-10-CM

## 2023-01-18 PROCEDURE — 77063 BREAST TOMOSYNTHESIS BI: CPT | Performed by: SURGERY

## 2023-01-18 PROCEDURE — 77067 SCR MAMMO BI INCL CAD: CPT | Performed by: SURGERY

## 2023-02-06 ENCOUNTER — OFFICE VISIT (OUTPATIENT)
Dept: RHEUMATOLOGY | Facility: CLINIC | Age: 53
End: 2023-02-06
Payer: COMMERCIAL

## 2023-02-06 VITALS
BODY MASS INDEX: 26 KG/M2 | WEIGHT: 149 LBS | OXYGEN SATURATION: 97 % | SYSTOLIC BLOOD PRESSURE: 118 MMHG | DIASTOLIC BLOOD PRESSURE: 72 MMHG

## 2023-02-06 DIAGNOSIS — Z86.79 HISTORY OF PERICARDITIS: ICD-10-CM

## 2023-02-06 DIAGNOSIS — M32.9 SYSTEMIC LUPUS ERYTHEMATOSUS, UNSPECIFIED SLE TYPE, UNSPECIFIED ORGAN INVOLVEMENT STATUS (HCC): Primary | ICD-10-CM

## 2023-02-06 DIAGNOSIS — Z79.52 CURRENT CHRONIC USE OF SYSTEMIC STEROIDS: ICD-10-CM

## 2023-02-06 DIAGNOSIS — R93.89 ABNORMAL CT SCAN, CHEST: ICD-10-CM

## 2023-02-06 DIAGNOSIS — Z79.899 LONG-TERM USE OF PLAQUENIL: ICD-10-CM

## 2023-02-06 DIAGNOSIS — R79.89 ABNORMAL LFTS: ICD-10-CM

## 2023-02-06 PROCEDURE — 3078F DIAST BP <80 MM HG: CPT | Performed by: INTERNAL MEDICINE

## 2023-02-06 PROCEDURE — 99214 OFFICE O/P EST MOD 30 MIN: CPT | Performed by: INTERNAL MEDICINE

## 2023-02-06 PROCEDURE — 3074F SYST BP LT 130 MM HG: CPT | Performed by: INTERNAL MEDICINE

## 2023-02-06 RX ORDER — PREDNISONE 1 MG/1
3 TABLET ORAL
COMMUNITY
End: 2023-02-06

## 2023-02-06 RX ORDER — HYDROXYCHLOROQUINE SULFATE 200 MG/1
200 TABLET, FILM COATED ORAL
Qty: 180 TABLET | Refills: 0 | Status: SHIPPED | OUTPATIENT
Start: 2023-02-06

## 2023-02-06 RX ORDER — HYDROXYCHLOROQUINE SULFATE 200 MG/1
400 TABLET, FILM COATED ORAL
Qty: 90 TABLET | Refills: 0 | Status: SHIPPED | OUTPATIENT
Start: 2023-02-07

## 2023-02-06 RX ORDER — PREDNISONE 1 MG/1
3 TABLET ORAL
Qty: 270 TABLET | Refills: 0 | Status: SHIPPED | OUTPATIENT
Start: 2023-02-06 | End: 2023-05-07

## 2023-04-01 LAB
ALBUMIN/GLOBULIN RATIO: 2.1 (CALC) (ref 1–2.5)
ALBUMIN: 4.2 G/DL (ref 3.6–5.1)
ALKALINE PHOSPHATASE: 99 U/L (ref 37–153)
ALT: 28 U/L (ref 6–29)
AST: 24 U/L (ref 10–35)
BILIRUBIN, DIRECT: 0.1 MG/DL
BILIRUBIN, INDIRECT: 0.3 MG/DL (CALC) (ref 0.2–1.2)
BILIRUBIN, TOTAL: 0.4 MG/DL (ref 0.2–1.2)
GLOBULIN: 2 G/DL (CALC) (ref 1.9–3.7)
PROTEIN, TOTAL: 6.2 G/DL (ref 6.1–8.1)

## 2023-04-28 DIAGNOSIS — M32.9 SYSTEMIC LUPUS ERYTHEMATOSUS, UNSPECIFIED SLE TYPE, UNSPECIFIED ORGAN INVOLVEMENT STATUS (HCC): ICD-10-CM

## 2023-04-28 DIAGNOSIS — Z86.79 HISTORY OF PERICARDITIS: ICD-10-CM

## 2023-04-28 RX ORDER — HYDROXYCHLOROQUINE SULFATE 200 MG/1
400 TABLET, FILM COATED ORAL
Qty: 90 TABLET | Refills: 0 | Status: SHIPPED | OUTPATIENT
Start: 2023-04-29

## 2023-04-28 RX ORDER — HYDROXYCHLOROQUINE SULFATE 200 MG/1
200 TABLET, FILM COATED ORAL
Qty: 180 TABLET | Refills: 0 | Status: SHIPPED | OUTPATIENT
Start: 2023-04-28

## 2023-04-28 NOTE — TELEPHONE ENCOUNTER
Future Appointments   Date Time Provider Kelly Hien   8/7/2023  9:00 AM Cece Champagne, DO [facility] [address]     LOV  2/6/2023    Last refills     Hydroxychloroquine 200 1 tablet  3 x per week  2/6/2023 180 tabs, 0 refills        Hydroxychloroquine 200 2 tablest 4 x per week     2/7/2023 90 tabs, 0 refills     Last labs 12/22/2022

## 2023-05-30 RX ORDER — HYDROXYCHLOROQUINE SULFATE 200 MG/1
TABLET, FILM COATED ORAL
Qty: 270 TABLET | Refills: 0 | Status: SHIPPED | OUTPATIENT
Start: 2023-05-30

## 2023-05-30 NOTE — TELEPHONE ENCOUNTER
Pt called office, pt will require a new prescription for hydroxychloroquine for 3mo with details of Sig all on ONE prescription. Pharmacy will not dispense 2 separate prescriptions for the same medication.

## 2023-06-30 RX ORDER — PREDNISONE 1 MG/1
3 TABLET ORAL
Qty: 270 TABLET | Refills: 0 | Status: SHIPPED | OUTPATIENT
Start: 2023-06-30

## 2023-06-30 RX ORDER — PREDNISONE 1 MG/1
3 TABLET ORAL
COMMUNITY
End: 2023-06-30

## 2023-08-07 ENCOUNTER — OFFICE VISIT (OUTPATIENT)
Dept: RHEUMATOLOGY | Facility: CLINIC | Age: 53
End: 2023-08-07
Payer: COMMERCIAL

## 2023-08-07 VITALS
SYSTOLIC BLOOD PRESSURE: 110 MMHG | BODY MASS INDEX: 24.79 KG/M2 | HEART RATE: 78 BPM | TEMPERATURE: 98 F | HEIGHT: 64.5 IN | WEIGHT: 147 LBS | DIASTOLIC BLOOD PRESSURE: 78 MMHG | OXYGEN SATURATION: 97 % | RESPIRATION RATE: 16 BRPM

## 2023-08-07 DIAGNOSIS — Z79.899 HIGH RISK MEDICATION USE: ICD-10-CM

## 2023-08-07 DIAGNOSIS — R79.89 ABNORMAL LFTS: ICD-10-CM

## 2023-08-07 DIAGNOSIS — Z86.79 HISTORY OF PERICARDITIS: ICD-10-CM

## 2023-08-07 DIAGNOSIS — Z13.220 SCREENING FOR HYPERCHOLESTEROLEMIA: ICD-10-CM

## 2023-08-07 DIAGNOSIS — M32.9 SYSTEMIC LUPUS ERYTHEMATOSUS, UNSPECIFIED SLE TYPE, UNSPECIFIED ORGAN INVOLVEMENT STATUS (HCC): Primary | ICD-10-CM

## 2023-08-07 DIAGNOSIS — Z79.52 CURRENT CHRONIC USE OF SYSTEMIC STEROIDS: ICD-10-CM

## 2023-08-07 DIAGNOSIS — Z79.899 LONG-TERM USE OF PLAQUENIL: ICD-10-CM

## 2023-08-07 PROCEDURE — 3078F DIAST BP <80 MM HG: CPT | Performed by: INTERNAL MEDICINE

## 2023-08-07 PROCEDURE — 3074F SYST BP LT 130 MM HG: CPT | Performed by: INTERNAL MEDICINE

## 2023-08-07 PROCEDURE — 99214 OFFICE O/P EST MOD 30 MIN: CPT | Performed by: INTERNAL MEDICINE

## 2023-08-07 PROCEDURE — 3008F BODY MASS INDEX DOCD: CPT | Performed by: INTERNAL MEDICINE

## 2023-08-07 RX ORDER — ALBUTEROL SULFATE 90 UG/1
2 AEROSOL, METERED RESPIRATORY (INHALATION)
COMMUNITY
Start: 2023-06-09

## 2023-08-24 LAB
ABSOLUTE BASOPHILS: 48 CELLS/UL (ref 0–200)
ABSOLUTE EOSINOPHILS: 88 CELLS/UL (ref 15–500)
ABSOLUTE LYMPHOCYTES: 1530 CELLS/UL (ref 850–3900)
ABSOLUTE MONOCYTES: 700 CELLS/UL (ref 200–950)
ABSOLUTE NEUTROPHILS: 4434 CELLS/UL (ref 1500–7800)
ALBUMIN/GLOBULIN RATIO: 1.8 (CALC) (ref 1–2.5)
ALBUMIN: 4 G/DL (ref 3.6–5.1)
ALKALINE PHOSPHATASE: 101 U/L (ref 37–153)
ALT: 28 U/L (ref 6–29)
APPEARANCE: CLEAR
AST: 24 U/L (ref 10–35)
BASOPHILS: 0.7 %
BILIRUBIN, TOTAL: 0.4 MG/DL (ref 0.2–1.2)
BILIRUBIN: NEGATIVE
BUN/CREATININE RATIO: 26 (CALC) (ref 6–22)
BUN: 27 MG/DL (ref 7–25)
C-REACTIVE PROTEIN: 4.3 MG/L
CALCIUM: 9.1 MG/DL (ref 8.6–10.4)
CARBON DIOXIDE: 27 MMOL/L (ref 20–32)
CHLORIDE: 103 MMOL/L (ref 98–110)
CHOL/HDLC RATIO: 3.4 (CALC)
CHOLESTEROL, TOTAL: 201 MG/DL
COLOR: YELLOW
COMPLEMENT COMPONENT C3C: 95 MG/DL (ref 83–193)
COMPLEMENT COMPONENT C4C: 22 MG/DL (ref 15–57)
CREATININE: 1.04 MG/DL (ref 0.5–1.03)
EGFR: 65 ML/MIN/1.73M2
EOSINOPHILS: 1.3 %
GLOBULIN: 2.2 G/DL (CALC) (ref 1.9–3.7)
GLUCOSE: 76 MG/DL (ref 65–99)
GLUCOSE: NEGATIVE
HDL CHOLESTEROL: 60 MG/DL
HEMATOCRIT: 40 % (ref 35–45)
HEMOGLOBIN: 13.7 G/DL (ref 11.7–15.5)
KETONES: NEGATIVE
LDL-CHOLESTEROL: 122 MG/DL (CALC)
LEUKOCYTE ESTERASE: NEGATIVE
LYMPHOCYTES: 22.5 %
MCH: 30.4 PG (ref 27–33)
MCHC: 34.3 G/DL (ref 32–36)
MCV: 88.7 FL (ref 80–100)
MONOCYTES: 10.3 %
MPV: 10.4 FL (ref 7.5–12.5)
NEUTROPHILS: 65.2 %
NITRITE: NEGATIVE
NON-HDL CHOLESTEROL: 141 MG/DL (CALC)
OCCULT BLOOD: NEGATIVE
PLATELET COUNT: 159 THOUSAND/UL (ref 140–400)
POTASSIUM: 4.2 MMOL/L (ref 3.5–5.3)
PROTEIN, TOTAL: 6.2 G/DL (ref 6.1–8.1)
PROTEIN: NEGATIVE
RDW: 12.4 % (ref 11–15)
RED BLOOD CELL COUNT: 4.51 MILLION/UL (ref 3.8–5.1)
SED RATE BY MODIFIED$WESTERGREN: 9 MM/H
SODIUM: 139 MMOL/L (ref 135–146)
SPECIFIC GRAVITY: 1.02 (ref 1–1.03)
TPMT ACTIVITY: 14 NMOL/HR/ML RBC
TRIGLYCERIDES: 88 MG/DL
WHITE BLOOD CELL COUNT: 6.8 THOUSAND/UL (ref 3.8–10.8)

## 2023-10-10 RX ORDER — PREDNISONE 1 MG/1
3 TABLET ORAL
Qty: 270 TABLET | Refills: 0 | Status: SHIPPED | OUTPATIENT
Start: 2023-10-10

## 2023-10-10 NOTE — TELEPHONE ENCOUNTER
Future Appointments   Date Time Provider Kelly Stanford   3/4/2024  9:30 AM Cece Champagne,  [facility] [address]     LOV   8/7/2023    Last refill  6/30/2023  270 tabs, 0 refills

## 2023-12-11 RX ORDER — HYDROXYCHLOROQUINE SULFATE 200 MG/1
TABLET, FILM COATED ORAL
Qty: 270 TABLET | Refills: 0 | Status: SHIPPED | OUTPATIENT
Start: 2023-12-11

## 2023-12-11 NOTE — TELEPHONE ENCOUNTER
LOV:    08/07/2023      Future Appointments   Date Time Provider Kelly Stanford   2/9/2024  9:15 AM Yumiko Chester MD [facility] SUB GI   3/4/2024  9:30 AM Cece Champagne DO [facility] [address]       LF:  05/30/2023    QTY:   270    Refills:   0    EYE EXAM:    04/26/2023

## 2024-01-29 ENCOUNTER — PATIENT MESSAGE (OUTPATIENT)
Dept: FAMILY MEDICINE CLINIC | Facility: CLINIC | Age: 54
End: 2024-01-29

## 2024-01-29 DIAGNOSIS — M32.9 SYSTEMIC LUPUS ERYTHEMATOSUS, UNSPECIFIED SLE TYPE, UNSPECIFIED ORGAN INVOLVEMENT STATUS (HCC): Primary | ICD-10-CM

## 2024-01-29 DIAGNOSIS — H93.13 TINNITUS OF BOTH EARS: Primary | ICD-10-CM

## 2024-01-29 RX ORDER — PREDNISONE 1 MG/1
3 TABLET ORAL
Qty: 270 TABLET | Refills: 0 | Status: SHIPPED | OUTPATIENT
Start: 2024-01-29

## 2024-01-29 NOTE — TELEPHONE ENCOUNTER
Future Appointments   Date Time Provider Department Center   2/9/2024  9:15 AM Stephane Vogel MD [facility] SUB GI   3/4/2024  9:30 AM Cece Champagne DO [facility] [address]     Last office visit: 8/7/2023     Last fill: 10/10/2023 270 tab, 0 refills

## 2024-01-30 NOTE — TELEPHONE ENCOUNTER
From: Brenda Castro  To: Odilon Alcaraz  Sent: 1/29/2024 11:24 AM CST  Subject: ENT    Do you have any recommendations for an ENT? I’ve been having nonstop ringing in my ears for over a year.

## 2024-02-03 ENCOUNTER — HOSPITAL ENCOUNTER (OUTPATIENT)
Dept: MAMMOGRAPHY | Age: 54
Discharge: HOME | End: 2024-02-03
Payer: COMMERCIAL

## 2024-02-03 DIAGNOSIS — Z12.31 ENCOUNTER FOR SCREENING MAMMOGRAM FOR BREAST CANCER: ICD-10-CM

## 2024-02-03 PROCEDURE — 77063 BREAST TOMOSYNTHESIS BI: CPT

## 2024-02-03 PROCEDURE — 77067 SCR MAMMO BI INCL CAD: CPT

## 2024-02-09 PROBLEM — D12.5 BENIGN NEOPLASM OF SIGMOID COLON: Status: ACTIVE | Noted: 2024-02-09

## 2024-02-09 PROBLEM — Z12.11 SPECIAL SCREENING FOR MALIGNANT NEOPLASM OF COLON: Status: ACTIVE | Noted: 2024-02-09

## 2024-02-13 ENCOUNTER — PATIENT MESSAGE (OUTPATIENT)
Dept: RHEUMATOLOGY | Facility: CLINIC | Age: 54
End: 2024-02-13

## 2024-02-13 DIAGNOSIS — Z86.79 HISTORY OF PERICARDITIS: ICD-10-CM

## 2024-02-13 DIAGNOSIS — R06.02 SHORTNESS OF BREATH: ICD-10-CM

## 2024-02-13 DIAGNOSIS — M32.9 SYSTEMIC LUPUS ERYTHEMATOSUS, UNSPECIFIED SLE TYPE, UNSPECIFIED ORGAN INVOLVEMENT STATUS (HCC): Primary | ICD-10-CM

## 2024-02-14 NOTE — TELEPHONE ENCOUNTER
From: Brenda Castro  To: Cece Champagne  Sent: 2/13/2024 10:09 PM CST  Subject: Heart Tests    Dr. Champagne,  I will you soon, but have a question/concern.  I was exercising today, for the first time in forever. I followed a beginner all over body video for 15 minutes. Of course I was very out of breathe so I took it slow and rested when needed. For the rest of the day, I felt what I would call anxious. I felt the need to take a breathe and exhale slowly, like my heart was beating faster, but not too much. I was not short of breath at all. I was first thinking this could be anxiety awaiting the results of the large polyp removed during my colonoscopy on Friday. But then it hit me, I was having the same feeling after shoveling the snow in January. I feel better now that I am getting ready for bed, just like I felt better later on that evening after the shoveling, but it lasted all day both times. Could it be just too much exercise for someone my age because I am out of shape? Could it be something else? Small fluid around my heart? Weakening heart muscle? My question for you...are you able to order an echocardiogram and or stress test or   do I have to see a cardiologist first? Lupus did affect my heart back in 2013/2014, so maybe this could be a 10 year recheck. I also had mild mitral regurgitation so maybe that could be worsening?? I will try to schedule an appointment with a cardiologist too, but it may be helpful to have some heart studies to bring with me, and in case there is a long wait to see a cardiologist, Id at least have these done already.

## 2024-02-19 LAB
ABSOLUTE BASOPHILS: 40 CELLS/UL (ref 0–200)
ABSOLUTE EOSINOPHILS: 63 CELLS/UL (ref 15–500)
ABSOLUTE LYMPHOCYTES: 1283 CELLS/UL (ref 850–3900)
ABSOLUTE MONOCYTES: 524 CELLS/UL (ref 200–950)
ABSOLUTE NEUTROPHILS: 3791 CELLS/UL (ref 1500–7800)
ALBUMIN/GLOBULIN RATIO: 1.8 (CALC) (ref 1–2.5)
ALBUMIN: 4.1 G/DL (ref 3.6–5.1)
ALKALINE PHOSPHATASE: 97 U/L (ref 37–153)
ALT: 22 U/L (ref 6–29)
ANA SCREEN, IFA: POSITIVE
AST: 22 U/L (ref 10–35)
BASOPHILS: 0.7 %
BILIRUBIN, TOTAL: 0.5 MG/DL (ref 0.2–1.2)
BUN/CREATININE RATIO: 21 (CALC) (ref 6–22)
BUN: 23 MG/DL (ref 7–25)
C-REACTIVE PROTEIN: 1.3 MG/L
CALCIUM: 9 MG/DL (ref 8.6–10.4)
CARBON DIOXIDE: 31 MMOL/L (ref 20–32)
CHLORIDE: 102 MMOL/L (ref 98–110)
COMPLEMENT COMPONENT C3C: 86 MG/DL (ref 83–193)
COMPLEMENT COMPONENT C4C: 23 MG/DL (ref 15–57)
CREATININE: 1.08 MG/DL (ref 0.5–1.03)
EGFR: 61 ML/MIN/1.73M2
EOSINOPHILS: 1.1 %
GLOBULIN: 2.3 G/DL (CALC) (ref 1.9–3.7)
GLUCOSE: 94 MG/DL (ref 65–99)
HEMATOCRIT: 40.7 % (ref 35–45)
HEMOGLOBIN: 13.8 G/DL (ref 11.7–15.5)
LYMPHOCYTES: 22.5 %
MCH: 29.6 PG (ref 27–33)
MCHC: 33.9 G/DL (ref 32–36)
MCV: 87.3 FL (ref 80–100)
MONOCYTES: 9.2 %
MPV: 10.2 FL (ref 7.5–12.5)
NEUTROPHILS: 66.5 %
PLATELET COUNT: 171 THOUSAND/UL (ref 140–400)
POTASSIUM: 4.1 MMOL/L (ref 3.5–5.3)
PROTEIN, TOTAL: 6.4 G/DL (ref 6.1–8.1)
RDW: 12.4 % (ref 11–15)
RED BLOOD CELL COUNT: 4.66 MILLION/UL (ref 3.8–5.1)
SED RATE BY MODIFIED$WESTERGREN: 6 MM/H
SODIUM: 141 MMOL/L (ref 135–146)
WHITE BLOOD CELL COUNT: 5.7 THOUSAND/UL (ref 3.8–10.8)

## 2024-02-29 ENCOUNTER — HOSPITAL ENCOUNTER (OUTPATIENT)
Dept: CV DIAGNOSTICS | Age: 54
Discharge: HOME | End: 2024-02-29
Attending: INTERNAL MEDICINE
Payer: COMMERCIAL

## 2024-02-29 DIAGNOSIS — Z86.79 HISTORY OF PERICARDITIS: ICD-10-CM

## 2024-02-29 DIAGNOSIS — R06.02 SHORTNESS OF BREATH: ICD-10-CM

## 2024-02-29 DIAGNOSIS — M32.9 SYSTEMIC LUPUS ERYTHEMATOSUS, UNSPECIFIED SLE TYPE, UNSPECIFIED ORGAN INVOLVEMENT STATUS (HCC): ICD-10-CM

## 2024-02-29 PROCEDURE — 93306 TTE W/DOPPLER COMPLETE: CPT | Performed by: INTERNAL MEDICINE

## 2024-03-04 ENCOUNTER — OFFICE VISIT (OUTPATIENT)
Dept: RHEUMATOLOGY | Facility: CLINIC | Age: 54
End: 2024-03-04
Payer: COMMERCIAL

## 2024-03-04 VITALS
HEIGHT: 64.5 IN | OXYGEN SATURATION: 97 % | RESPIRATION RATE: 16 BRPM | WEIGHT: 155.19 LBS | HEART RATE: 65 BPM | BODY MASS INDEX: 26.17 KG/M2 | SYSTOLIC BLOOD PRESSURE: 118 MMHG | DIASTOLIC BLOOD PRESSURE: 60 MMHG | TEMPERATURE: 98 F

## 2024-03-04 DIAGNOSIS — Z79.899 LONG-TERM USE OF PLAQUENIL: ICD-10-CM

## 2024-03-04 DIAGNOSIS — M32.9 SYSTEMIC LUPUS ERYTHEMATOSUS, UNSPECIFIED SLE TYPE, UNSPECIFIED ORGAN INVOLVEMENT STATUS (HCC): Primary | ICD-10-CM

## 2024-03-04 DIAGNOSIS — R79.89 HIGH SERUM CREATININE: ICD-10-CM

## 2024-03-04 DIAGNOSIS — R06.02 SHORTNESS OF BREATH: ICD-10-CM

## 2024-03-04 DIAGNOSIS — Z86.79 HISTORY OF PERICARDITIS: ICD-10-CM

## 2024-03-04 DIAGNOSIS — Z79.52 CURRENT CHRONIC USE OF SYSTEMIC STEROIDS: ICD-10-CM

## 2024-03-04 PROCEDURE — 99214 OFFICE O/P EST MOD 30 MIN: CPT | Performed by: INTERNAL MEDICINE

## 2024-03-04 RX ORDER — PREDNISONE 1 MG/1
3 TABLET ORAL
Qty: 270 TABLET | Refills: 0 | Status: SHIPPED | OUTPATIENT
Start: 2024-03-04

## 2024-03-04 RX ORDER — HYDROXYCHLOROQUINE SULFATE 200 MG/1
TABLET, FILM COATED ORAL
Qty: 270 TABLET | Refills: 0 | Status: SHIPPED | OUTPATIENT
Start: 2024-03-04

## 2024-03-04 RX ORDER — FAMOTIDINE 40 MG/1
TABLET, FILM COATED ORAL
COMMUNITY
Start: 2024-02-08

## 2024-03-04 RX ORDER — BIMATOPROST 3 UG/ML
1 SOLUTION TOPICAL NIGHTLY
COMMUNITY
Start: 2024-01-03

## 2024-03-04 NOTE — PROGRESS NOTES
RHEUMATOLOGY Follow up   Date of visit: 03/04/2024  ?  Chief Complaint   Patient presents with    Follow - Up     4M follow up. Heart racing during exercising. 0/10 pain scale.     ?  ASSESSMENT, DISCUSSION & PLAN   Assessment:  1. Systemic lupus erythematosus, unspecified SLE type, unspecified organ involvement status (HCC)    2. History of pericarditis    3. Shortness of breath    4. Long-term use of Plaquenil    5. Current chronic use of systemic steroids    6. High serum creatinine        Discussion:  Ms. Brenda Castro is a 55 yo woman with an extensive history.  It seems as if patient was initially diagnosed with CAREN/JRA when she was in her teenage years.  Was on multiple medications including sulfasalazine as well as Enbrel.  Due to the improvement of symptoms and possible remission, patient stopped all immunosuppressive therapy after her second child.  However after that time, it seems like she was diagnosed with pericarditis as well as possible pleuritis and started on steroids as well as Plaquenil.  Eventually she was started on CellCept.  She had self-discontinued CellCept due to lack of refill and then hesitation due to different colored pill with the recent refill. She did have symptoms of the start of a flare of her pericarditis/pleuritis which responded to medrol dose pack and she has been able to resume 3mg of prednisone.  She has been on some level of prednisone, most recently 3mg daily. She has not been able to taper less than that without some recurrence of chest discomfort and indigestion.  She has been tolerating Plaquenil alternating 200 and 400 mg every other day based off of her weight.  She has not had any overt chest pain or discomfort like she has had before with the current regimen.  Prior sternal pain with associated pain on respiration, it is relieved with chiropractic care.  Her most recent lupus testing was grossly negative except for her persistently/chronically low C3 levels.   She lacks any overt signs of synovitis.    At this time, we will have her continue current dose of Plaquenil as well as prednisone 3 mg daily.   She had prior borderline elevated LFTs, unclear if related to Latisse.  Updated labs was normal.  She does have some borderline renal function so we will repeat serum creatinine as well as a urinalysis in a month.  Encouraged patient to drink enough/plenty of water.  Encouraged her to also continue her annual eye exams.  She will keep me updated after workup completed through cardiology and GI.  This may be contributing to some of her symptoms.  Pulmonary function testing ordered and names provided for her to consider for pulmonology since she has not followed in years.    Previously had discussion: Given desire to try to get off prednisone, discussed potential use of azathioprine.  We discussed potentially restarting MMF, however she was never able to completely get off steroids when taking MMF.  May also consider leflunomide.    Will consider this further at her next visit.    Okay to follow-up in 6 months months or sooner as needed.   Encouraged to keep me updated on symptoms in the meantime.    Patient verbalized understanding of above instructions. No further questions at this time.    Code selection for this visit was based on time spent (30min) on date of service in preparing to see the patient, obtaining and/or reviewing separately obtained history, performing a medically appropriate examination, counseling and educating the patient/family/caregiver, ordering medications or testing, referring and communicating with other healthcare providers, documenting clinical information in the E HR, independently interpreting results and communicating results to the patient/family/caregiver and care coordination with the patient's other providers.    ?  Plan:  Diagnoses and all orders for this visit:    Systemic lupus erythematosus, unspecified SLE type, unspecified organ  involvement status (HCC)  -     hydroxychloroquine 200 MG Oral Tab; Take 1 tablet (200 mg total) by mouth 3 (three) times a week. Tue, Thu and Sat. Take 2 tablets (400 mg total) by mouth 4 (four) times a week. Mon, Wed, Fri, Sun.  -     predniSONE 1 MG Oral Tab; Take 3 tablets (3 mg total) by mouth daily with breakfast.  -     Complete PFT; Future  -     Creatinine, Serum  -     Urinalysis, Routine [E]    History of pericarditis  -     Complete PFT; Future    Shortness of breath  -     Complete PFT; Future    Long-term use of Plaquenil    Current chronic use of systemic steroids    High serum creatinine  -     Creatinine, Serum  -     Urinalysis, Routine [E]          Return in about 6 months (around 9/4/2024).  ?  HPI   Brenda Castro is a 54 year old female with the following active problems who is seen for medically necessary follow-up today. She was seen as a new patient initially  for evaluation of her lupus. She presents for follow up today.     Since her last visit, she has been doing well overall.    Had noticed 2 episodes, one with shoveling snow and other with a work out, both somewhat strenuous. Where she felt out of breath without chest pain or palpitations. Both episodes lasted for about 24 hours, then resolved on it's own. Felt different than prior pericardial effusion.   Had ECHO which was normal.  Seen by cardiology- Completed carotid UC. Wearing heart monitor. Has plans for stress ECHO    Denies any current joint pain or swelling.  Denies morning stiffness but can feel a tightness from the hips/thighs, stable from before.   Denies skin rashes  Denies significant dry eyes/dry mouth.   Denies oral or nasal ulcers   Hasn't had any recent sternal pain like she had previously     Having some irritation with eye lids so holding Latisse for now.  Previously had LFTs that were abnormal. Previously when this happened, she applied ointment which helped.   Does suffering from dry skin.     Stable brain  fog/memory issues.   Had colonoscopy with large polyp removal. Also has plans to get worked up for GERD  Increased heartburn  Increased cough- not sure if lung/reactive airway or reflux.       Has continued with plaquenil alternating between 200 and 400mg and prednisone 3mg.  Did have an eye exam, Dr. Marie, no signs of retinal toxicity (exam in April 2023).   Interested in getting off steroids     Previously:  Sulfasalazine, gold and Enbrel for tx of JRA/CAREN  After second child, was feeling well overall in 2009, so discontinued Enbrel and was off until 2013.  Labs in 2011 showed some borderline LFTs and low C3 levels     HPI from initial consultation  referred for rheumatologic evaluation due to second opinion on her lupus and lupus treatment.      At age 17, she had a lot of joint pain in knees and fingers. She was seen by rheumatology at that time, had RF negative and dx with JRA/CAREN. Was on naprosyn and sulfasalazine and gold treatment,  was on that regimen for a few years. Eventually, was started on Enbrel injections around 2000.  was on Enbrel therapy for several years until she had her first child. After delivery, restarted and with her second child in 2009, stopped and waited after delivery. States in 2011, when she moved back to the area, she set up care with another rheumatologist and as part of initial workup, was found to have low C3 levels. Had discussion of possibly starting medication for lupus but since she was feeling well, did not want to take medication.  In 2001, she had pulmonary issues with severe chest pain. CT scans were negative at that time. States symptoms went away after some time.   In 2007, when pregnant with her first child, she had developed cough and difficulty breathing in addition to lateral rib pain. Was diagnosed with pulmonary embolus as well as left leg DVT (despite no symptoms in the leg). Was placed on Lovenox for the remainder of her pregnancy. At that time,  decision was made not to continue further anticoagulation.   Does have lupus anticoagulant positivity as well as Factor V Leiden.   Does still get ddimer checked occasionally.      In 2013, she developed some indigestion which would not go away with OTC remedies. Had pain/fullness when bending forward. Then developed shortness of breath significant with ambulation and even conversational. Was in the hospital, had pericarditis and pleural effusion. States SUDHIR has been positive for several years. Only had dsDNA positive once. Other serologies were negative. Continues to have C3 low and C4 normal. At that time, also had some issues with gall bladder and elevations in LFTs.   Initially started on plaquenil as well as steroids. Over the years, when trying to taper the steroids, she would developed worsened chest pain. Decision was made to add CellCept. Was on one tablet twice daily, was having difficulty remembering to take the second dose. Since she was doing well overall, she has maintained on CellCept once daily as well as plaquenil 200mg daily. Has not been able to taper further down than 4mg prednisone.   Was also on plaquenil twice daily but again had difficulty remembering to take second pill so was discontinued.      + some right foot/big toe discomfort, feels like an superficial sensitivity to the touch. No obvious swelling or skin changes. Hx of cortisone injections in 2006/2007.  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  The patient denies oral or nasal ulcers, photosensitive rash, elevated or scarring rashes, Raynaud's phenomenon, or history of seizures.  Denies nonhealing ulcers on the fingertips or trouble swallowing.  The patient denies any history of uveitis, crampy abdominal pain, constipation, diarrhea, bloody stools, Achilles heel pain or symptoms of enthesitis, spooning or pitting of the nails, history of dactylitis.  There are no symptoms of severe dry eyes or swelling of the cheeks or under the jawbone.   No fevers, chills, lymphadenopathy, night sweats, unexpected weight loss, easy bruising or bleeding, or unexplained weakness.  Denies chronic sinus infections/disease or epistaxis.  Denies chronic cough or hemoptysis.   Denies obvious blood in urine (aside from when had kidney stones)     Family hx:  Twin sister with Raynaud's, not other formal diagnosis    ?  Past Medical History:  Past Medical History:   Diagnosis Date    SUDHIR positive 01/02/2013    Anemia, unspecified 06/29/2012    Atrophic kidney     left    Autoimmune hepatitis (HCC) 11/01/2013    resolved     Calculus of kidney     Constipation     Cystitis 03/05/2014    Esophageal reflux 06/29/2012    Esophagitis 03/05/2014    Gallbladder disease 05/30/2013    Thickening 3/2013    ILD (interstitial lung disease) (Formerly [facility]) 08/08/2013 2013 Dr Tamy Ibarra Rheum    Internal hemorrhoid     Kidney stone     left side, atrophy to kidney    Leiomyoma of uterus, unspecified 06/29/2012    Low iron 05/30/2013    Lupus (HCC) 02/06/2013    Menorrhagia 05/30/2013    Mild intermittent asthma (Former[facility]) 12/14/2016    Triggers: URI    Pain in joint, multiple sites 06/29/2012    PE (pulmonary embolism) 11/2007    Pericardial effusion  (HCC) 2013    Pericarditis (HCC) 2013    PONV (postoperative nausea and vomiting)     with c section     Primary hypercoagulable state (HCC)     lupus anticoagulant    Primary hypercoagulable state (HCC)     V leiden mutation    Psoriasis     Reactive airway disease (HCC)     Rheumatoid arthritis(714.0) 2013    20 years on enbrel- resolved remission till 9781-2129- now with lupus like features     Past Surgical History:  Past Surgical History:   Procedure Laterality Date     25-28WKS FETAL ZEINA  age 17          COLONOSCOPY      COLONOSCOPY  2013    combs    COLONOSCOPY      CYSTOSCOPY,URETEROSCOPY,LITHOTRIPSY Left 2017    L URS, laser litho, stone extraction, stent RPG, NSC    EGD      HYSTERECTOMY  2013    RTLH with BS    NEEDLE BIOPSY RIGHT Right 2016    US guided biopsy - benign **no clip was deployed**    OTHER      hsyteroscopy w insert of device to occlude fallopian tubes    OTHER  2011    Endoscopy    OTHER SURGICAL HISTORY      ESSURE     Family History:  Family History   Problem Relation Age of Onset    High Cholesterol Mother             Cancer Mother         lung    Diabetes Mother         type II    Hypertension Mother     Other (Other) Mother         Met to brain    Glaucoma Father     High Cholesterol Father             Diabetes Father         Type II    Cancer Father         prostate    Hypertension Father     Other (Other) Father     Other (peripheral vascular disease) Father     Other (stroke syndrome) Father     Breast Cancer Sister 46    Other (Other) Sister         Reynauds    No Known Problems Brother     Heart Disease Maternal Grandfather     Other (alzheimer's) Paternal Grandmother     Heart Disease Paternal Grandfather     Cancer Maternal Aunt         Pancreatic Ca    Breast Cancer Maternal Uncle     Cancer Maternal Uncle         Breast Ca    Cancer Other         No family hx Ovarian/Colon Ca     Social  History:  Social History     Socioeconomic History    Marital status:    Tobacco Use    Smoking status: Former     Years: 14     Types: Cigarettes     Quit date: 2000     Years since quittin.6    Smokeless tobacco: Never    Tobacco comments:     quit smoking    Vaping Use    Vaping Use: Never used   Substance and Sexual Activity    Alcohol use: Not Currently     Comment: 1 beer nightly    Drug use: No    Sexual activity: Yes     Partners: Male     Birth control/protection: Surgical, Hysterectomy   Other Topics Concern    Caffeine Concern No     Comment: rare    Exercise No     Medications:  Outpatient Medications Marked as Taking for the 3/4/24 encounter (Office Visit) with Cece Champagne DO   Medication Sig Dispense Refill    Bimatoprost 0.03 % External Solution 1 Application nightly.      famotidine 40 MG Oral Tab famotidine 40 MG Oral Tab, [RxNorm: 073727]      hydroxychloroquine 200 MG Oral Tab Take 1 tablet (200 mg total) by mouth 3 (three) times a week. Tue, Thu and Sat. Take 2 tablets (400 mg total) by mouth 4 (four) times a week. Mon, Wed, Fri, Sun. 270 tablet 0    predniSONE 1 MG Oral Tab Take 3 tablets (3 mg total) by mouth daily with breakfast. 270 tablet 0    albuterol 108 (90 Base) MCG/ACT Inhalation Aero Soln Inhale 2 puffs into the lungs every 4 to 6 hours. As needed      NAPROXEN 500 MG Oral Tab TAKE 1 TABLET BY MOUTH TWICE A DAY WITH MEALS 60 tablet 1     Modified Medications    Modified Medication Previous Medication    HYDROXYCHLOROQUINE 200 MG ORAL TAB hydroxychloroquine 200 MG Oral Tab       Take 1 tablet (200 mg total) by mouth 3 (three) times a week. Tue, Thu and Sat. Take 2 tablets (400 mg total) by mouth 4 (four) times a week. Mon, Wed, Fri, Sun.    Take 1 tablet (200 mg total) by mouth 3 (three) times a week. Tue, Thu and Sat. Take 2 tablets (400 mg total) by mouth 4 (four) times a week. Mon, Wed, Fri, Sun.    PREDNISONE 1 MG ORAL TAB predniSONE 1 MG Oral Tab       Take  3 tablets (3 mg total) by mouth daily with breakfast.    Take 3 tablets (3 mg total) by mouth daily with breakfast.     Medications Discontinued During This Encounter   Medication Reason    hydroxychloroquine 200 MG Oral Tab     predniSONE 1 MG Oral Tab      ?  ?  Allergies:  Allergies   Allergen Reactions    Doxycycline      Sensitivity to light      ?  REVIEW OF SYSTEMS   ?  Review of Systems   Constitutional:  Positive for malaise/fatigue. Negative for chills, fever and weight loss.   HENT:  Positive for tinnitus. Negative for congestion and nosebleeds.    Eyes:  Negative for pain and redness.   Respiratory:  Positive for cough. Negative for hemoptysis and shortness of breath.    Cardiovascular:  Negative for chest pain, palpitations and leg swelling.   Gastrointestinal:  Positive for heartburn. Negative for abdominal pain, blood in stool, diarrhea and nausea.   Genitourinary:  Positive for frequency (nocturia). Negative for dysuria and hematuria.   Musculoskeletal:  Positive for joint pain. Negative for back pain, myalgias and neck pain.   Skin:  Negative for itching and rash.   Neurological:  Positive for dizziness (orthostatic). Negative for tingling, seizures, weakness and headaches.   Endo/Heme/Allergies:  Does not bruise/bleed easily.   Psychiatric/Behavioral:  Negative for depression. The patient is not nervous/anxious and does not have insomnia.      PHYSICAL EXAM   Today's Vitals:  Temperature Blood Pressure Heart Rate Resp Rate SpO2   Temp: 97.5 °F (36.4 °C) BP: 118/60 Pulse: 65 Resp: 16 SpO2: 97 %   ?  Current Weight Height BMI BSA Pain   Wt Readings from Last 1 Encounters:   03/04/24 155 lb 3.2 oz (70.4 kg)    Height: 5' 4.5\" (163.8 cm) Body mass index is 26.23 kg/m². Body surface area is 1.77 meters squared. Pain Score: 0 - (None)       Physical Exam  Vitals and nursing note reviewed.   Constitutional:       General: She is not in acute distress.     Appearance: Normal appearance. She is  well-developed. She is not diaphoretic.   HENT:      Head: Normocephalic.   Eyes:      General: No scleral icterus.     Extraocular Movements: Extraocular movements intact.      Conjunctiva/sclera: Conjunctivae normal.   Neck:      Vascular: No JVD.      Trachea: No tracheal deviation.   Cardiovascular:      Rate and Rhythm: Normal rate and regular rhythm.      Heart sounds: Normal heart sounds. No murmur heard.  Pulmonary:      Effort: Pulmonary effort is normal. No respiratory distress.      Breath sounds: Rhonchi (scattered) present.   Musculoskeletal:         General: No swelling, tenderness or deformity.      Cervical back: Neck supple.      Comments: No evidence of heberden or jeanie nodes of any of the fingers, no basilar joint tenderness of the 1st CMC bilaterally.  No swelling, tenderness, redness or restriction of motion of the DIPs, PIPs, MCPs, wrists, elbows, ankles.  Right 1st MTP with bunion and slight bony enlargement with tenderness along inferior aspect- no synovitis   Bilateral shoulders with full ROM.  Bilateral elbows restricted in extension- right worse than left - stable   Bilateral knee tenderness without crepitus.      Lymphadenopathy:      Cervical: No cervical adenopathy.   Skin:     General: Skin is warm and dry.      Findings: No erythema or rash.      Comments: No periungal erythema  No nail pitting  No malar rash   Neurological:      Mental Status: She is alert and oriented to person, place, and time.      Cranial Nerves: No cranial nerve deficit.      Gait: Gait normal.   Psychiatric:         Mood and Affect: Mood normal.         Behavior: Behavior normal.       Radiology review:       PROCEDURE:  CT ANGIOGRAPHY, CHEST (STN=08473)       LOCATION:  [facility]         [facility] ONLY) (CPT=74160), 4/29/2022, 9:21 AM.  [facility] ONLY[facility] (CPT=71260), 4/16/2022, 12:47 PM.       INDICATIONS:  cough, difficulty breathing        TECHNIQUE:  IV contrast-enhanced multislice CT angiography is performed through the pulmonary arterial anatomy. 3D volume renderings are generated.  Dose reduction techniques were used. Dose information is transmitted to the ACR (American College of   Radiology) NRDR (National Radiology Data Registry) which includes the Dose Index Registry.       PATIENT STATED HISTORY:(As transcribed by Technologist)  Pt is complaining of a cough and [place].        CONTRAST USED:  100cc of Isovue 370       FINDINGS:     VASCULATURE:  No visible pulmonary arterial thrombus or attenuation.     THORACIC AORTA:  No aneurysm or visible dissection.     LUNGS:  Minimal atelectasis in the right middle lobe.  Peripheral reticular densities in the lungs are noted.  Minimal tree-in-bud opacities the right lower lobe are noted.   MEDIASTINUM:  No adenopathy or mass.     CORBIN:  No mass or adenopathy.     CARDIAC:  No enlargement, pericardial thickening, or significant calcification.   PLEURA:  No mass or effusion.     CHEST WALL:  No mass or axillary adenopathy.     LIMITED ABDOMEN:  Contrast versus calcification in the right kidney measures 1.2 cm.   BONES:  No bony lesion or fracture.     OTHER:  Negative.          Impression   CONCLUSION:         1. No evidence of pulmonary embolus.       2. Previously noted parenchymal abnormalities the lungs have resolved.  Minimal tree-in-bud reticular densities in the right lower lobe are noted.  This may be sequelae of prior inflammatory/atypical infectious process.       3. No consolidation.       4. High density in the right renal collecting system may be due to contrast.  Interval development of a large calcification since prior examination is considered less likely.       Agree with preliminary radiology report from [facility].                Dictated by (CST): Rj Sr MD on 11/24/2022 at 6:31 AM       Finalized by (CST): Rj Sr MD on 11/24/2022 at 6:35 AM           PROCEDURE:  CT ABDOMEN (CONTRAST ONLY) (CPT=74160)       COMPARISON:  [facility], CT, CT ABD(C/S) / PELVIS(C/S), 8/05/2011, 9:47 AM.  [facility], CT, CT CHEST(CONTRAST ONLY) (CPT=71260), 4/16/2022, 12:47 PM.       INDICATIONS:  R93.3 Abnormal CT scan, small bowel M32.9 Systemic lupus erythematosus, unspecified SLE type, unspecified organ involvement status (HCC)       TECHNIQUE:  CT scanning is performed from the dome of the diaphragm to the iliac crests with non-ionic intravenous contrast material.  Coronal MPR imaging was obtained.  Dose reduction techniques were used. Dose information is transmitted to the ACR   (American College of Radiology) NRDR (National Radiology Data Registry) which includes the Dose Index Registry.       PATIENT STATED HISTORY:(As transcribed by Technologist)  Patient states that she has no complaints, but has history of lupus and previous abnormal ct scan.        CONTRAST USED:  80cc of Omnipaque 350       FINDINGS:     LUNG BASES:  Slowly resolving inflammatory ground-glass and reticular opacities within the lung bases consistent with improving COVID-19 pneumonia.   LIVER:  No enlargement, atrophy, abnormal density, or significant focal lesion.     BILIARY:  No visible dilatation or calcification.     PANCREAS:  Tiny nonenhancing low-attenuation lesion within the uncinate process of the pancreas measuring 4 mm likely represents a tiny cyst .  This is stable since 2011 and therefore is benign.   SPLEEN:  No enlargement or focal lesion.     KIDNEYS:  The left kidney is very atrophic and dysplastic.  The right kidney is hypertrophied.  There is a small region of scarring involving the right upper pole with mildly dilated calyx in the region of scarring.   ADRENALS:  No mass or enlargement.     AORTA/VASCULAR:  No aneurysm or dissection.     RETROPERITONEUM:  No mass or adenopathy.     BOWEL/MESENTERY:  No visible mass, obstruction, or bowel wall thickening.     ABDOMINAL WALL:  No  mass or hernia.     BONES:  No bony lesion or fracture.                          Impression   CONCLUSION:         1.  Area seen on the recent CT scan corresponds to normal bowel loops of jejunum on today's scan with oral contrast.  There is no evidence of abdominal mass.         2. Slightly improving patchy airspace opacities within the lung bases consistent with COVID-19 pneumonia       3. Severely atrophic left kidney with compensatory hypertrophy of the right kidney.             Dictated by (CST): [facility] Grant MD on 4/29/2022 at 10:20 AM       Finalized by (CST): [facility] Grant MD on 4/29/2022 at 10:26 AM     PROCEDURE:  CT CHEST(CONTRAST ONLY) (CPT=71260)       COMPARISON:  [facility] , CT, CT ABDOMEN+PELVIS KIDNEYSTONE 2D RNDR(NO IV,NO ORAL)(CPT=74176), 9/09/2017, 6:17 AM.  [facility], XR, XR CHEST PA + LAT CHEST (CPT=71046), 4/11/2022, 2:34 PM.       INDICATIONS:  R93.89 Abnormal CXR R05.9 Cough R06.02 Shortness of breath M32.9 Systemic lupus erythematosus, unspecified SLE type, unspecified organ involvement status (HCC)       TECHNIQUE:  CT images were obtained with non-ionic intravenous contrast material. Dose reduction techniques were used. Dose information is transmitted to the ACR (American College of Radiology) NRDR (National Radiology Data Registry) which includes the   Dose Index Registry.       PATIENT STATED HISTORY:(As transcribed by Technologist)  Follow up on cxr 4/11/2022. SOB with cough for few days.        CONTRAST USED:  75cc of Omnipaque 350       FINDINGS:     LUNGS:  There are multiple bilateral ground-glass interstitial infiltrates scattered throughout both upper lobes, right middle lobe and both lower lobes.  There is some mild airspace disease with air bronchograms seen in the right lower lobe also.     Although nonspecific, the findings could reflect COVID-19 pneumonia in the proper clinical setting.  Other interstitial lung disease is not excluded.  The trachea and major bronchi are  patent.   VASCULATURE:  No visible pulmonary arterial thrombus or attenuation.     CORBIN:  No mass or adenopathy.     MEDIASTINUM:  No mass or adenopathy.     CARDIAC:  No enlargement, pericardial thickening, or significant calcification.   PLEURA:  No mass or effusion.     THORACIC AORTA:  No dissection or aneurysm.  There is a 4 vessel aortic arch incidentally noted.   CHEST WALL:  No mass or axillary adenopathy.     LIMITED ABDOMEN:  There is a 5.6 x 5.2 cm area of masslike opacification within the left upper quadrant just below the pancreatic body and tail.  This could reflect non-opacified loops of small bowel however a mass is not excluded and follow-up CT of the    abdomen with IV and oral contrast is recommended.   BONES:  No bony lesion or fracture.                          Impression   CONCLUSION:     1. There are numerous peripheral ground-glass interstitial infiltrates scattered throughout both lungs.  The findings are highly concerning for COVID-19 pneumonia in the proper clinical setting.  Other interstitial lung disease is not excluded.  Clinical    correlation is necessary.   2. There is a 5.6 x 5.2 cm area of soft tissue density masslike opacification in the left upper quadrant of the abdomen.  This likely reflects loops of adjacent jejunum however it is difficult to exclude with certainty a mass and follow-up CT of the   abdomen with IV and oral contrast is recommended.       The results and recommendations were discussed with the on-call physician, Dr. Baumann, at 1850 hours on 4/16/2022.           Dictated by (CST): Hunter Garcia MD on 4/16/2022 at 6:36 PM       Finalized by (CST): Hunter Garcia MD on 4/16/2022 at 6:55 PM            Labs:  Lab Results   Component Value Date    WBC 5.7 02/16/2024    RBC 4.66 02/16/2024    HGB 13.8 02/16/2024    HCT 40.7 02/16/2024     02/16/2024    MPV 9.6 01/23/2013    MCV 87.3 02/16/2024    MCH 29.6 02/16/2024    MCHC 33.9 02/16/2024    RDW 12.4  02/16/2024    NEPRELIM 25.33 (H) 12/02/2022    NEUTABS 5.3 05/21/2012    LYMPHABS 1.9 05/21/2012    NEUT 70 05/21/2012    LYMPH 25 05/21/2012    MON 4 05/21/2012    EOS 1 05/21/2012    NEPERCENT 66.5 02/16/2024    LYPERCENT 22.5 02/16/2024    MOPERCENT 9.2 02/16/2024    EOPERCENT 1.1 02/16/2024    BAPERCENT 0.7 02/16/2024    NE 3,791 02/16/2024    LYMABS 1,283 02/16/2024    MOABSO 524 02/16/2024    EOABSO 63 02/16/2024    BAABSO 40 02/16/2024     Lab Results   Component Value Date    GLU 94 02/16/2024    BUN 23 02/16/2024    BUNCREA 21 02/16/2024    CREATSERUM 1.08 (H) 02/16/2024    ANIONGAP 5 12/03/2022    GFR 82 09/09/2017    GFRNAA 68 06/28/2022    GFRAA 78 06/28/2022    CA 9.0 02/16/2024    OSMOCALC 293 12/03/2022    ALKPHO 97 02/16/2024    AST 22 02/16/2024    ALT 22 02/16/2024    BILT 0.5 02/16/2024    TP 6.4 02/16/2024    ALB 4.1 02/16/2024    GLOBULIN 2.3 02/16/2024    AGRATIO 1.8 02/16/2024     02/16/2024    K 4.1 02/16/2024     02/16/2024    CO2 31 02/16/2024       ECHO Feb 2024  Conclusions:     1. Left ventricle: The cavity size was normal. Wall thickness was normal.      Systolic function was normal. The estimated ejection fraction was 55-60%.      Left ventricular diastolic function parameters were normal.   2. No significant valve disease.   Impressions:  This study is compared with previous dated 05/20/2020: No   significant change since prior study.       Additional Labs:  02/2024  SUDHIR 1:80 nuclear, homogenous  Cr 1.08  ESR 6 normal  CRP normal   C3 86 normal   C4 23 normal       12/2022  CMP with creatinine 1.09; AST 32 normal; ALT 56 borderline elevated  ESR 9 normal  UA with trace leuk esterase otherwise negative.  Culture negative  CBC grossly normal  C3 96 normal  C4 21 normal  CRP 3.3 normal    06/2022  Vit D 46 normal  DsDNA negative  UA trace ketones; 2+leuk est 20-40 WBC no blood or protein   C3 77 low   C4 21 normal  ESR 6 normal  CRP 2.0 normal     12/7/2021  Aldolase  normal  CK normal  CMP with a ST 26; ALT 47    12/1/2021  CRP 8.4  C3 79  dsDNA negative  CBC grossly normal  ESR 11 normal  CMP with AST 45; ALT 73    10/2021  CRP 20.6  C4 27 normal  C3 95 normal  dsDNA 6 equivocal  CBC grossly normal  ESR 17  CMP with AST 25; ALT 35    02/2021  dsDNA negative  CRP 2.4 normal  C4 22 normal  C3 82 borderline low  ESR 9 normal    08/2020  AVISE- SUDHIR+ but negative by IFA; dsDNA positive but negative on confirmatory +CBCAPs; +PsPt; otherwise negative panel  IgA, IgG, IgM normal  Crp normal  C4 25 normal  C3 90 normal  Smooth muscle negative  ESR normal  Mitochondrial negative    09/2011  dsDNA 10 (N<9)  CCP negative     06/2012  dsDNA negative  RNP negative  Gaming negative  SCL 70 negative   SSA negative  SSB negative  Chromatin negative  Michell 1 negative   Centromere negative  [id number] negative  CCP negative     01/2013  Smooth muscle antibody negative  Alpha-1 antitrypsin elevated  dsDNA 257 (N<100)  histone 133 (N<100)  centromere, michell 1, SSA, SSB , RNP, smith negative   cardiolipin negative      03/2013  dsDNA negative  RNP negative  Gaming negative  SCL 70-  SSA negative  SSB negative  Chromatin negative  Michell 1 negative   Centromere negative  cardiolipin negative   yabj0qmqdjptuisjo negative     07/2013  dsDNA negative  RNP negative  Gaming negative  SCL 70-  SSA negative  SSB negative  Chromatin negative  Michell 1 negative   Centromere negative     09/2013  dsDNA negative  RNP negative  Gaming negative  SCL 70-  SSA negative  SSB negative  Chromatin negative  Michell 1 negative   Centromere negative     07/2017  dsDNA 1:160     C3  03/2013 65 low  07/2013 71 low  09/2013 71 low  02/2017 76 low   07/2017 76.6 low      C4  03/2013 15 normal  07/2013 22 normal  09/2013 22 normal  02/2017 19 normal   07/2017 20.3 normal      CRP  03/2013 82.9 very elevated   07/2013 normal   09/2013 normal  07/2017 20.3 normal   11/2019 normal      ESR  03/2013 normal   07/2013 normal   09/2013  normal  11/2019 normal       Cece Champagne DO  [facility]  03/04/2024

## 2024-05-05 DIAGNOSIS — M32.9 SYSTEMIC LUPUS ERYTHEMATOSUS, UNSPECIFIED SLE TYPE, UNSPECIFIED ORGAN INVOLVEMENT STATUS (HCC): ICD-10-CM

## 2024-05-06 RX ORDER — PREDNISONE 1 MG/1
3 TABLET ORAL
Qty: 270 TABLET | Refills: 0 | Status: SHIPPED | OUTPATIENT
Start: 2024-05-06

## 2024-05-06 NOTE — TELEPHONE ENCOUNTER
Future Appointments   Date Time Provider Department Center   5/9/2024 10:00 AM PF CARD [facility] ECHO [facility] 1 PF CARD [place]   9/30/2024 10:15 AM Cece Champagne,  [facility] [address]     Last office note: 3/4/2024    Last fill: 3/4/2024 270 tab, 0 refill

## 2024-05-09 ENCOUNTER — HOSPITAL ENCOUNTER (OUTPATIENT)
Dept: CV DIAGNOSTICS | Age: 54
Discharge: HOME | End: 2024-05-09
Attending: INTERNAL MEDICINE

## 2024-05-09 DIAGNOSIS — R00.0 RAPID HEARTBEAT: ICD-10-CM

## 2024-05-09 DIAGNOSIS — R06.09 DOE (DYSPNEA ON EXERTION): ICD-10-CM

## 2024-05-09 DIAGNOSIS — R07.9 CHEST PAIN, UNSPECIFIED TYPE: ICD-10-CM

## 2024-05-09 PROCEDURE — 93017 CV STRESS TEST TRACING ONLY: CPT | Performed by: INTERNAL MEDICINE

## 2024-05-09 PROCEDURE — 93350 STRESS TTE ONLY: CPT | Performed by: INTERNAL MEDICINE

## 2024-05-09 PROCEDURE — 93018 CV STRESS TEST I&R ONLY: CPT | Performed by: INTERNAL MEDICINE

## 2024-06-08 LAB
APPEARANCE: CLEAR
BILIRUBIN: NEGATIVE
COLOR: YELLOW
CREATININE: 0.99 MG/DL (ref 0.5–1.03)
EGFR: 68 ML/MIN/1.73M2
GLUCOSE: NEGATIVE
NITRITE: NEGATIVE
OCCULT BLOOD: NEGATIVE
PH: 5.5 (ref 5–8)
SPECIFIC GRAVITY: 1.02 (ref 1–1.03)

## 2024-06-10 ENCOUNTER — TELEPHONE (OUTPATIENT)
Dept: RHEUMATOLOGY | Facility: CLINIC | Age: 54
End: 2024-06-10

## 2024-06-11 ENCOUNTER — ORDER TRANSCRIPTION (OUTPATIENT)
Dept: ADMINISTRATIVE | Facility: HOSPITAL | Age: 54
End: 2024-06-11

## 2024-06-11 DIAGNOSIS — Z13.6 SCREENING FOR CARDIOVASCULAR CONDITION: Primary | ICD-10-CM

## 2024-07-02 ENCOUNTER — HOSPITAL ENCOUNTER (OUTPATIENT)
Dept: CT IMAGING | Age: 54
Discharge: HOME | End: 2024-07-02
Attending: INTERNAL MEDICINE

## 2024-07-02 VITALS — BODY MASS INDEX: 23.61 KG/M2 | WEIGHT: 140 LBS | HEIGHT: 64.5 IN

## 2024-07-02 DIAGNOSIS — Z13.6 SCREENING FOR CARDIOVASCULAR CONDITION: ICD-10-CM

## 2024-07-02 NOTE — PROGRESS NOTES
Date of Service 7/2/2024    SIGRID HARRIS  Date of Birth 1/8/1970    Patient Age: 54 year old    PCP: Odilon Alcaraz MD  [address]   [address] [address]    Cardiology Dr Herlinda Benton    Heart Scan Consult  Preliminary Heart Scan Score: 5.72    Previous Screening  Heart Scan Completed Previously: No        Peripheral Vascular Scan Completed Previously: No          Risk Factors  Personal Risk Factors  Non-alterable Risk Factors: Family History;Personal History  Alterable Risk Factors: Smoking;Lack of exercise      Body Mass Index  Body mass index is 23.66 kg/m².    Blood Pressure  Blood Pressure measurement declined during this encounter.  (Normal =< 120/80,  Elevated = 120-129/ >80,  High Stage1 130-139/80-89 , Stage2 >140/>90)    Lipid Profile  Not completed, patient declined testing.    Cholesterol Goals  Value   Total  =< 200   HDL  = > 45 Men = > 55 Women   LDL   =< 100   Triglycerides  =< 150       Glucose and Hemoglobin A1C  Lab Results   Component Value Date    GLU 94 02/16/2024    A1C 5.4 09/24/2015     (Normal Fasting Glucose < 100mg/dl )    Nurse Review  Risk factor information and results reviewed with Nurse: Yes    Recommended Follow Up:  Consult your physician regarding:: Final Heart Scan Report;Discuss potential for Incidental Finding;Discuss Potential for Score Variance      Recommendations for Change:  Nutrition Changes: Low Saturated Fat;Low Fat Dairy;Low Salt Eating;Increase Fiber    Cholesterol Modification (goal of therapy depends upon your risk):  (gets her cholesterol done once a year and did not want to do today just ate)    Exercise: Initiate Program    Smoking Cessation:  (quit 20 years ago)    Weight Management: Maintain Current Weight         Repeat Heart Scan: 3 Years if Calcium Score is > 0.0;Discuss with your Physician     Sarah Recommended Resources:  Recommended Resources: Upcoming Classes, Medical Services and Health Library [email or web address]     Other  Resources:: states had recent carotid Ultrasound with Dr Chetan MCMILLAN, RN        Please Contact the Nurse Heart Line with any Questions or Concerns [phone].

## 2024-07-09 ENCOUNTER — PATIENT MESSAGE (OUTPATIENT)
Dept: RHEUMATOLOGY | Facility: CLINIC | Age: 54
End: 2024-07-09

## 2024-07-09 DIAGNOSIS — M32.9 SYSTEMIC LUPUS ERYTHEMATOSUS, UNSPECIFIED SLE TYPE, UNSPECIFIED ORGAN INVOLVEMENT STATUS (HCC): ICD-10-CM

## 2024-07-09 RX ORDER — HYDROXYCHLOROQUINE SULFATE 200 MG/1
TABLET, FILM COATED ORAL
Qty: 180 TABLET | Refills: 0 | Status: SHIPPED | OUTPATIENT
Start: 2024-07-09

## 2024-07-09 NOTE — TELEPHONE ENCOUNTER
From: Brenda Castro  To: Cece Champagne  Sent: 7/9/2024 9:12 AM CDT  Subject: hydroxychloroquine- please refill     [facility] will [facility] through the site. Sometimes it takes days for Andrew to make contact with drs offices and then refill, so I’m emailing here in case.

## 2024-07-09 NOTE — TELEPHONE ENCOUNTER
LOV:  03/04/2024     Future Appointments   Date Time Provider Department Center   9/30/2024 10:15 AM Cece Champagne DO [facility] [address]       LF:  03/04/2024    QTY:   270    Refills:   0    EYE EXAM:  04/26/2023

## 2024-07-22 ENCOUNTER — TELEPHONE (OUTPATIENT)
Dept: HEMATOLOGY/ONCOLOGY | Facility: HOSPITAL | Age: 54
End: 2024-07-22

## 2024-07-22 ENCOUNTER — LAB ENCOUNTER (OUTPATIENT)
Dept: LAB | Age: 54
End: 2024-07-22
Attending: INTERNAL MEDICINE
Payer: COMMERCIAL

## 2024-07-22 DIAGNOSIS — D68.59 HYPERCOAGULABLE STATE, PRIMARY (HCC): ICD-10-CM

## 2024-07-22 LAB — D DIMER PPP FEU-MCNC: <0.27 UG/ML FEU (ref ?–0.53)

## 2024-07-22 PROCEDURE — 85379 FIBRIN DEGRADATION QUANT: CPT

## 2024-07-22 PROCEDURE — 36415 COLL VENOUS BLD VENIPUNCTURE: CPT

## 2024-07-22 NOTE — TELEPHONE ENCOUNTER
Pt is calling stating she has a standing order for a d dimer and she would like to know has it     Please give pt a call back. Thank you   Spontaneous, unlabored and symmetrical

## 2024-07-23 ENCOUNTER — OFFICE VISIT (OUTPATIENT)
Dept: FAMILY MEDICINE CLINIC | Facility: CLINIC | Age: 54
End: 2024-07-23
Payer: COMMERCIAL

## 2024-07-23 VITALS
OXYGEN SATURATION: 98 % | WEIGHT: 146 LBS | BODY MASS INDEX: 24.62 KG/M2 | SYSTOLIC BLOOD PRESSURE: 108 MMHG | HEIGHT: 64.5 IN | TEMPERATURE: 98 F | DIASTOLIC BLOOD PRESSURE: 68 MMHG | HEART RATE: 68 BPM | RESPIRATION RATE: 16 BRPM

## 2024-07-23 DIAGNOSIS — R20.2 PARESTHESIA: Primary | ICD-10-CM

## 2024-07-23 DIAGNOSIS — R20.8 SUPRAPUBIC ABDOMINAL BURNING SENSATION: ICD-10-CM

## 2024-07-23 DIAGNOSIS — E78.1 HYPERTRIGLYCERIDEMIA: ICD-10-CM

## 2024-07-23 PROCEDURE — 99214 OFFICE O/P EST MOD 30 MIN: CPT | Performed by: FAMILY MEDICINE

## 2024-07-23 RX ORDER — ROSUVASTATIN CALCIUM 5 MG
5 TABLET ORAL NIGHTLY
COMMUNITY
Start: 2024-06-13

## 2024-07-23 NOTE — PROGRESS NOTES
Subjective:   Patient ID: Brenda Castro is a 54 year old female.    HPI  Ms. Castro is a pleasant 54-year-old female with history of SLE on Methotrexate, history of DVT here today for what she describes as a burning sensation on her left lower quadrant close to her umbilicus area for about 2-3 weeks.  It tends to happen more when she presses hard on that area.  She has had this in the past which had resolved by itself.  No fever no cough no chest pain no shortness of breath no nausea no vomiting no abdominal pain no blood in the stool, degenerative changes in bladder or bowel habits.  She had a UA done as ordered by her rheumatologist which showed ketones.  She admits that she does not drink a lot of water.  Also when she has scheduled this appointment she has had leg pain but had reached out to her hematologist as she was concerned that she may have had a DVT after she had flown previously.  D-dimer was essentially negative.  Leg pain had resolved. I had reviewed past medical and family histories together with allergy and medication lists documented.        History/Other:   Review of Systems   Constitutional:  Negative for fatigue, fever and unexpected weight change.   HENT:  Negative for sore throat and trouble swallowing.    Respiratory:  Negative for cough and shortness of breath.    Cardiovascular:  Negative for chest pain and palpitations.   Gastrointestinal:  Negative for abdominal pain, diarrhea, nausea and vomiting.   Genitourinary:  Negative for difficulty urinating.   Neurological:  Negative for dizziness, weakness and headaches.     Current Outpatient Medications   Medication Sig Dispense Refill    hydroxychloroquine 200 MG Oral Tab Take 1 tablet (200 mg total) by mouth 3 (three) times a week. Tue, Thu and Sat. Take 2 tablets (400 mg total) by mouth 4 (four) times a week. Mon, Wed, Fri, Sun. 180 tablet 0    predniSONE 1 MG Oral Tab Take 3 tablets (3 mg total) by mouth daily with breakfast. 270  tablet 0    Bimatoprost 0.03 % External Solution 1 Application nightly.      albuterol 108 (90 Base) MCG/ACT Inhalation Aero Soln Inhale 2 puffs into the lungs every 4 to 6 hours. As needed      NAPROXEN 500 MG Oral Tab TAKE 1 TABLET BY MOUTH TWICE A DAY WITH MEALS 60 tablet 1    CRESTOR 5 MG Oral Tab Take 1 tablet (5 mg total) by mouth nightly. HAS NOT STARTED YET - 7/23 (Patient not taking: Reported on 7/23/2024)      famotidine 40 MG Oral Tab Take 1 tablet (40 mg total) by mouth daily. (Patient not taking: Reported on 7/23/2024) 90 tablet 0     Allergies:  Allergies   Allergen Reactions    Doxycycline      Sensitivity to light        Objective:   Physical Exam  Vitals reviewed.   Constitutional:       General: She is not in acute distress.  HENT:      Mouth/Throat:      Mouth: Mucous membranes are moist.      Pharynx: Oropharynx is clear.   Eyes:      General: No scleral icterus.     Conjunctiva/sclera: Conjunctivae normal.   Cardiovascular:      Rate and Rhythm: Normal rate and regular rhythm.      Heart sounds: Normal heart sounds. No murmur heard.  Pulmonary:      Effort: Pulmonary effort is normal. No respiratory distress.      Breath sounds: Normal breath sounds. No wheezing or rales.   Abdominal:      General: Abdomen is flat. Bowel sounds are normal. There is no distension.      Palpations: Abdomen is soft. There is no mass.      Tenderness: There is no abdominal tenderness. There is no guarding or rebound.      Hernia: No hernia is present.   Musculoskeletal:      Cervical back: Neck supple.      Right lower leg: No edema.      Left lower leg: No edema.   Lymphadenopathy:      Cervical: No cervical adenopathy.   Skin:     General: Skin is warm.   Neurological:      General: No focal deficit present.      Mental Status: She is alert.   Psychiatric:         Mood and Affect: Mood normal.         Behavior: Behavior normal.         Assessment & Plan:   1. Paresthesia   -Unclear as to etiology  - Abdominal exam  was essentially unremarkable and unrevealing  - I did recommend for her to reach out to her rheumatologist and see if this could be linked to lupus   2. Suprapubic abdominal burning sensation   -Please see #1  - I will order abdominal ultrasound  - Will check urinalysis   3. Hypertriglyceridemia   -Will check lipid panel which include CBC CMP and TSH levels     We will notify her once we get test results and provide her with recommendations as necessitated  Follow-up for next wellness exam.      This note was prepared using Dragon Medical voice recognition dictation software. As a result errors may occur. When identified these errors have been corrected. While every attempt is made to correct errors during dictation discrepancies may still exist          Orders Placed This Encounter   Procedures    CBC [6399] [Q]    COMP METABOLIC PANEL [32515] [Q]    LIPID PANEL [7600] [Q]    TSH W REFLEX TO FREE T4 [56879][Q]    Urinalysis, Routine [E]       Meds This Visit:  Requested Prescriptions      No prescriptions requested or ordered in this encounter       Imaging & Referrals:  US ABDOMEN COMPLETE (CPT=76700)

## 2024-07-23 NOTE — PATIENT INSTRUCTIONS
Thank you for choosing Odilon Alcaraz MD at [facility]  To Do: Brenda Castro  1. Please see below   Call [phone] to schedule the appointment.   Please signup for [facility]ett[facility], which is electronic access to your record if you have not done so.  All your results will post on there.  [email or web address]   You can NOW use Mettl to book your appointments with us, or consider using open access scheduling which is through the Indian website [email or web address] and type in Odilon Alcaraz MD and follow the links for \"Schedule Online Now\"    To schedule Imaging or tests at [facility] call Central Scheduling [phone], Go to [facility] ER Building (For example: CT scans, X rays, Ultrasound, MRI)  Cardiac Testing in ER building Building A second floor Cardiac Testing [phone] (For example: Holter Monitor, Cardiac Stress tests,Event Monitor, or 2D Echocardiograms)  [facility] call [phone] usually in [facility]  Walk in Clinic in [place] at [address] Mon-Fri at 8am-7:30 p.m., and Sat/Sun 9:00a.m.-4:30 p.m.  Also at [address] [place] [address]  Call [phone] for info     Please call our office about any questions regarding your treatment/medicines/tests as a result of today's visit.  For your safety, read the entire package insert of all medicines prescribed to you and be aware of all of the risks of treatment even beyond those discussed today.  All therapies have potential risk of harm or side effects or medication interactions.  It is your duty and for your safety to discuss with the pharmacist and our office with questions, and to notify us and stop treatment if problems arise, but know that our intention is that the benefits outweigh those potential risks and we strive to make you healthier and to improve your quality of life.    Referrals, and Radiology Information:    If your insurance requires a referral to a specialist, please allow 5 business days to  process your referral request.    If Odilon Alcaraz MD orders a CT or MRI, it may take up to 10 business days to receive approval from your insurance company. Once our office has called informing you that the insurance company approved your testing, please call Central Scheduling at [phone]  Please allow our office 5 business days to contact you regarding any testing results.    Refill policies:   Allow 3 business days for refills; controlled substances may take longer and must be picked up from the office in person.  Narcotic medications can only be filled in 30 day increments and must be refilled at an office visit only.  If your prescription is due for a refill, you may be due for a follow-up appointment.  We cannot refill your maintenance medications at a preventative wellness visit.  To best provide you care, patients receiving maintenance medications need to be seen at least twice a year.

## 2024-07-24 DIAGNOSIS — M32.9 SYSTEMIC LUPUS ERYTHEMATOSUS, UNSPECIFIED SLE TYPE, UNSPECIFIED ORGAN INVOLVEMENT STATUS (HCC): ICD-10-CM

## 2024-07-25 NOTE — TELEPHONE ENCOUNTER
Future Appointments   Date Time Provider Department Center   8/9/2024 11:45 AM Miri Whalen MD  [facility]   8/15/2024  8:30 AM PF [facility] PF [facility]   9/30/2024 10:15 AM Cece Champagne,  [facility] [address]     Last office visit: 3/4/2024    Last fill: 5/6/2024 270 tab, 0 refills

## 2024-07-26 RX ORDER — PREDNISONE 1 MG/1
3 TABLET ORAL
Qty: 270 TABLET | Refills: 0 | Status: SHIPPED | OUTPATIENT
Start: 2024-07-26

## 2024-07-27 LAB
ABSOLUTE BASOPHILS: 52 CELLS/UL (ref 0–200)
ABSOLUTE EOSINOPHILS: 52 CELLS/UL (ref 15–500)
ABSOLUTE LYMPHOCYTES: 1436 CELLS/UL (ref 850–3900)
ABSOLUTE MONOCYTES: 666 CELLS/UL (ref 200–950)
ABSOLUTE NEUTROPHILS: 5195 CELLS/UL (ref 1500–7800)
ALBUMIN/GLOBULIN RATIO: 1.7 (CALC) (ref 1–2.5)
ALBUMIN: 4 G/DL (ref 3.6–5.1)
ALKALINE PHOSPHATASE: 94 U/L (ref 37–153)
ALT: 22 U/L (ref 6–29)
APPEARANCE: CLEAR
AST: 22 U/L (ref 10–35)
BASOPHILS: 0.7 %
BILIRUBIN, TOTAL: 0.4 MG/DL (ref 0.2–1.2)
BILIRUBIN: NEGATIVE
BUN: 21 MG/DL (ref 7–25)
CALCIUM: 9 MG/DL (ref 8.6–10.4)
CARBON DIOXIDE: 29 MMOL/L (ref 20–32)
CHLORIDE: 102 MMOL/L (ref 98–110)
CHOL/HDLC RATIO: 3.2 (CALC)
CHOLESTEROL, TOTAL: 212 MG/DL
COLOR: YELLOW
CREATININE: 1.02 MG/DL (ref 0.5–1.03)
EGFR: 66 ML/MIN/1.73M2
EOSINOPHILS: 0.7 %
GLOBULIN: 2.3 G/DL (CALC) (ref 1.9–3.7)
GLUCOSE: 79 MG/DL (ref 65–99)
GLUCOSE: NEGATIVE
HDL CHOLESTEROL: 67 MG/DL
HEMATOCRIT: 40.6 % (ref 35–45)
HEMOGLOBIN: 13.5 G/DL (ref 11.7–15.5)
KETONES: NEGATIVE
LDL-CHOLESTEROL: 125 MG/DL (CALC)
LEUKOCYTE ESTERASE: NEGATIVE
LYMPHOCYTES: 19.4 %
MCH: 30.1 PG (ref 27–33)
MCHC: 33.3 G/DL (ref 32–36)
MCV: 90.6 FL (ref 80–100)
MONOCYTES: 9 %
MPV: 10.4 FL (ref 7.5–12.5)
NEUTROPHILS: 70.2 %
NITRITE: NEGATIVE
NON-HDL CHOLESTEROL: 145 MG/DL (CALC)
OCCULT BLOOD: NEGATIVE
PH: 7 (ref 5–8)
PLATELET COUNT: 196 THOUSAND/UL (ref 140–400)
POTASSIUM: 4.8 MMOL/L (ref 3.5–5.3)
PROTEIN, TOTAL: 6.3 G/DL (ref 6.1–8.1)
PROTEIN: NEGATIVE
RDW: 12.3 % (ref 11–15)
RED BLOOD CELL COUNT: 4.48 MILLION/UL (ref 3.8–5.1)
SODIUM: 139 MMOL/L (ref 135–146)
SPECIFIC GRAVITY: 1.01 (ref 1–1.03)
TRIGLYCERIDES: 92 MG/DL
TSH W/REFLEX TO FT4: 1.77 MIU/L
WHITE BLOOD CELL COUNT: 7.4 THOUSAND/UL (ref 3.8–10.8)

## 2024-08-09 ENCOUNTER — TELEPHONE (OUTPATIENT)
Dept: HEMATOLOGY/ONCOLOGY | Facility: HOSPITAL | Age: 54
End: 2024-08-09

## 2024-08-09 ENCOUNTER — VIRTUAL PHONE E/M (OUTPATIENT)
Dept: HEMATOLOGY/ONCOLOGY | Facility: HOSPITAL | Age: 54
End: 2024-08-09
Attending: INTERNAL MEDICINE
Payer: COMMERCIAL

## 2024-08-09 DIAGNOSIS — Z86.718 HISTORY OF THROMBOEMBOLISM OF VEIN: Primary | ICD-10-CM

## 2024-08-09 NOTE — TELEPHONE ENCOUNTER
SIGRID  [phone]  Would like to know if her phone visit could be pushed back an hour or rescheduled?  Thanks Genevieve

## 2024-08-09 NOTE — PROGRESS NOTES
Virtual Telephone Check-In    Brenda Castro verbally consents to a Virtual/Telephone Check-In visit on 08/09/24.  Patient has been referred to the [facility] website at [email or web address] to review the yearly Consent to Treat document.    Patient understands and accepts financial responsibility for any deductible, co-insurance and/or co-pays associated with this service.    Duration of the service: 30 minutes    [facility] Report of Consultation    Patient Name: Brenda Castro   YOB: 1970   Medical Record Number: [id number]   CSN: [id number]   Consulting Physician: Miri Whalen MD  Referring Physician(s):   Date of Consultation: 8/9/2024     Reason for Consultation:  [facility] for the diagnosis of history of venous thromboembolism.    History of Present Illness:     54 year old that I initially met in 2011.  History of DVT/PE at outside facility when she was pregnant.  Found to have a heterozygous factor V Leiden mutation as well as a positive lupus anticoagulant.  Diagnosed with SLE after that.  Seeing a rheumatologist.  Treated with warfarin.  In 2009 when she was pregnant, she had an episode of dyspnea and was started on warfarin preemptively without testing.  In 2010, she had a similar episode of dyspnea and received Lovenox/warfarin and remained on anticoagulation until she saw me in 2011.  After discussion, we elected to stop anticoagulation in 9/12.  Recently had pain in her leg but D-dimer was normal.    Past Medical History:  Past Medical History:    SUDHIR positive    Anemia, unspecified    Atrophic kidney    left    Autoimmune hepatitis (HCC)    resolved     Calculus of kidney    Constipation    Cystitis    Esophageal reflux    Esophagitis    Gallbladder disease    Thickening 3/2013    ILD (interstitial lung disease) (HCC)    2013 Dr Tamy Ibarra Rheum    Internal hemorrhoid    Kidney stone    left side, atrophy to  kidney    Leiomyoma of uterus, unspecified    Low iron    Lupus (HCC)    Menorrhagia    Mild intermittent asthma (HCC)    Triggers: URI    Pain in joint, multiple sites    PE (pulmonary embolism)    Pericardial effusion (HCC)    Pericarditis (HCC)    PONV (postoperative nausea and vomiting)    with c section     Primary hypercoagulable state (HCC)    lupus anticoagulant    Primary hypercoagulable state (HCC)    V leiden mutation    Psoriasis    Reactive airway disease (HCC)    Rheumatoid arthritis(714.0)    20 years on enbrel- resolved remission till 0560-9569- now with lupus like features       Past Surgical History:  Past Surgical History:   Procedure Laterality Date     25-28wks fetal sanket  age 17          Colonoscopy      Colonoscopy  2013    garret    Colonoscopy      Cystoscopy,ureteroscopy,lithotripsy Left 2017    L URS, laser litho, stone extraction, stent RPG, NSC    Egd      Hysterectomy  2013    RTLH with BS    Needle biopsy right Right 2016    US guided biopsy - benign **no clip was deployed**    Other      hsyteroscopy w insert of device to occlude fallopian tubes    Other  2011    Endoscopy    Other surgical history      ESSURE       Family Medical History:  Family History   Problem Relation Age of Onset    High Cholesterol Mother             Cancer Mother         lung    Diabetes Mother         type II    Hypertension Mother     Other (Other) Mother         Met to brain    Glaucoma Father     High Cholesterol Father             Diabetes Father         Type II    Cancer Father         prostate    Hypertension Father     Other (Other) Father     Other (peripheral vascular disease) Father     Other (stroke syndrome) Father     Breast Cancer Sister 46    Other (Other) Sister         Reynauds    No Known Problems Brother     Heart Disease Maternal Grandfather     Other (alzheimer's) Paternal Grandmother     Heart Disease Paternal Grandfather      Cancer Maternal Aunt         Pancreatic Ca    Breast Cancer Maternal Uncle     Cancer Maternal Uncle         Breast Ca    Cancer Other         No family hx Ovarian/Colon Ca       Psychosocial History:  Social History     Socioeconomic History    Marital status:      Spouse name: Not on file    Number of children: Not on file    Years of education: Not on file    Highest education level: Not on file   Occupational History    Not on file   Tobacco Use    Smoking status: Former     Current packs/day: 0.00     Types: Cigarettes     Start date: 1986     Quit date: 2000     Years since quittin.1    Smokeless tobacco: Never    Tobacco comments:     quit smoking    Vaping Use    Vaping status: Never Used   Substance and Sexual Activity    Alcohol use: Not Currently     Comment: 1 beer nightly    Drug use: No    Sexual activity: Yes     Partners: Male     Birth control/protection: Surgical, Hysterectomy   Other Topics Concern     Service Not Asked    Blood Transfusions Not Asked    Caffeine Concern No     Comment: rare    Occupational Exposure Not Asked    Hobby Hazards Not Asked    Sleep Concern Not Asked    Stress Concern Not Asked    Weight Concern Not Asked    Special Diet Not Asked    Back Care Not Asked    Exercise No    Bike Helmet Not Asked    Seat Belt Not Asked    Self-Exams Not Asked   Social History Narrative    Not on file     Social Determinants of Health     Financial Resource Strain: Not on file   Food Insecurity: Not on file   Transportation Needs: Not on file   Physical Activity: Not on file   Stress: Not on file   Social Connections: Not on file   Housing Stability: Not on file       Allergies:   Allergies   Allergen Reactions    Doxycycline      Sensitivity to light        Current Medications:    Current Outpatient Medications:     predniSONE 1 MG Oral Tab, Take 3 tablets (3 mg total) by mouth daily with breakfast., Disp: 270 tablet, Rfl: 0    CRESTOR 5 MG Oral Tab, Take  1 tablet (5 mg total) by mouth nightly. HAS NOT STARTED YET - 7/23 (Patient not taking: Reported on 7/23/2024), Disp: , Rfl:     hydroxychloroquine 200 MG Oral Tab, Take 1 tablet (200 mg total) by mouth 3 (three) times a week. Tue, Thu and Sat. Take 2 tablets (400 mg total) by mouth 4 (four) times a week. Mon, Wed, Fri, Sun., Disp: 180 tablet, Rfl: 0    famotidine 40 MG Oral Tab, Take 1 tablet (40 mg total) by mouth daily. (Patient not taking: Reported on 7/23/2024), Disp: 90 tablet, Rfl: 0    Bimatoprost 0.03 % External Solution, 1 Application nightly., Disp: , Rfl:     albuterol 108 (90 Base) MCG/ACT Inhalation Aero Soln, Inhale 2 puffs into the lungs every 4 to 6 hours. As needed, Disp: , Rfl:     NAPROXEN 500 MG Oral Tab, TAKE 1 TABLET BY MOUTH TWICE A DAY WITH MEALS, Disp: 60 tablet, Rfl: 1    Review of Systems:    Constitutional: No chills, fevers, malaise, night sweats and weight loss.   Eyes: No visual disturbance, irritation and redness.  Ears, nose, mouth, throat, and face: No hearing loss, tinnitus, hoarseness and voice change.  Respiratory: No cough, sputum, hemoptysis, chest pain, wheezing, dyspnea on exertion  Cardiovascular: No chest pain, palpitations, syncope,orthopnea, PND, edema  Gastrointestinal:No dysphagia, odynophagia, nausea, vomiting, diarrhea, abdominal pain.  Derm: No rash, skin lesions, and pruritus.  Hematologic/lymphatic: No easy bruising, bleeding, and lymphadenopathy.  Musculoskeletal: No myalgias, arthralgias, muscle weakness.  Neurological: No headaches, dizziness, seizures, speech problems, gait problems   Psych: No anxiety/depression.    Vital Signs:  LMP 05/15/2013     ECOG PS:     Physical Examination:      Labs:         Assessment and Plan:    # H/o DVT/PE: In 2007, outside facility when she was pregnant.  Treated with warfarin.  Preemptively received anticoagulation with her second and third pregnancy in 2009 and 2010.  Then remained on warfarin which we discontinued in 2012.   No VTE since then.  Positive lupus anticoagulant.  History of SLE.  Also heterozygous factor V Leiden mutation.    Discussed that she remains at risk of ongoing VTE given her history.  Baby aspirin is adequate.  Should she have another episode of VTE, warfarin would be best option given her lupus anticoagulant.  She may get D-dimer if she has symptoms per her request although discussed that D-dimer can be falsely elevated Tempus test would be an ultrasound.  May see me as needed at least once every 3 years.  Standing orders for D-dimer placed.    Sabra Whalen M.D.    [facility]    [facility]  [address] [place] [address] [place], [address]    8/9/2024    3:19 PM

## 2024-08-15 ENCOUNTER — HOSPITAL ENCOUNTER (OUTPATIENT)
Dept: ULTRASOUND IMAGING | Age: 54
Discharge: HOME | End: 2024-08-15
Attending: FAMILY MEDICINE
Payer: COMMERCIAL

## 2024-08-15 DIAGNOSIS — R20.2 PARESTHESIA: ICD-10-CM

## 2024-08-15 DIAGNOSIS — K86.89 PANCREATIC DUCT DILATED (HCC): ICD-10-CM

## 2024-08-15 DIAGNOSIS — R20.8 SUPRAPUBIC ABDOMINAL BURNING SENSATION: Primary | ICD-10-CM

## 2024-08-15 PROCEDURE — 76700 US EXAM ABDOM COMPLETE: CPT | Performed by: FAMILY MEDICINE

## 2024-08-17 ENCOUNTER — PATIENT MESSAGE (OUTPATIENT)
Dept: FAMILY MEDICINE CLINIC | Facility: CLINIC | Age: 54
End: 2024-08-17

## 2024-08-19 NOTE — TELEPHONE ENCOUNTER
From: Brenda Castro  To: dOilon [email or web address]  Sent: 8/17/2024 3:42 PM CDT  Subject: U/S Pancreas    I looked back to previous tests and came across a CT scan of abdomen from April 29, 2022. It mentions something of similar size in pancreas to what was found on the current U/S. Could they be the same thing or something new?  The report on April 29, 2022 references back to tests from 2011, showing the same sized \"cyst\", so concluded benign. I did not see anything mentioned in any reports from 2011 but I could have missed it or perhaps it wasn't on a report but the radiologist looked back at the scans.    Can you ask the radiologist that read the U/S to look back at the CT scan from April 29, 2022 and compare the two scans? And to look back to previous scans to compare?    In the meantime, I did make an appointment with GI

## 2024-08-23 PROBLEM — H93.13 BILATERAL TINNITUS: Status: ACTIVE | Noted: 2024-02-28

## 2024-08-25 PROBLEM — R93.3 ABNORMAL FINDING ON GI TRACT IMAGING: Status: ACTIVE | Noted: 2024-08-25

## 2024-09-05 NOTE — TELEPHONE ENCOUNTER
Patient discussed with GI at appt on 8/23/24. Patient is scheduled for MRI/MRCP  Future Appointments   Date Time Provider Department Center   9/29/2024  8:00 AM PF MRI [facility] (1.5T) [facility] [place]   9/30/2024 10:15 AM Cece Champagne, DO [id number] [address]

## 2024-09-29 ENCOUNTER — HOSPITAL ENCOUNTER (OUTPATIENT)
Dept: MRI IMAGING | Age: 54
End: 2024-09-29
Attending: INTERNAL MEDICINE
Payer: COMMERCIAL

## 2024-09-29 ENCOUNTER — HOSPITAL ENCOUNTER (OUTPATIENT)
Dept: MRI IMAGING | Age: 54
Discharge: HOME | End: 2024-09-29
Attending: INTERNAL MEDICINE
Payer: COMMERCIAL

## 2024-09-29 DIAGNOSIS — R93.3 ABNORMAL FINDING ON GI TRACT IMAGING: ICD-10-CM

## 2024-09-29 PROCEDURE — 76376 3D RENDER W/INTRP POSTPROCES: CPT | Performed by: INTERNAL MEDICINE

## 2024-09-29 PROCEDURE — A9575 INJ GADOTERATE MEGLUMI 0.1ML: HCPCS

## 2024-09-29 PROCEDURE — 74183 MRI ABD W/O CNTR FLWD CNTR: CPT | Performed by: INTERNAL MEDICINE

## 2024-09-29 RX ORDER — GADOTERATE MEGLUMINE 376.9 MG/ML
15 INJECTION INTRAVENOUS
Status: COMPLETED | OUTPATIENT
Start: 2024-09-29 | End: 2024-09-29

## 2024-09-29 RX ADMIN — GADOTERATE MEGLUMINE 15 ML: 376.9 INJECTION INTRAVENOUS at 09:12:00

## 2024-09-30 ENCOUNTER — OFFICE VISIT (OUTPATIENT)
Dept: RHEUMATOLOGY | Facility: CLINIC | Age: 54
End: 2024-09-30
Payer: COMMERCIAL

## 2024-09-30 VITALS
TEMPERATURE: 98 F | DIASTOLIC BLOOD PRESSURE: 64 MMHG | WEIGHT: 151 LBS | SYSTOLIC BLOOD PRESSURE: 108 MMHG | OXYGEN SATURATION: 98 % | HEART RATE: 92 BPM | BODY MASS INDEX: 25.46 KG/M2 | RESPIRATION RATE: 16 BRPM | HEIGHT: 64.5 IN

## 2024-09-30 DIAGNOSIS — Z86.79 HISTORY OF PERICARDITIS: ICD-10-CM

## 2024-09-30 DIAGNOSIS — R06.02 SHORTNESS OF BREATH: ICD-10-CM

## 2024-09-30 DIAGNOSIS — Z79.899 LONG-TERM USE OF PLAQUENIL: ICD-10-CM

## 2024-09-30 DIAGNOSIS — Z79.52 CURRENT CHRONIC USE OF SYSTEMIC STEROIDS: ICD-10-CM

## 2024-09-30 DIAGNOSIS — M32.9 SYSTEMIC LUPUS ERYTHEMATOSUS, UNSPECIFIED SLE TYPE, UNSPECIFIED ORGAN INVOLVEMENT STATUS (HCC): Primary | ICD-10-CM

## 2024-09-30 PROCEDURE — 99214 OFFICE O/P EST MOD 30 MIN: CPT | Performed by: INTERNAL MEDICINE

## 2024-09-30 RX ORDER — HYDROXYCHLOROQUINE SULFATE 200 MG/1
TABLET, FILM COATED ORAL
Qty: 180 TABLET | Refills: 1 | Status: SHIPPED | OUTPATIENT
Start: 2024-09-30

## 2024-09-30 RX ORDER — PREDNISONE 1 MG/1
3 TABLET ORAL
Qty: 270 TABLET | Refills: 1 | Status: SHIPPED | OUTPATIENT
Start: 2024-09-30

## 2024-09-30 NOTE — PROGRESS NOTES
RHEUMATOLOGY Follow up   Date of visit: 09/30/2024  ?  Chief Complaint   Patient presents with    SLE     7 month f/u. Feeling pretty good. No real joint pain or swelling. No rashes. Burning/sensitivity of skin has gone away. Converted rapid score of 0.7     ?  ASSESSMENT, DISCUSSION & PLAN   Assessment:  1. Systemic lupus erythematosus, unspecified SLE type, unspecified organ involvement status (HCC)    2. History of pericarditis    3. Shortness of breath    4. Long-term use of Plaquenil    5. Current chronic use of systemic steroids          Discussion:  Ms. Brenda Castro is a 53 yo woman with an extensive history.  It seems as if patient was initially diagnosed with CAREN/JRA when she was in her teenage years.  Was on multiple medications including sulfasalazine as well as Enbrel.  Due to the improvement of symptoms and possible remission, patient stopped all immunosuppressive therapy after her second child.  However after that time, it seems like she was diagnosed with pericarditis as well as possible pleuritis and started on steroids as well as Plaquenil.  Eventually she was started on CellCept.  She had self-discontinued CellCept due to lack of refill and then hesitation due to different colored pill with the recent refill. She did have symptoms of the start of a flare of her pericarditis/pleuritis which responded to medrol dose pack and she has been able to resume 3mg of prednisone.  She has been on some level of prednisone, most recently 3mg daily. She has not been able to taper less than that without some recurrence of chest discomfort and indigestion.  She has been tolerating Plaquenil alternating 200 and 400 mg every other day based off of her weight.  She has not had any overt chest pain or discomfort like she has had before with the current regimen.  Prior sternal pain with associated pain on respiration, it is relieved with chiropractic care.  Her most recent lupus testing was grossly negative  except for her persistently/chronically low C3 levels.  She lacks any overt signs of synovitis.    At this time, we will have her continue current dose of Plaquenil. Depending on labs, will try to decrease prednisone to 2mg. Previously, when decreasing from 4mg to 3mg, she had a slight flare and after higher steroid taper, was able to stay on the 3mg.   She had prior borderline elevated LFTs, unclear if related to Latisse.  Updated labs was normal.   She now has irritation around the eyelids- recommended she follow back with ophthalmology since she is due for an update eye exam to monitor for plaquenil toxicity.   Reminded to complete work up including PFTs.   Previously had discussion: Given desire to try to get off prednisone, discussed potential use of azathioprine.  We discussed potentially restarting MMF, however she was never able to completely get off steroids when taking MMF.  May also consider leflunomide.    Will consider this further at her next visit depending on how she does with trying to decrease prednisone.     Okay to follow-up in 6 months months or sooner as needed.   Encouraged to keep me updated on symptoms in the meantime.    Patient verbalized understanding of above instructions. No further questions at this time.    Code selection for this visit was based on time spent (30min) on date of service in preparing to see the patient, obtaining and/or reviewing separately obtained history, performing a medically appropriate examination, counseling and educating the patient/family/caregiver, ordering medications or testing, referring and communicating with other healthcare providers, documenting clinical information in the E HR, independently interpreting results and communicating results to the patient/family/caregiver and care coordination with the patient's other providers.    ?  Plan:  Diagnoses and all orders for this visit:    Systemic lupus erythematosus, unspecified SLE type, unspecified organ  involvement status (HCC)  -     C-Reactive Protein  -     Sed Rate, Westergren (Automated)  -     Complement C3, Serum  -     Complement C4, Serum  -     Immunoglobulin A/G/M, Quant  -     hydroxychloroquine 200 MG Oral Tab; Take 1 tablet (200 mg total) by mouth 3 (three) times a week. Tue, Thu and Sat. Take 2 tablets (400 mg total) by mouth 4 (four) times a week. Mon, Wed, Fri, Sun.  -     predniSONE 1 MG Oral Tab; Take 3 tablets (3 mg total) by mouth daily with breakfast.    History of pericarditis    Shortness of breath    Long-term use of Plaquenil  -     C-Reactive Protein  -     Sed Rate, Westergren (Automated)  -     Complement C3, Serum  -     Complement C4, Serum  -     Immunoglobulin A/G/M, Quant    Current chronic use of systemic steroids            Return in about 6 months (around 3/30/2025).  ?  HPI   Brenda Castro is a 54 year old female with the following active problems who is seen for medically necessary follow-up today. She was seen as a new patient initially  for evaluation of her lupus. She presents for follow up today.     Since her last visit, she has been doing well overall.    Has some increased eye redness and pain since Friday. Has to make appt with ophthalmology   Was previously using Latisse but not as often. Previously had LFTs that were abnormal  Is getting work done in her kitchen.     Had MRI through GI and has pancreatic cyst. Has to follow up to discuss.  Appetite still good  Denies unexpected weight loss  Stable fatigue   Denies n/v/d/c or jessy colored stools.   + increased bloating    Cardiac workup negative. Recommended crestor- has not started yet. No more episodes of chest pain with sob like in the winter. Following with cardiology.    Some joint pain-in the left knee has since resolved. No other swelling.   Denies any current joint pain.  + neck pain but attributes to positioning when sleeping     Denies morning stiffness but can feel a tightness from the hips/thighs,  stable from before.   Denies skin rashes but stable dry skin   Denies significant dry eyes/dry mouth.   Denies oral or nasal ulcers     Stable brain fog/memory issues.   Had colonoscopy with large polyp removal. Did not have EGD due to insurance requirements. Prior GI retired and now est care with new GI.   Increased cough- not sure if lung/reactive airway or reflux. Did have a cold and not sure if dust related   Feels like takes longer to recover from infections. Feels like inhaler helps to get cough more productive   Hasn't gotten updated PFT    Has continued with plaquenil alternating between 200 and 400mg and prednisone 3mg.  Did have an eye exam, Dr. Marie, no signs of retinal toxicity (exam in April 2023).   Interested in getting off steroids     Previously:  Sulfasalazine, gold and Enbrel for tx of JRA/CAREN  After second child, was feeling well overall in 2009, so discontinued Enbrel and was off until 2013.  Labs in 2011 showed some borderline LFTs and low C3 levels     HPI from initial consultation  referred for rheumatologic evaluation due to second opinion on her lupus and lupus treatment.      At age 17, she had a lot of joint pain in knees and fingers. She was seen by rheumatology at that time, had RF negative and dx with JRA/CAREN. Was on naprosyn and sulfasalazine and gold treatment,  was on that regimen for a few years. Eventually, was started on Enbrel injections around 2000.  was on Enbrel therapy for several years until she had her first child. After delivery, restarted and with her second child in 2009, stopped and waited after delivery. States in 2011, when she moved back to the area, she set up care with another rheumatologist and as part of initial workup, was found to have low C3 levels. Had discussion of possibly starting medication for lupus but since she was feeling well, did not want to take medication.  In 2001, she had pulmonary issues with severe chest pain. CT scans were  negative at that time. States symptoms went away after some time.   In 2007, when pregnant with her first child, she had developed cough and difficulty breathing in addition to lateral rib pain. Was diagnosed with pulmonary embolus as well as left leg DVT (despite no symptoms in the leg). Was placed on Lovenox for the remainder of her pregnancy. At that time, decision was made not to continue further anticoagulation.   Does have lupus anticoagulant positivity as well as Factor V Leiden.   Does still get ddimer checked occasionally.      In 2013, she developed some indigestion which would not go away with OTC remedies. Had pain/fullness when bending forward. Then developed shortness of breath significant with ambulation and even conversational. Was in the hospital, had pericarditis and pleural effusion. States SUDHIR has been positive for several years. Only had dsDNA positive once. Other serologies were negative. Continues to have C3 low and C4 normal. At that time, also had some issues with gall bladder and elevations in LFTs.   Initially started on plaquenil as well as steroids. Over the years, when trying to taper the steroids, she would developed worsened chest pain. Decision was made to add CellCept. Was on one tablet twice daily, was having difficulty remembering to take the second dose. Since she was doing well overall, she has maintained on CellCept once daily as well as plaquenil 200mg daily. Has not been able to taper further down than 4mg prednisone.   Was also on plaquenil twice daily but again had difficulty remembering to take second pill so was discontinued.      + some right foot/big toe discomfort, feels like an superficial sensitivity to the touch. No obvious swelling or skin changes. Hx of cortisone injections in 2006/2007.  + right rib/pec muscle pain that radiates to the right shoulder   + hair thinning, unclear if due to age/stress   + hx of low platelets, improved after ?hysterectomy, no issues  recently   + hx of left renal atrophy (initially found in 2013), thought to be born that way; hx of left renal stones   + hx of endoscopy prior to lupus dx and told she should take OTC medications; does have famotidine but pt has not restarted   + hx of significant dry mouth but only lasted for about 1-2 weeks, no recurrence since that time  + hx of some psoriasis over her palms while on the Enbrel; still has some dry patches elbows         No history of significant morning stiffness, wrist pain or swelling, pain or swelling of the MCPs, pain or swelling of the ankles, or new skin nodule formation.  The patient denies oral or nasal ulcers, photosensitive rash, elevated or scarring rashes, Raynaud's phenomenon, or history of seizures.  Denies nonhealing ulcers on the fingertips or trouble swallowing.  The patient denies any history of uveitis, crampy abdominal pain, constipation, diarrhea, bloody stools, Achilles heel pain or symptoms of enthesitis, spooning or pitting of the nails, history of dactylitis.  There are no symptoms of severe dry eyes or swelling of the cheeks or under the jawbone.   No fevers, chills, lymphadenopathy, night sweats, unexpected weight loss, easy bruising or bleeding, or unexplained weakness.  Denies chronic sinus infections/disease or epistaxis.  Denies chronic cough or hemoptysis.   Denies obvious blood in urine (aside from when had kidney stones)     Family hx:  Twin sister with Raynaud's, not other formal diagnosis    ?  Past Medical History:  Past Medical History:    SUDHIR positive    Anemia, unspecified    Atrophic kidney    left    Autoimmune hepatitis (HCC)    resolved     Back pain    Belching    Bloating    Calculus of kidney    Constipation    Cystitis    Esophageal reflux    Esophagitis    Flatulence/gas pain/belching    Gallbladder disease    Thickening 3/2013    Hearing loss    Heartburn    Hemorrhoids    ILD (interstitial lung disease) (HCC)    2013 Dr Tamy Ibarra [facility]     Internal hemorrhoid    Kidney stone    left side, atrophy to kidney    Leiomyoma of uterus, unspecified    Low iron    Lupus (HCC)    Menorrhagia    Mild intermittent asthma (HCC)    Triggers: URI    Night sweats    Menopause    Pain in joint, multiple sites    PE (pulmonary embolism)    Pericardial effusion (HCC)    Pericarditis (HCC)    PONV (postoperative nausea and vomiting)    with c section     Primary hypercoagulable state (HCC)    lupus anticoagulant    Primary hypercoagulable state (HCC)    V leiden mutation    Psoriasis    Reactive airway disease (HCC)    Rheumatoid arthritis(714.0)    20 years on enbrel- resolved remission till [id number]- now with lupus like features    Shortness of breath    Wears glasses    Weight gain     Past Surgical History:  Past Surgical History:   Procedure Laterality Date     25-28wks fetal sanket  age 17          Colonoscopy      Colonoscopy  2013    combs    Colonoscopy      Cystoscopy,ureteroscopy,lithotripsy Left 2017    L URS, laser litho, stone extraction, stent RPG, NSC    Egd      Hysterectomy  2013    RTLH with BS    Needle biopsy right Right 2016    US guided biopsy - benign **no clip was deployed**    Other      hsyteroscopy w insert of device to occlude fallopian tubes    Other  2011    Endoscopy    Other surgical history      ESSURE    Total abdom hysterectomy       Family History:  Family History   Problem Relation Age of Onset    High Cholesterol Mother             Cancer Mother         lung    Diabetes Mother         type II    Hypertension Mother     Other (Other) Mother         Met to brain    Glaucoma Father     High Cholesterol Father             Diabetes Father         Type II    Cancer Father         prostate    Hypertension Father     Other (Other) Father     Other (peripheral vascular disease) Father     Other (stroke syndrome) Father     Prostate Cancer Father     Stroke Father     Breast  Cancer Sister 46    Other (Other) Sister         Sherri    No Known Problems Brother     Heart Disease Maternal Grandfather     Other (alzheimer's) Paternal Grandmother     Heart Disease Paternal Grandfather     Cancer Maternal Aunt         Pancreatic Ca    Breast Cancer Maternal Uncle     Cancer Maternal Uncle         Breast Ca    Cancer Other         No family hx Ovarian/Colon Ca     Social History:  Social History     Socioeconomic History    Marital status:    Tobacco Use    Smoking status: Former     Current packs/day: 0.00     Average packs/day: 1.5 packs/day for 14.0 years (21.0 ttl pk-yrs)     Types: Cigarettes     Start date: 1986     Quit date: 2000     Years since quittin.2    Smokeless tobacco: Never    Tobacco comments:     quit smoking    Vaping Use    Vaping status: Never Used   Substance and Sexual Activity    Alcohol use: Yes     Alcohol/week: 7.0 standard drinks of alcohol     Types: 7 Cans of beer per week     Comment: 1 beer nightly    Drug use: No    Sexual activity: Yes     Partners: Male     Birth control/protection: Surgical, Hysterectomy   Other Topics Concern    Caffeine Concern No     Comment: rare    Exercise No     Medications:  Outpatient Medications Marked as Taking for the 24 encounter (Office Visit) with Cece Champagne DO   Medication Sig Dispense Refill    hydroxychloroquine 200 MG Oral Tab Take 1 tablet (200 mg total) by mouth 3 (three) times a week. Tue, Thu and Sat. Take 2 tablets (400 mg total) by mouth 4 (four) times a week. Mon, Wed, Fri, Sun. 180 tablet 1    predniSONE 1 MG Oral Tab Take 3 tablets (3 mg total) by mouth daily with breakfast. 270 tablet 1    Bimatoprost 0.03 % External Solution 1 Application nightly.      albuterol 108 (90 Base) MCG/ACT Inhalation Aero Soln Inhale 2 puffs into the lungs every 4 to 6 hours. As needed      NAPROXEN 500 MG Oral Tab TAKE 1 TABLET BY MOUTH TWICE A DAY WITH MEALS 60 tablet 1     Modified Medications     Modified Medication Previous Medication    HYDROXYCHLOROQUINE 200 MG ORAL TAB hydroxychloroquine 200 MG Oral Tab       Take 1 tablet (200 mg total) by mouth 3 (three) times a week. Tue, Thu and Sat. Take 2 tablets (400 mg total) by mouth 4 (four) times a week. Mon, Wed, Fri, Sun.    Take 1 tablet (200 mg total) by mouth 3 (three) times a week. Tue, Thu and Sat. Take 2 tablets (400 mg total) by mouth 4 (four) times a week. Mon, Wed, Fri, Sun.    PREDNISONE 1 MG ORAL TAB predniSONE 1 MG Oral Tab       Take 3 tablets (3 mg total) by mouth daily with breakfast.    Take 3 tablets (3 mg total) by mouth daily with breakfast.     Medications Discontinued During This Encounter   Medication Reason    hydroxychloroquine 200 MG Oral Tab     predniSONE 1 MG Oral Tab        ?  ?  Allergies:  Allergies   Allergen Reactions    Doxycycline      Sensitivity to light      ?  REVIEW OF SYSTEMS   ?  Review of Systems   Constitutional:  Positive for malaise/fatigue. Negative for chills, fever and weight loss.   HENT:  Positive for tinnitus. Negative for congestion and nosebleeds.    Eyes:  Positive for redness. Negative for pain.   Respiratory:  Positive for cough. Negative for hemoptysis and shortness of breath.    Cardiovascular:  Negative for chest pain, palpitations and leg swelling.   Gastrointestinal:  Positive for heartburn. Negative for abdominal pain, blood in stool, constipation, diarrhea and nausea.   Genitourinary:  Positive for frequency (nocturia). Negative for dysuria and hematuria.   Musculoskeletal:  Positive for neck pain. Negative for back pain, joint pain and myalgias.   Skin:  Negative for itching and rash.   Neurological:  Negative for dizziness, tingling, weakness and headaches.   Endo/Heme/Allergies:  Does not bruise/bleed easily.     PHYSICAL EXAM   Today's Vitals:  Temperature Blood Pressure Heart Rate Resp Rate SpO2   Temp: 97.9 °F (36.6 °C) BP: 108/64 Pulse: 92 Resp: 16 SpO2: 98 %   ?  Current Weight Height  BMI BSA Pain   Wt Readings from Last 1 Encounters:   09/30/24 151 lb (68.5 kg)    Height: 5' 4.5\" (163.8 cm) Body mass index is 25.52 kg/m². Body surface area is 1.75 meters squared.         Physical Exam  Vitals and nursing note reviewed.   Constitutional:       General: She is not in acute distress.     Appearance: Normal appearance. She is well-developed. She is not diaphoretic.   HENT:      Head: Normocephalic.   Eyes:      General: No scleral icterus.     Extraocular Movements: Extraocular movements intact.      Conjunctiva/sclera: Conjunctivae normal.   Neck:      Vascular: No JVD.      Trachea: No tracheal deviation.   Cardiovascular:      Rate and Rhythm: Normal rate and regular rhythm.      Heart sounds: Normal heart sounds. No murmur heard.  Pulmonary:      Effort: Pulmonary effort is normal. No respiratory distress.      Breath sounds: Wheezing present. No rhonchi.   Musculoskeletal:         General: No swelling, tenderness or deformity.      Cervical back: Neck supple.      Comments: No evidence of heberden or jeanie nodes of any of the fingers, no basilar joint tenderness of the 1st CMC bilaterally.  No swelling, tenderness, redness or restriction of motion of the DIPs, PIPs, MCPs, wrists, elbows, ankles.  Right 1st MTP with bunion and slight bony enlargement with tenderness along inferior aspect- no synovitis   Bilateral shoulders with full ROM.  Bilateral elbows restricted in extension- right worse than left - stable   Bilateral knee tenderness without crepitus.      Lymphadenopathy:      Cervical: No cervical adenopathy.   Skin:     General: Skin is warm and dry.      Findings: No erythema or rash.      Comments: No periungal erythema  No nail pitting  No malar rash   Neurological:      Mental Status: She is alert and oriented to person, place, and time.      Cranial Nerves: No cranial nerve deficit.      Gait: Gait normal.   Psychiatric:         Mood and Affect: Mood normal.         Behavior:  Behavior normal.       Radiology review:  MRI ABDOMEN AND MRCP W/3D (W+W0)(CPT=74183/59653)    Result Date: 9/29/2024  CONCLUSION:   1. There is no significant dilatation of the pancreatic duct on today's exam.  There is a nonenhancing T2 hyperintense lesion along the pancreatic head/uncinate process measuring up to 6 x 5 x 5 mm without significant concerning imaging characteristics that most likely represents a pancreatic cyst, with cystic neoplasm not excluded.  Clinical correlation recommended.  Follow-up MRI of the abdomen in 6 months with and without contrast is suggested for surveillance.  No significant pancreatic ductal dilatation identified.  2. Atrophic, dysplastic, and scarred left kidney again noted, not significantly changed from the previous CT.   3. There is compensatory hypertrophy of the right kidney noted.  Mild scattered cortical scarring in the right kidney, most pronounced along the upper pole.  4 mm cyst in the mid right kidney.  Please see above for further details.  LOCATION:  [id number]    Dictated by (CST): John Glez MD on 9/29/2024 at 9:55 AM     Finalized by (CST): John Glez MD on 9/29/2024 at 10:01 AM       PROCEDURE:  CT ANGIOGRAPHY, CHEST (QCI=22460)       LOCATION:  [facility]         COMPARISON:  [facility], CT, CT ABDOMEN(CONTRAST ONLY) (CPT=74160), 4/29/2022, 9:21 AM.  [facility], CT, CT CHEST(CONTRAST ONLY) (CPT=71260), 4/16/2022, 12:47 PM.       INDICATIONS:  cough, difficulty breathing       TECHNIQUE:  IV contrast-enhanced multislice CT angiography is performed through the pulmonary arterial anatomy. 3D volume renderings are generated.  Dose reduction techniques were used. Dose information is transmitted to the ACR (American College of   Radiology) NRDR (National Radiology Data Registry) which includes the Dose Index Registry.       PATIENT STATED HISTORY:(As transcribed by Technologist)  Pt is complaining of a cough and [place].        CONTRAST USED:  100cc of Isovue 370        FINDINGS:     VASCULATURE:  No visible pulmonary arterial thrombus or attenuation.     THORACIC AORTA:  No aneurysm or visible dissection.     LUNGS:  Minimal atelectasis in the right middle lobe.  Peripheral reticular densities in the lungs are noted.  Minimal tree-in-bud opacities the right lower lobe are noted.   MEDIASTINUM:  No adenopathy or mass.     CORBIN:  No mass or adenopathy.     CARDIAC:  No enlargement, pericardial thickening, or significant calcification.   PLEURA:  No mass or effusion.     CHEST WALL:  No mass or axillary adenopathy.     LIMITED ABDOMEN:  Contrast versus calcification in the right kidney measures 1.2 cm.   BONES:  No bony lesion or fracture.     OTHER:  Negative.          Impression   CONCLUSION:         1. No evidence of pulmonary embolus.       2. Previously noted parenchymal abnormalities the lungs have resolved.  Minimal tree-in-bud reticular densities in the right lower lobe are noted.  This may be sequelae of prior inflammatory/atypical infectious process.       3. No consolidation.       4. High density in the right renal collecting system may be due to contrast.  Interval development of a large calcification since prior examination is considered less likely.       Agree with preliminary radiology report from [facility] radiology.                Dictated by (CST): Rj Sr MD on 11/24/2022 at 6:31 AM       Finalized by (CST): Rj Sr MD on 11/24/2022 at 6:35 AM          PROCEDURE:  CT ABDOMEN (CONTRAST ONLY) (CPT=74160)       COMPARISON:  [facility], CT, CT ABD(C/S) / PELVIS(C/S), 8/05/2011, 9:47 AM.  [facility], CT, CT CHEST(CONTRAST ONLY) (CPT=71260), 4/16/2022, 12:47 PM.       INDICATIONS:  R93.3 Abnormal CT scan, small bowel M32.9 Systemic lupus erythematosus, unspecified SLE type, unspecified organ involvement status (HCC)       TECHNIQUE:  CT scanning is performed from the dome of the diaphragm to the iliac crests with non-ionic intravenous contrast  material.  Coronal MPR imaging was obtained.  Dose reduction techniques were used. Dose information is transmitted to the ACR   (American College of Radiology) NRDR (National Radiology Data Registry) which includes the Dose Index Registry.       PATIENT STATED HISTORY:(As transcribed by Technologist)  Patient states that she has no complaints, but has history of lupus and previous abnormal ct scan.        CONTRAST USED:  80cc of Omnipaque 350       FINDINGS:     LUNG BASES:  Slowly resolving inflammatory ground-glass and reticular opacities within the lung bases consistent with improving COVID-19 pneumonia.   LIVER:  No enlargement, atrophy, abnormal density, or significant focal lesion.     BILIARY:  No visible dilatation or calcification.     PANCREAS:  Tiny nonenhancing low-attenuation lesion within the uncinate process of the pancreas measuring 4 mm likely represents a tiny cyst .  This is stable since 2011 and therefore is benign.   SPLEEN:  No enlargement or focal lesion.     KIDNEYS:  The left kidney is very atrophic and dysplastic.  The right kidney is hypertrophied.  There is a small region of scarring involving the right upper pole with mildly dilated calyx in the region of scarring.   ADRENALS:  No mass or enlargement.     AORTA/VASCULAR:  No aneurysm or dissection.     RETROPERITONEUM:  No mass or adenopathy.     BOWEL/MESENTERY:  No visible mass, obstruction, or bowel wall thickening.     ABDOMINAL WALL:  No mass or hernia.     BONES:  No bony lesion or fracture.                          Impression   CONCLUSION:         1.  Area seen on the recent CT scan corresponds to normal bowel loops of jejunum on today's scan with oral contrast.  There is no evidence of abdominal mass.         2. Slightly improving patchy airspace opacities within the lung bases consistent with COVID-19 pneumonia       3. Severely atrophic left kidney with compensatory hypertrophy of the right kidney.             Dictated by  (CST): Edward Grant MD on 4/29/2022 at 10:20 AM       Finalized by (CST): Edward Grant MD on 4/29/2022 at 10:26 AM     PROCEDURE:  CT CHEST(CONTRAST ONLY) (CPT=71260)       COMPARISON:  [facility] , CT, CT ABDOMEN+PELVIS KIDNEYSTONE 2D RNDR(NO IV,NO ORAL)(CPT=74176), 9/09/2017, 6:17 AM.  [facility], XR, XR CHEST PA + LAT CHEST (CPT=71046), 4/11/2022, 2:34 PM.       INDICATIONS:  R93.89 Abnormal CXR R05.9 Cough R06.02 Shortness of breath M32.9 Systemic lupus erythematosus, unspecified SLE type, unspecified organ involvement status (HCC)       TECHNIQUE:  CT images were obtained with non-ionic intravenous contrast material. Dose reduction techniques were used. Dose information is transmitted to the ACR (American College of Radiology) NRDR (National Radiology Data Registry) which includes the   Dose Index Registry.       PATIENT STATED HISTORY:(As transcribed by Technologist)  Follow up on cxr 4/11/2022. SOB with cough for few days.        CONTRAST USED:  75cc of Omnipaque 350       FINDINGS:     LUNGS:  There are multiple bilateral ground-glass interstitial infiltrates scattered throughout both upper lobes, right middle lobe and both lower lobes.  There is some mild airspace disease with air bronchograms seen in the right lower lobe also.     Although nonspecific, the findings could reflect COVID-19 pneumonia in the proper clinical setting.  Other interstitial lung disease is not excluded.  The trachea and major bronchi are patent.   VASCULATURE:  No visible pulmonary arterial thrombus or attenuation.     CORBIN:  No mass or adenopathy.     MEDIASTINUM:  No mass or adenopathy.     CARDIAC:  No enlargement, pericardial thickening, or significant calcification.   PLEURA:  No mass or effusion.     THORACIC AORTA:  No dissection or aneurysm.  There is a 4 vessel aortic arch incidentally noted.   CHEST WALL:  No mass or axillary adenopathy.     LIMITED ABDOMEN:  There is a 5.6 x 5.2 cm area of masslike opacification within the  left upper quadrant just below the pancreatic body and tail.  This could reflect non-opacified loops of small bowel however a mass is not excluded and follow-up CT of the    abdomen with IV and oral contrast is recommended.   BONES:  No bony lesion or fracture.                          Impression   CONCLUSION:     1. There are numerous peripheral ground-glass interstitial infiltrates scattered throughout both lungs.  The findings are highly concerning for COVID-19 pneumonia in the proper clinical setting.  Other interstitial lung disease is not excluded.  Clinical    correlation is necessary.   2. There is a 5.6 x 5.2 cm area of soft tissue density masslike opacification in the left upper quadrant of the abdomen.  This likely reflects loops of adjacent jejunum however it is difficult to exclude with certainty a mass and follow-up CT of the   abdomen with IV and oral contrast is recommended.       The results and recommendations were discussed with the on-call physician, Dr. Baumann, at 1850 hours on 4/16/2022.           Dictated by (CST): Hunter Garcia MD on 4/16/2022 at 6:36 PM       Finalized by (CST): Hunter Garcia MD on 4/16/2022 at 6:55 PM            Labs:  Lab Results   Component Value Date    WBC 7.4 07/26/2024    RBC 4.48 07/26/2024    HGB 13.5 07/26/2024    HCT 40.6 07/26/2024     07/26/2024    MPV 9.6 01/23/2013    MCV 90.6 07/26/2024    MCH 30.1 07/26/2024    MCHC 33.3 07/26/2024    RDW 12.3 07/26/2024    NEPRELIM 25.33 (H) 12/02/2022    NEUTABS 5.3 05/21/2012    LYMPHABS 1.9 05/21/2012    NEUT 70 05/21/2012    LYMPH 25 05/21/2012    MON 4 05/21/2012    EOS 1 05/21/2012    NEPERCENT 70.2 07/26/2024    LYPERCENT 19.4 07/26/2024    MOPERCENT 9.0 07/26/2024    EOPERCENT 0.7 07/26/2024    BAPERCENT 0.7 07/26/2024    NE 5,195 07/26/2024    LYMABS 1,436 07/26/2024    MOABSO 666 07/26/2024    EOABSO 52 07/26/2024    BAABSO 52 07/26/2024     Lab Results   Component Value Date    GLU 79 07/26/2024     BUN 21 07/26/2024    BUNCREA SEE NOTE: 07/26/2024    CREATSERUM 1.02 07/26/2024    ANIONGAP 5 12/03/2022    GFR 82 09/09/2017    GFRNAA 68 06/28/2022    GFRAA 78 06/28/2022    CA 9.0 07/26/2024    OSMOCALC 293 12/03/2022    ALKPHO 94 07/26/2024    AST 22 07/26/2024    ALT 22 07/26/2024    BILT 0.4 07/26/2024    TP 6.3 07/26/2024    ALB 4.0 07/26/2024    GLOBULIN 2.3 07/26/2024    AGRATIO 1.7 07/26/2024     07/26/2024    K 4.8 07/26/2024     07/26/2024    CO2 29 07/26/2024       ECHO Feb 2024  Conclusions:     1. Left ventricle: The cavity size was normal. Wall thickness was normal.      Systolic function was normal. The estimated ejection fraction was 55-60%.      Left ventricular diastolic function parameters were normal.   2. No significant valve disease.   Impressions:  This study is compared with previous dated 05/20/2020: No   significant change since prior study.       Additional Labs:  02/2024  SUDHIR 1:80 nuclear, homogenous  Cr 1.08  ESR 6 normal  CRP normal   C3 86 normal   C4 23 normal       12/2022  CMP with creatinine 1.09; AST 32 normal; ALT 56 borderline elevated  ESR 9 normal  UA with trace leuk esterase otherwise negative.  Culture negative  CBC grossly normal  C3 96 normal  C4 21 normal  CRP 3.3 normal    06/2022  Vit D 46 normal  DsDNA negative  UA trace ketones; 2+leuk est 20-40 WBC no blood or protein   C3 77 low   C4 21 normal  ESR 6 normal  CRP 2.0 normal     12/7/2021  Aldolase normal  CK normal  CMP with a ST 26; ALT 47    12/1/2021  CRP 8.4  C3 79  dsDNA negative  CBC grossly normal  ESR 11 normal  CMP with AST 45; ALT 73    10/2021  CRP 20.6  C4 27 normal  C3 95 normal  dsDNA 6 equivocal  CBC grossly normal  ESR 17  CMP with AST 25; ALT 35    02/2021  dsDNA negative  CRP 2.4 normal  C4 22 normal  C3 82 borderline low  ESR 9 normal    08/2020  AVISE- SUDHIR+ but negative by IFA; dsDNA positive but negative on confirmatory +CBCAPs; +PsPt; otherwise negative panel  IgA, IgG, IgM  normal  Crp normal  C4 25 normal  C3 90 normal  Smooth muscle negative  ESR normal  Mitochondrial negative    09/2011  dsDNA 10 (N<9)  CCP negative     06/2012  dsDNA negative  RNP negative  Gaming negative  SCL 70 negative   SSA negative  SSB negative  Chromatin negative  Michell 1 negative   Centromere negative  [id number] negative  CCP negative     01/2013  Smooth muscle antibody negative  Alpha-1 antitrypsin elevated  dsDNA 257 (N<100)  histone 133 (N<100)  centromere, michell 1, SSA, SSB , RNP, smith negative   cardiolipin negative      03/2013  dsDNA negative  RNP negative  Gaming negative  SCL 70-  SSA negative  SSB negative  Chromatin negative  Michell 1 negative   Centromere negative  cardiolipin negative   [id number] negative     07/2013  dsDNA negative  RNP negative  Gaming negative  SCL 70-  SSA negative  SSB negative  Chromatin negative  Michell 1 negative   Centromere negative     09/2013  dsDNA negative  RNP negative  Gaming negative  SCL 70-  SSA negative  SSB negative  Chromatin negative  Michell 1 negative   Centromere negative     07/2017  dsDNA 1:160     C3  03/2013 65 low  07/2013 71 low  09/2013 71 low  02/2017 76 low   07/2017 76.6 low      C4  03/2013 15 normal  07/2013 22 normal  09/2013 22 normal  02/2017 19 normal   07/2017 20.3 normal      CRP  03/2013 82.9 very elevated   07/2013 normal   09/2013 normal  07/2017 20.3 normal   11/2019 normal      ESR  03/2013 normal   07/2013 normal   09/2013 normal  11/2019 normal       Cece Champagne DO  [facility]  09/30/2024

## 2024-10-22 ENCOUNTER — PATIENT MESSAGE (OUTPATIENT)
Dept: RHEUMATOLOGY | Facility: CLINIC | Age: 54
End: 2024-10-22

## 2024-10-22 LAB
C-REACTIVE PROTEIN: <3 MG/L
COMPLEMENT COMPONENT C3C: 90 MG/DL (ref 83–193)
COMPLEMENT COMPONENT C4C: 18 MG/DL (ref 15–57)
IMMUNOGLOBULIN A: 152 MG/DL (ref 47–310)
IMMUNOGLOBULIN G: 936 MG/DL (ref 600–1640)
IMMUNOGLOBULIN M: 126 MG/DL (ref 50–300)
SED RATE BY MODIFIED$WESTERGREN: 9 MM/H

## 2024-12-19 ENCOUNTER — PATIENT OUTREACH (OUTPATIENT)
Dept: CASE MANAGEMENT | Age: 54
End: 2024-12-19

## 2024-12-19 PROBLEM — J98.01 ACUTE BRONCHOSPASM: Status: RESOLVED | Noted: 2022-12-02 | Resolved: 2024-12-19

## 2024-12-19 NOTE — PROCEDURES
The office order to remove patient from the asthma registry is Approved and finalized on December 19, 2024.

## 2025-01-07 ENCOUNTER — OFFICE VISIT (OUTPATIENT)
Facility: CLINIC | Age: 55
End: 2025-01-07
Payer: COMMERCIAL

## 2025-01-07 ENCOUNTER — ULTRASOUND ENCOUNTER (OUTPATIENT)
Facility: CLINIC | Age: 55
End: 2025-01-07
Payer: COMMERCIAL

## 2025-01-07 VITALS
DIASTOLIC BLOOD PRESSURE: 72 MMHG | HEIGHT: 64.5 IN | BODY MASS INDEX: 26.14 KG/M2 | SYSTOLIC BLOOD PRESSURE: 110 MMHG | WEIGHT: 155 LBS

## 2025-01-07 DIAGNOSIS — N83.201 CYST OF RIGHT OVARY: Primary | ICD-10-CM

## 2025-01-07 DIAGNOSIS — R10.2 PELVIC PAIN: Primary | ICD-10-CM

## 2025-01-07 PROBLEM — Z98.891 HX OF CESAREAN SECTION: Status: ACTIVE | Noted: 2025-01-07

## 2025-01-07 PROCEDURE — 99203 OFFICE O/P NEW LOW 30 MIN: CPT | Performed by: OBSTETRICS & GYNECOLOGY

## 2025-01-23 ENCOUNTER — OFFICE VISIT (OUTPATIENT)
Facility: CLINIC | Age: 55
End: 2025-01-23
Payer: COMMERCIAL

## 2025-01-23 VITALS
HEIGHT: 64.5 IN | DIASTOLIC BLOOD PRESSURE: 74 MMHG | WEIGHT: 155 LBS | SYSTOLIC BLOOD PRESSURE: 110 MMHG | BODY MASS INDEX: 26.14 KG/M2

## 2025-01-23 DIAGNOSIS — Z12.31 SCREENING MAMMOGRAM FOR BREAST CANCER: ICD-10-CM

## 2025-01-23 DIAGNOSIS — Z80.3 FAMILY HISTORY OF BREAST CANCER: ICD-10-CM

## 2025-01-23 DIAGNOSIS — Z01.419 ENCOUNTER FOR WELL WOMAN EXAM WITH ROUTINE GYNECOLOGICAL EXAM: Primary | ICD-10-CM

## 2025-01-23 DIAGNOSIS — D68.51 FACTOR V LEIDEN (HCC): ICD-10-CM

## 2025-01-23 DIAGNOSIS — D68.61 ANTIPHOSPHOLIPID SYNDROME (HCC): ICD-10-CM

## 2025-01-23 DIAGNOSIS — Z90.710 S/P HYSTERECTOMY: ICD-10-CM

## 2025-01-23 PROCEDURE — 99396 PREV VISIT EST AGE 40-64: CPT

## 2025-01-23 NOTE — PROGRESS NOTES
GYN H&P     Genetic questionnaire reviewed with the patient and she will be referred for genetic counseling if the questionnaire had any positive results.    The [facility] intake form was also reviewed regarding contraception, menstrual periods, urinary health, and vaginal / sexual health    2025  9:34 AM    Chief Complaint   Patient presents with    Gynecologic Exam       HPI: Brenda is a 55 year old  Patient's last menstrual period was 05/15/2013.  (contraception: hysterectomy) here for her annual gyn exam.     Hx of hysterectomy to remove essure. Denies any breast complaints.Was seen by Dr. Thompson on 25 for pelvic pain - ultrasound was done at that time showing surgically absent uterus with normal ovaries. States the RLQ pain is improving in nature and only notices it every one in a while.     Family hx of breast cancer in her sister at age 46, her sister did not have any genetic testing done. The patient herself has had genetic counseling with Izzy Marsh.    Previous encounters and chart reviewed.     OB History    Para Term  AB Living   3 2 2 0 1 2   SAB IAB Ectopic Multiple Live Births   0 1 0   2      # Outcome Date GA Lbr Gurwinder/2nd Weight Sex Type Anes PTL Lv   3 Term / 37w0d  6 lb 10 oz (3.005 kg) F Vag-Spont   VIK   2 Term // 37w0d  7 lb 2 oz (3.232 kg) F CS-Unspec   VIK      Birth Comments: breech   1 IAB                GYN hx:   Menarche: 13  Period Cycle (Days): n/a  Period Duration (Days): n/a  Period Flow: n/a  Use of Birth Control (if yes, specify type): Hysterectomy  Pap Date: 12  Pap Result Notes: neg  (2011 ASCUS,neg)  Follow Up Recommendation: no further paps - benign hyst      Past Medical History:    SUDHIR positive    Anemia, unspecified    Atrophic kidney    left    Autoimmune hepatitis (HCC)    resolved     Back pain    Belching    Bloating    Calculus of kidney    Constipation    Cystitis    Esophageal reflux    Esophagitis     Flatulence/gas pain/belching    Gallbladder disease    Thickening 3/2013    Hearing loss    Heartburn    Hemorrhoids    ILD (interstitial lung disease) (HCC)     Dr Tamy Ibarra Rheum    Internal hemorrhoid    Kidney stone    left side, atrophy to kidney    Leiomyoma of uterus, unspecified    Low iron    Lupus    Menorrhagia    Mild intermittent asthma (HCC)    Triggers: URI    Night sweats    Menopause    Pain in joint, multiple sites    PE (pulmonary embolism)    Pericardial effusion (HCC)    Pericarditis (HCC)    PONV (postoperative nausea and vomiting)    with c section     Primary hypercoagulable state (HCC)    lupus anticoagulant    Primary hypercoagulable state (HCC)    V leiden mutation    Psoriasis    Reactive airway disease (HCC)    Rheumatoid arthritis(714.0)    20 years on enbrel- resolved remission till 4970-7208- now with lupus like features    Shortness of breath    Wears glasses    Weight gain     Past Surgical History:   Procedure Laterality Date     25-28wks fetal sanket  age 17          Colonoscopy      Colonoscopy  2013    garret    Colonoscopy      Cystoscopy,ureteroscopy,lithotripsy Left 2017    L URS, laser litho, stone extraction, stent RPG, NSC    Egd      Hysterectomy  2013    RTLH with BS    Needle biopsy right Right 2016    US guided biopsy - benign **no clip was deployed**    Other      hsyteroscopy w insert of device to occlude fallopian tubes    Other  2011    Endoscopy    Other surgical history      ESSURE    Total abdom hysterectomy       Allergies[1]  Medications Ordered Prior to Encounter[2]  Family History   Problem Relation Age of Onset    Glaucoma Father     High Cholesterol Father             Diabetes Father         Type II    Cancer Father         prostate    Hypertension Father     Other (Other) Father     Other (peripheral vascular disease) Father     Other (stroke syndrome) Father     Prostate Cancer Father      Stroke Father     High Cholesterol Mother             Cancer Mother         lung    Diabetes Mother         type II    Hypertension Mother     Other (Other) Mother         Met to brain    Heart Disease Maternal Grandfather     Other (alzheimer's) Paternal Grandmother     Heart Disease Paternal Grandfather     Breast Cancer Sister 46    Other (Other) Sister         Sherri    No Known Problems Brother     Pancreatic Cancer Maternal Aunt     Breast Cancer Maternal Uncle     Cancer Other         No family hx Ovarian/Colon Ca     Social History     Socioeconomic History    Marital status:      Spouse name: Not on file    Number of children: Not on file    Years of education: Not on file    Highest education level: Not on file   Occupational History    Not on file   Tobacco Use    Smoking status: Former     Current packs/day: 0.00     Average packs/day: 1.5 packs/day for 14.0 years (21.0 ttl pk-yrs)     Types: Cigarettes     Start date: 1986     Quit date: 2000     Years since quittin.5     Passive exposure: Past    Smokeless tobacco: Never    Tobacco comments:     quit smoking    Vaping Use    Vaping status: Never Used   Substance and Sexual Activity    Alcohol use: Yes     Alcohol/week: 7.0 standard drinks of alcohol     Types: 7 Cans of beer per week     Comment: 1 beer nightly    Drug use: No    Sexual activity: Yes     Partners: Male     Birth control/protection: Surgical, Hysterectomy   Other Topics Concern     Service Not Asked    Blood Transfusions Not Asked    Caffeine Concern No     Comment: rare    Occupational Exposure Not Asked    Hobby Hazards Not Asked    Sleep Concern Not Asked    Stress Concern Not Asked    Weight Concern Not Asked    Special Diet Not Asked    Back Care Not Asked    Exercise No    Bike Helmet Not Asked    Seat Belt Not Asked    Self-Exams Not Asked   Social History Narrative    Not on file     Social Drivers of Health     Financial Resource  Strain: Not on file   Food Insecurity: Not on file   Transportation Needs: Not on file   Physical Activity: Not on file   Stress: Not on file   Social Connections: Not on file   Housing Stability: Not on file       ROS:     Review of Systems:  General: denies fevers, chills, fatigue and malaise.   Eyes: no visual changes, denies headaches  ENT: no complaints, denies earaches, runny nose, epistaxis, throat pain or sore throat  Respiratory: denies SOB, dyspnea, cough or wheezing  Cardiovascular: denies chest pain, palpitations, exercise intolerance   GI: denies abdominal pain, diarrhea, constipation  : no complaints, denies dysuria, increased urinary frequency. Menses absent (hysterectomy  Hematological/lymphatic: denies history of excessive bleeding or bruising, denies dizziness, lightheadedness.   Breast: denies rashes, skin changes, pain, lumps or discharge   Psychiatric: denies depression, changes in sleep patterns, anxiety  Endocrine: denies hot or cold intolerance, mood changes   Neurological: denies changes in sight, smell, hearing or taste. Denies seizures or tremors  Immunological: denies allergies, denies anaphylaxis, or swollen lymph nodes  Musculoskeletal: denies joint pain, morning stiffness, decreased range of motion         O /74   Ht 64.5\"   Wt 155 lb (70.3 kg)   LMP 05/15/2013   BMI 26.19 kg/m²         Wt Readings from Last 6 Encounters:   01/23/25 155 lb (70.3 kg)   01/07/25 155 lb (70.3 kg)   09/30/24 151 lb (68.5 kg)   08/23/24 149 lb 6.4 oz (67.8 kg)   07/23/24 146 lb (66.2 kg)   07/02/24 140 lb (63.5 kg)     Exam:   GENERAL: well developed, well nourished, in no apparent distress, oriented.  SKIN: no rashes, no suspicious lesions  HEENT: normal  NECK: supple; no thyromegaly, no adenopathy  LUNGS: clear to auscultation  CARDIOVASCULAR: normal S1, S2, RRR  BREASTS: soft, nontender, no palpable masses or nodes, no nipple discharge, no skin changes, no axillary adenopathy  ABDOMEN:  soft,  non distended; non tender, no masses  PELVIC: External Genitalia: Normal appearing, no lesions.    Vagina: normal pink mucosa, no lesions, normal clear discharge.    Bladder well supported.  No  anterior or posterior hernias    Cervix: surgically absent, vaginal cuff well-healed and nontender    Uterus: surgically absent    Adnexa: very slight tenderness to right adnexa - pt states it is better than when it was examined on 1/7    Rectal: deferred  EXTREMITIES:  non tender without edema        A/P: Patient is 55 year old female with no complaints. Here for well woman exam.            Patient counseled on:    Diet/exercise.      Self Breast Exams     Safe sex practices / and living environment     Vaccines:  Annual Flu          Pap: n/a, benign hyst  GC/Chlamydia:  n/a  Mammogram:  2/2024, reordered  Dexa:  n/a  Colonoscopy / Cologuard:   2/2024, repeat in 1 year - pt to call and schedule  Lipid / Cholesterol:  2024 per PCP         Meds This Visit:    Requested Prescriptions      No prescriptions requested or ordered in this encounter       1. Encounter for well woman exam with routine gynecological exam    2. Screening mammogram for breast cancer  - [facility] ROZ 2D+3D SCREENING BILAT (CPT=77067/15822); Future    3. Antiphospholipid syndrome (HCC)    4. Factor V Leiden (HCC)    5. S/P hysterectomy - Pelvic pain    6. Family history of breast cancer    RLQ pain improving - pt due for colonoscopy so plans to discuss pain with GI as well    Return in about 1 year (around 1/23/2026) for Well Woman Exam.    Elvia Rajesh, APRN   1/23/2025  9:34 AM       This note was created by [employer] voice recognition. Errors in content may be related to improper recognition by the system; efforts to review and correct have been done but errors may still exist. Please contact me with any questions.    Note to patient and family   The 21st Century Cures Act makes medical notes available to patients in the interest of transparency.  However,  please be advised that this is a medical document.  It is intended as [id number] communication.  It is written in medical language which may contain abbreviations or verbiage that are technical and unfamiliar.  It may appear blunt or direct.  Medical documents are intended to carry relevant information, facts as evident, and the clinical opinion of the practitioner.           [1]   Allergies  Allergen Reactions    Doxycycline      Sensitivity to light    [2]   Current Outpatient Medications on File Prior to Visit   Medication Sig Dispense Refill    hydroxychloroquine 200 MG Oral Tab Take 1 tablet (200 mg total) by mouth 3 (three) times a week. Tue, Thu and Sat. Take 2 tablets (400 mg total) by mouth 4 (four) times a week. Mon, Wed, Fri, Sun. 180 tablet 1    predniSONE 1 MG Oral Tab Take 3 tablets (3 mg total) by mouth daily with breakfast. 270 tablet 1    famotidine 40 MG Oral Tab Take 1 tablet (40 mg total) by mouth daily. 90 tablet 0    Bimatoprost 0.03 % External Solution 1 Application nightly.      albuterol 108 (90 Base) MCG/ACT Inhalation Aero Soln Inhale 2 puffs into the lungs every 4 to 6 hours. As needed      NAPROXEN 500 MG Oral Tab TAKE 1 TABLET BY MOUTH TWICE A DAY WITH MEALS 60 tablet 1    CRESTOR 5 MG Oral Tab Take 1 tablet (5 mg total) by mouth nightly. HAS NOT STARTED YET - 7/23 (Patient not taking: Reported on 8/23/2024)       No current facility-administered medications on file prior to visit.

## 2025-04-22 ENCOUNTER — LAB ENCOUNTER (OUTPATIENT)
Dept: LAB | Age: 55
End: 2025-04-22
Attending: INTERNAL MEDICINE
Payer: COMMERCIAL

## 2025-04-22 DIAGNOSIS — Z86.718 HISTORY OF THROMBOEMBOLISM OF VEIN: ICD-10-CM

## 2025-04-22 LAB — D DIMER PPP FEU-MCNC: 0.36 UG/ML FEU (ref ?–0.54)

## 2025-04-22 PROCEDURE — 36415 COLL VENOUS BLD VENIPUNCTURE: CPT

## 2025-04-22 PROCEDURE — 85379 FIBRIN DEGRADATION QUANT: CPT

## 2025-05-01 ENCOUNTER — HOSPITAL ENCOUNTER (OUTPATIENT)
Dept: MAMMOGRAPHY | Age: 55
Discharge: HOME | End: 2025-05-01
Payer: COMMERCIAL

## 2025-05-01 DIAGNOSIS — Z12.31 SCREENING MAMMOGRAM FOR BREAST CANCER: ICD-10-CM

## 2025-05-01 PROCEDURE — 77067 SCR MAMMO BI INCL CAD: CPT

## 2025-05-01 PROCEDURE — 77063 BREAST TOMOSYNTHESIS BI: CPT

## 2025-05-05 ENCOUNTER — OFFICE VISIT (OUTPATIENT)
Dept: RHEUMATOLOGY | Facility: CLINIC | Age: 55
End: 2025-05-05
Payer: COMMERCIAL

## 2025-05-05 VITALS
RESPIRATION RATE: 16 BRPM | TEMPERATURE: 98 F | HEIGHT: 64.5 IN | SYSTOLIC BLOOD PRESSURE: 102 MMHG | HEART RATE: 78 BPM | WEIGHT: 156 LBS | OXYGEN SATURATION: 99 % | BODY MASS INDEX: 26.31 KG/M2 | DIASTOLIC BLOOD PRESSURE: 60 MMHG

## 2025-05-05 DIAGNOSIS — M32.9 SYSTEMIC LUPUS ERYTHEMATOSUS, UNSPECIFIED SLE TYPE, UNSPECIFIED ORGAN INVOLVEMENT STATUS (HCC): Primary | ICD-10-CM

## 2025-05-05 DIAGNOSIS — M25.561 CHRONIC PAIN OF BOTH KNEES: ICD-10-CM

## 2025-05-05 DIAGNOSIS — Z86.79 HISTORY OF PERICARDITIS: ICD-10-CM

## 2025-05-05 DIAGNOSIS — Z79.52 CURRENT CHRONIC USE OF SYSTEMIC STEROIDS: ICD-10-CM

## 2025-05-05 DIAGNOSIS — R06.02 SHORTNESS OF BREATH: ICD-10-CM

## 2025-05-05 DIAGNOSIS — G89.29 CHRONIC PAIN OF BOTH KNEES: ICD-10-CM

## 2025-05-05 DIAGNOSIS — M25.562 CHRONIC PAIN OF BOTH KNEES: ICD-10-CM

## 2025-05-05 DIAGNOSIS — Z79.899 LONG-TERM USE OF PLAQUENIL: ICD-10-CM

## 2025-05-05 PROCEDURE — 99213 OFFICE O/P EST LOW 20 MIN: CPT | Performed by: INTERNAL MEDICINE

## 2025-05-05 RX ORDER — HYDROXYCHLOROQUINE SULFATE 200 MG/1
TABLET, FILM COATED ORAL
Qty: 180 TABLET | Refills: 1 | Status: SHIPPED | OUTPATIENT
Start: 2025-05-05

## 2025-05-05 RX ORDER — PREDNISONE 1 MG/1
3 TABLET ORAL
Qty: 270 TABLET | Refills: 1 | Status: SHIPPED | OUTPATIENT
Start: 2025-05-05

## 2025-05-05 NOTE — PROGRESS NOTES
RHEUMATOLOGY Follow up   Date of visit: 05/05/2025  ?  Chief Complaint   Patient presents with    SLE     7 month f/u. Feeling well over all. Left knee discomfort. Right hip pain. No other new symtpoms. Converted rapid score of 0.7     ?  ASSESSMENT, DISCUSSION & PLAN   Assessment:  1. Systemic lupus erythematosus, unspecified SLE type, unspecified organ involvement status (HCC)    2. History of pericarditis    3. Shortness of breath    4. Long-term use of Plaquenil    5. Current chronic use of systemic steroids    6. Chronic pain of both knees            Discussion:  Ms. Brenda Castro is a 56 yo woman with an extensive history.  It seems as if patient was initially diagnosed with CAREN/JRA when she was in her teenage years.  Was on multiple medications including sulfasalazine as well as Enbrel.  Due to the improvement of symptoms and possible remission, patient stopped all immunosuppressive therapy after her second child.  However after that time, it seems like she was diagnosed with pericarditis as well as possible pleuritis and started on steroids as well as Plaquenil.  Eventually she was started on CellCept.  She had self-discontinued CellCept due to lack of refill and then hesitation in restarting. She did have symptoms of the start of a flare of her pericarditis/pleuritis which responded to medrol dose pack and she has been able to resume 3mg of prednisone.  She has been on some level of prednisone, most recently 3mg daily. She has not been able to taper less than that without some recurrence of chest discomfort and indigestion.  She has been tolerating Plaquenil alternating 200 and 400 mg every other day based off of her weight.  She has not had any overt chest pain or discomfort like she has had before with the current regimen.  Prior sternal pain with associated pain on respiration, it is relieved with chiropractic care.  Her most recent lupus testing was grossly negative including normalization of her  persistently/chronically low C3 levels.  She lacks any overt signs of synovitis.    At this time, we will have her continue current dose of Plaquenil and current dose of prednisone.   She had prior borderline elevated LFTs, unclear if related to Latisse.  Updated labs was normal.   She now has irritation around the eyelids- previously recommended she follow back with ophthalmology since she is due for an update eye exam to monitor for plaquenil toxicity.   Reminded to complete work up including PFTs- offered CT but pt would like to avoid due to amount of radiation over the past few years for these CT's. Stressed again to get PFTs done so we can evaluate some of her symptoms further. Last echo/echo stress test was 2024.     Previously had discussion: Given desire to try to get off prednisone, discussed potential use of azathioprine.  We discussed potentially restarting MMF, however she was never able to completely get off steroids when taking MMF.  May also consider leflunomide or benlysta.     She does have some worsened knee pain, could be related to recent foot fracture but will get xrays to evaluate further.     Okay to follow-up in 4 months months or sooner as needed.   Encouraged to keep me updated on symptoms in the meantime.    Patient verbalized understanding of above instructions. No further questions at this time.    Code selection for this visit was based on time spent (30min) on date of service in preparing to see the patient, obtaining and/or reviewing separately obtained history, performing a medically appropriate examination, counseling and educating the patient/family/caregiver, ordering medications or testing, referring and communicating with other healthcare providers, documenting clinical information in the E HR, independently interpreting results and communicating results to the patient/family/caregiver and care coordination with the patient's other providers.    ?  Plan:  Diagnoses and all  orders for this visit:    Systemic lupus erythematosus, unspecified SLE type, unspecified organ involvement status (HCC)  -     Complete PFT; Future  -     Comp Metabolic Panel (14)  -     CBC With Differential With Platelet  -     XR KNEE (1 OR 2 VIEWS), RIGHT (CPT=73560); Future  -     XR KNEE (1 OR 2 VIEWS), LEFT (CPT=73560); Future  -     predniSONE 1 MG Oral Tab; Take 3 tablets (3 mg total) by mouth daily with breakfast.  -     hydroxychloroquine 200 MG Oral Tab; Take 1 tablet (200 mg total) by mouth 3 (three) times a week. Tue, Thu and Sat. Take 2 tablets (400 mg total) by mouth 4 (four) times a week. Mon, Wed, Fri, Sun.    History of pericarditis  -     Complete PFT; Future    Shortness of breath  -     Complete PFT; Future    Long-term use of Plaquenil  -     Complete PFT; Future  -     Comp Metabolic Panel (14)  -     CBC With Differential With Platelet    Current chronic use of systemic steroids  -     Complete PFT; Future    Chronic pain of both knees  -     XR KNEE (1 OR 2 VIEWS), RIGHT (CPT=73560); Future  -     XR KNEE (1 OR 2 VIEWS), LEFT (CPT=73560); Future              Return in about 4 months (around 9/5/2025).  ?  HPI   Brenda Castro is a 55 year old female with the following active problems who is seen for medically necessary follow-up today. She was seen as a new patient initially for evaluation of her lupus (has hx of CAREN/JRA as a child previously tx with gold, sza, Enbrel- off for many years), currently on hcq. She presents for follow up today.     Since her last visit, she has been doing well overall.  States in March, she broke her toe after hitting the couch leg- fractured left pinky toe (seen in urgent care)- wore boot for 3 weeks.  Having some left knee pain, thinks related to positioning. Not sure if related to compensating from the foot/toe.   Also had some right lower pelvic pain- had US which was negative. Feels similar to round ligament pain. Happened even prior to wearing  to the boot.   Denies any other current joint pain.  + neck pain but attributes to positioning when sleeping. Trying different pillows.     Continues with getting sick easily and significant cough with each sickness. Does get short of breath with exertion. Has not been able to get PFTs since always sick prior to each scheduled appt.     Prior elbow issues have improved- dx with olecranon bursitis. Given medrol dose pack. No longer having elbow pain.    Not having eye pain/redness.   Denies unexpected weight loss  Stable fatigue     Cardiac workup negative. Recommended crestor- has not started yet. No more episodes of chest pain with sob like in the winter. Following with cardiology.    Had Ddimer checked which was negative.     Denies morning stiffness and not feeling as much tightness from the hips/thighs.   Denies skin rashes but stable dry skin   Denies significant dry eyes/dry mouth.   Denies oral or nasal ulcers       Had colonoscopy with large polyp removal. Did not have EGD due to insurance requirements. Now following with Dr. Mccurdy- next cscope planned for the next few months. Denies diarrhea/constipation or blood in stools      Has continued with plaquenil alternating between 200 and 400mg and prednisone 3mg.  Did have an eye exam, Dr. Marie, no signs of retinal toxicity (exam in Oct 2024).   Interested in getting off steroids     Previously:  Sulfasalazine, gold and Enbrel for tx of JRA/CAREN  After second child, was feeling well overall in 2009, so discontinued Enbrel and was off until 2013.  Labs in 2011 showed some borderline LFTs and low C3 levels     HPI from initial consultation  referred for rheumatologic evaluation due to second opinion on her lupus and lupus treatment.      At age 17, she had a lot of joint pain in knees and fingers. She was seen by rheumatology at that time, had RF negative and dx with JRA/CAREN. Was on naprosyn and sulfasalazine and gold treatment, states was on that regimen for  a few years. Eventually, was started on Enbrel injections around 2000.  was on Enbrel therapy for several years until she had her first child. After delivery, restarted and with her second child in 2009, stopped and waited after delivery. States in 2011, when she moved back to the area, she set up care with another rheumatologist and as part of initial workup, was found to have low C3 levels. Had discussion of possibly starting medication for lupus but since she was feeling well, did not want to take medication.  In 2001, she had pulmonary issues with severe chest pain. CT scans were negative at that time. States symptoms went away after some time.   In 2007, when pregnant with her first child, she had developed cough and difficulty breathing in addition to lateral rib pain. Was diagnosed with pulmonary embolus as well as left leg DVT (despite no symptoms in the leg). Was placed on Lovenox for the remainder of her pregnancy. At that time, decision was made not to continue further anticoagulation.   Does have lupus anticoagulant positivity as well as Factor V Leiden.   Does still get ddimer checked occasionally.      In 2013, she developed some indigestion which would not go away with OTC remedies. Had pain/fullness when bending forward. Then developed shortness of breath significant with ambulation and even conversational. Was in the hospital, had pericarditis and pleural effusion. States SUDHIR has been positive for several years. Only had dsDNA positive once. Other serologies were negative. Continues to have C3 low and C4 normal. At that time, also had some issues with gall bladder and elevations in LFTs.   Initially started on plaquenil as well as steroids. Over the years, when trying to taper the steroids, she would developed worsened chest pain. Decision was made to add CellCept. Was on one tablet twice daily, was having difficulty remembering to take the second dose. Since she was doing well overall, she  has maintained on CellCept once daily as well as plaquenil 200mg daily. Has not been able to taper further down than 4mg prednisone.   Was also on plaquenil twice daily but again had difficulty remembering to take second pill so was discontinued.      + some right foot/big toe discomfort, feels like an superficial sensitivity to the touch. No obvious swelling or skin changes. Hx of cortisone injections in 2006/2007.  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  The patient denies oral or nasal ulcers, photosensitive rash, elevated or scarring rashes, Raynaud's phenomenon, or history of seizures.  Denies nonhealing ulcers on the fingertips or trouble swallowing.  The patient denies any history of uveitis, crampy abdominal pain, constipation, diarrhea, bloody stools, Achilles heel pain or symptoms of enthesitis, spooning or pitting of the nails, history of dactylitis.  There are no symptoms of severe dry eyes or swelling of the cheeks or under the jawbone.   No fevers, chills, lymphadenopathy, night sweats, unexpected weight loss, easy bruising or bleeding, or unexplained weakness.  Denies chronic sinus infections/disease or epistaxis.  Denies chronic cough or hemoptysis.   Denies obvious  blood in urine (aside from when had kidney stones)     Family hx:  Twin sister with Raynaud's, not other formal diagnosis    ?  Past Medical History:  Past Medical History:    SUDHIR positive    Anemia, unspecified    Atrophic kidney    left    Autoimmune hepatitis (HCC)    resolved     Back pain    Belching    Bloating    Calculus of kidney    Constipation    Cystitis    Esophageal reflux    Esophagitis    Flatulence/gas pain/belching    Gallbladder disease    Thickening 3/2013    Hearing loss    Heartburn    Hemorrhoids    ILD (interstitial lung disease) (HCC)     Dr Tamy Ibarra Rheum    Internal hemorrhoid    Kidney stone    left side, atrophy to kidney    Leiomyoma of uterus, unspecified    Low iron    Lupus    Menorrhagia    Mild intermittent asthma (HCC)    Triggers: URI    Night sweats    Menopause    Pain in joint, multiple sites    PE (pulmonary embolism)    Pericardial effusion (HCC)    Pericarditis (HCC)    PONV (postoperative nausea and vomiting)    with c section     Primary hypercoagulable state (HCC)    lupus anticoagulant    Primary hypercoagulable state (HCC)    V leiden mutation    Psoriasis    Reactive airway disease (HCC)    Rheumatoid arthritis(714.0)    20 years on enbrel- resolved remission till 6494-1498- now with lupus like features    Shortness of breath    Wears glasses    Weight gain     Past Surgical History:  Past Surgical History:   Procedure Laterality Date     25-28wks fetal sanket  age 17          Colonoscopy      Colonoscopy  2013    garret    Colonoscopy      Cystoscopy,ureteroscopy,lithotripsy Left 2017    L URS, laser litho, stone extraction, stent RPG, NSC    Egd      Hysterectomy  2013    RTLH with BS    Needle biopsy right Right 2016    US guided biopsy - benign **no clip was deployed**    Other      hsyteroscopy w insert of device to occlude fallopian tubes    Other  2011    Endoscopy    Other surgical history      ESSURE     Total abdom hysterectomy       Family History:  Family History   Problem Relation Age of Onset    Glaucoma Father     High Cholesterol Father             Diabetes Father         Type II    Cancer Father         prostate    Hypertension Father     Other (Other) Father     Other (peripheral vascular disease) Father     Other (stroke syndrome) Father     Prostate Cancer Father     Stroke Father     High Cholesterol Mother             Cancer Mother         lung    Diabetes Mother         type II    Hypertension Mother     Other (Other) Mother         Met to brain    Heart Disease Maternal Grandfather     Other (alzheimer's) Paternal Grandmother     Heart Disease Paternal Grandfather     Breast Cancer Sister 46    Other (Other) Sister         Sherri    No Known Problems Brother     Pancreatic Cancer Maternal Aunt     Breast Cancer Maternal Uncle     Cancer Other         No family hx Ovarian/Colon Ca     Social History:  Social History     Socioeconomic History    Marital status:    Tobacco Use    Smoking status: Former     Current packs/day: 0.00     Average packs/day: 1.5 packs/day for 14.0 years (21.0 ttl pk-yrs)     Types: Cigarettes     Start date: 1986     Quit date: 2000     Years since quittin.8     Passive exposure: Past    Smokeless tobacco: Never    Tobacco comments:     quit smoking    Vaping Use    Vaping status: Never Used   Substance and Sexual Activity    Alcohol use: Yes     Alcohol/week: 7.0 standard drinks of alcohol     Types: 7 Cans of beer per week     Comment: 1 beer nightly    Drug use: No    Sexual activity: Yes     Partners: Male     Birth control/protection: Surgical, Hysterectomy   Other Topics Concern    Caffeine Concern No     Comment: rare    Exercise No     Medications:  Outpatient Medications Marked as Taking for the 25 encounter (Office Visit) with Cece Champagne DO   Medication Sig Dispense Refill    predniSONE 1 MG Oral Tab Take 3  tablets (3 mg total) by mouth daily with breakfast. 270 tablet 1    hydroxychloroquine 200 MG Oral Tab Take 1 tablet (200 mg total) by mouth 3 (three) times a week. Tue, Thu and Sat. Take 2 tablets (400 mg total) by mouth 4 (four) times a week. Mon, Wed, Fri, Sun. 180 tablet 1    famotidine 40 MG Oral Tab Take 1 tablet (40 mg total) by mouth daily. 90 tablet 0    Bimatoprost 0.03 % External Solution 1 Application nightly.      albuterol 108 (90 Base) MCG/ACT Inhalation Aero Soln Inhale 2 puffs into the lungs every 4 to 6 hours. As needed      NAPROXEN 500 MG Oral Tab TAKE 1 TABLET BY MOUTH TWICE A DAY WITH MEALS 60 tablet 1     Modified Medications    Modified Medication Previous Medication    HYDROXYCHLOROQUINE 200 MG ORAL TAB hydroxychloroquine 200 MG Oral Tab       Take 1 tablet (200 mg total) by mouth 3 (three) times a week. Tue, Thu and Sat. Take 2 tablets (400 mg total) by mouth 4 (four) times a week. Mon, Wed, Fri, Sun.    Take 1 tablet (200 mg total) by mouth 3 (three) times a week. Tue, Thu and Sat. Take 2 tablets (400 mg total) by mouth 4 (four) times a week. Mon, Wed, Fri, Sun.    PREDNISONE 1 MG ORAL TAB predniSONE 1 MG Oral Tab       Take 3 tablets (3 mg total) by mouth daily with breakfast.    Take 3 tablets (3 mg total) by mouth daily with breakfast.     Medications Discontinued During This Encounter   Medication Reason    hydroxychloroquine 200 MG Oral Tab     predniSONE 1 MG Oral Tab          ?  ?  Allergies:  Allergies   Allergen Reactions    Doxycycline      Sensitivity to light      ?  REVIEW OF SYSTEMS   ?  Review of Systems   Constitutional:  Positive for malaise/fatigue. Negative for chills, fever and weight loss.   HENT:  Positive for tinnitus.    Eyes:  Negative for pain and redness.   Respiratory:  Positive for cough and shortness of breath (on exertion). Negative for hemoptysis.    Cardiovascular:  Negative for chest pain, palpitations and leg swelling.   Gastrointestinal:  Negative for  abdominal pain, blood in stool, constipation, diarrhea, heartburn (on famotidine) and nausea.   Genitourinary:  Positive for frequency (nocturia). Negative for dysuria and hematuria.   Musculoskeletal:  Positive for joint pain and neck pain. Negative for back pain and myalgias.   Skin:  Negative for itching and rash.   Neurological:  Negative for dizziness, tingling, seizures, weakness and headaches.   Endo/Heme/Allergies:  Does not bruise/bleed easily.   Psychiatric/Behavioral:  Negative for depression. The patient is not nervous/anxious and does not have insomnia.      PHYSICAL EXAM   Today's Vitals:  Temperature Blood Pressure Heart Rate Resp Rate SpO2   Temp: 97.6 °F (36.4 °C) BP: 102/60 Pulse: 78 Resp: 16 SpO2: 99 %   ?  Current Weight Height BMI BSA Pain   Wt Readings from Last 1 Encounters:   05/05/25 156 lb (70.8 kg)    Height: 5' 4.5\" (163.8 cm) Body mass index is 26.36 kg/m². Body surface area is 1.77 meters squared.         Physical Exam  Vitals and nursing note reviewed.   Constitutional:       General: She is not in acute distress.     Appearance: Normal appearance. She is well-developed. She is not diaphoretic.   HENT:      Head: Normocephalic.   Eyes:      General: No scleral icterus.     Extraocular Movements: Extraocular movements intact.      Conjunctiva/sclera: Conjunctivae normal.   Neck:      Vascular: No JVD.      Trachea: No tracheal deviation.   Cardiovascular:      Rate and Rhythm: Normal rate and regular rhythm.      Heart sounds: Normal heart sounds. No murmur heard.  Pulmonary:      Effort: Pulmonary effort is normal. No respiratory distress.      Breath sounds: Wheezing present. No rhonchi.   Musculoskeletal:         General: No swelling, tenderness or deformity.      Cervical back: Neck supple.      Comments: No evidence of heberden or jeanie nodes of any of the fingers, no basilar joint tenderness of the 1st CMC bilaterally.  No swelling, tenderness, redness or restriction of motion  of the DIPs, PIPs, MCPs, wrists, elbows, ankles.  Right 1st MTP with bunion and slight bony enlargement with tenderness along inferior aspect- no synovitis   Bilateral shoulders with full ROM.  Bilateral elbows restricted in extension- right worse than left - stable   Bilateral knee tenderness without crepitus.      Lymphadenopathy:      Cervical: No cervical adenopathy.   Skin:     General: Skin is warm and dry.      Findings: No erythema or rash.      Comments: No periungal erythema  No nail pitting  No malar rash   Neurological:      Mental Status: She is alert and oriented to person, place, and time.      Cranial Nerves: No cranial nerve deficit.      Gait: Gait normal.   Psychiatric:         Mood and Affect: Mood normal.         Behavior: Behavior normal.       Radiology review:  [facility] ROZ 2D+3D SCREENING BILAT (CPT=77067/81658)  Result Date: 5/1/2025  CONCLUSION:  No suspicious change from prior mammography.  No mammographic evidence of malignancy.  BI-RADS CATEGORY:  DIAGNOSTIC CATEGORY 1 - NEGATIVE ASSESSMENT.   RECOMMENDATIONS:  ROUTINE MAMMOGRAM AND CLINICAL EVALUATION IN 12 MONTHS.     Your patient's answers to the health and family history questions collected during this mammogram indicate a potentially increased risk for breast cancer. It is recommended that this patient be evaluated in our Cancer Risk Assessment Clinic to determine eligibility for additional breast cancer screening, risk reduction strategies, and/or genetic testing. Providers are encouraged to contact our breast navigators at [phone] with any questions or for guidance regarding this recommendation.  A letter explaining the results in lay terms has been sent to the patient.  This exam was evaluated with a computer-aided device.  This patient's information has been entered into a reminder system with a target due date for the next mammogram.   LOCATION:  [facility]   Dictated by (CST): Vannessa Herbert MD on 5/01/2025 at 8:09 PM      Finalized by (CST): Vannessa Herbert MD on 5/01/2025 at 8:18 [place] [address] [phone]     PROCEDURE:  CT ANGIOGRAPHY, CHEST (EJF=97534)       LOCATION:  [place]         COMPARISON:  [facility], CT, CT ABDOMEN(CONTRAST ONLY) (CPT=74160), 4/29/2022, 9:21 AM.  [facility] ONLY[facility] (CPT=71260), 4/16/2022, 12:47 PM.       INDICATIONS:  cough, difficulty breathing       TECHNIQUE:  IV contrast-enhanced multislice CT angiography is performed through the pulmonary arterial anatomy. 3D volume renderings are generated.  Dose reduction techniques were used. Dose information is transmitted to the ACR (American College of   Radiology) NRDR (National Radiology Data Registry) which includes the Dose Index Registry.       PATIENT STATED HISTORY:(As transcribed by Technologist)  Pt is complaining of a cough and [place].        CONTRAST USED:  100cc of Isovue 370       FINDINGS:     VASCULATURE:  No visible pulmonary arterial thrombus or attenuation.     THORACIC AORTA:  No aneurysm or visible dissection.     LUNGS:  Minimal atelectasis in the right middle lobe.  Peripheral reticular densities in the lungs are noted.  Minimal tree-in-bud opacities the right lower lobe are noted.   MEDIASTINUM:  No adenopathy or mass.     CORBIN:  No mass or adenopathy.     CARDIAC:  No enlargement, pericardial thickening, or significant calcification.   PLEURA:  No mass or effusion.     CHEST WALL:  No mass or axillary adenopathy.     LIMITED ABDOMEN:  Contrast versus calcification in the right kidney measures 1.2 cm.   BONES:  No bony lesion or fracture.     OTHER:  Negative.          Impression   CONCLUSION:         1. No evidence of pulmonary embolus.       2. Previously noted parenchymal abnormalities the lungs have resolved.  Minimal tree-in-bud reticular densities in the right lower lobe are noted.  This may be sequelae of prior inflammatory/atypical infectious process.        3. No consolidation.       4. High density in the right renal collecting system may be due to contrast.  Interval development of a large calcification since prior examination is considered less likely.       Agree with preliminary radiology report from [facility].                Dictated by (CST): Rj Sr MD on 11/24/2022 at 6:31 AM       Finalized by (CST): Rj Sr MD on 11/24/2022 at 6:35 AM          PROCEDURE:  CT ABDOMEN (CONTRAST ONLY) (CPT=74160)       COMPARISON:  [facility], CT, CT ABD(C/S) / PELVIS(C/S), 8/05/2011, 9:47 AM.  [facility], CT, CT CHEST(CONTRAST ONLY) (CPT=71260), 4/16/2022, 12:47 PM.       INDICATIONS:  R93.3 Abnormal CT scan, small bowel M32.9 Systemic lupus erythematosus, unspecified SLE type, unspecified organ involvement status (HCC)       TECHNIQUE:  CT scanning is performed from the dome of the diaphragm to the iliac crests with non-ionic intravenous contrast material.  Coronal MPR imaging was obtained.  Dose reduction techniques were used. Dose information is transmitted to the ACR   (American College of Radiology) NRDR (National Radiology Data Registry) which includes the Dose Index Registry.       PATIENT STATED HISTORY:(As transcribed by Technologist)  Patient states that she has no complaints, but has history of lupus and previous abnormal ct scan.        CONTRAST USED:  80cc of Omnipaque 350       FINDINGS:     LUNG BASES:  Slowly resolving inflammatory ground-glass and reticular opacities within the lung bases consistent with improving COVID-19 pneumonia.   LIVER:  No enlargement, atrophy, abnormal density, or significant focal lesion.     BILIARY:  No visible dilatation or calcification.     PANCREAS:  Tiny nonenhancing low-attenuation lesion within the uncinate process of the pancreas measuring 4 mm likely represents a tiny cyst .  This is stable since 2011 and therefore is benign.   SPLEEN:  No enlargement or focal lesion.     KIDNEYS:  The left  kidney is very atrophic and dysplastic.  The right kidney is hypertrophied.  There is a small region of scarring involving the right upper pole with mildly dilated calyx in the region of scarring.   ADRENALS:  No mass or enlargement.     AORTA/VASCULAR:  No aneurysm or dissection.     RETROPERITONEUM:  No mass or adenopathy.     BOWEL/MESENTERY:  No visible mass, obstruction, or bowel wall thickening.     ABDOMINAL WALL:  No mass or hernia.     BONES:  No bony lesion or fracture.                          Impression   CONCLUSION:         1.  Area seen on the recent CT scan corresponds to normal bowel loops of jejunum on today's scan with oral contrast.  There is no evidence of abdominal mass.         2. Slightly improving patchy airspace opacities within the lung bases consistent with COVID-19 pneumonia       3. Severely atrophic left kidney with compensatory hypertrophy of the right kidney.             Dictated by (CST): Edward Grant MD on 4/29/2022 at 10:20 AM       Finalized by (CST): Edward Grant MD on 4/29/2022 at 10:26 AM     PROCEDURE:  CT CHEST(CONTRAST ONLY) (CPT=71260)       COMPARISON:  [facility] , CT, CT ABDOMEN+PELVIS KIDNEYSTONE 2D RNDR(NO IV,NO ORAL)(CPT=74176), 9/09/2017, 6:17 AM.  [facility], XR, XR CHEST PA + LAT CHEST (CPT=71046), 4/11/2022, 2:34 PM.       INDICATIONS:  R93.89 Abnormal CXR R05.9 Cough R06.02 Shortness of breath M32.9 Systemic lupus erythematosus, unspecified SLE type, unspecified organ involvement status (HCC)       TECHNIQUE:  CT images were obtained with non-ionic intravenous contrast material. Dose reduction techniques were used. Dose information is transmitted to the ACR (American College of Radiology) NRDR (National Radiology Data Registry) which includes the   Dose Index Registry.       PATIENT STATED HISTORY:(As transcribed by Technologist)  Follow up on cxr 4/11/2022. SOB with cough for few days.        CONTRAST USED:  75cc of Omnipaque 350       FINDINGS:     LUNGS:  There  are multiple bilateral ground-glass interstitial infiltrates scattered throughout both upper lobes, right middle lobe and both lower lobes.  There is some mild airspace disease with air bronchograms seen in the right lower lobe also.     Although nonspecific, the findings could reflect COVID-19 pneumonia in the proper clinical setting.  Other interstitial lung disease is not excluded.  The trachea and major bronchi are patent.   VASCULATURE:  No visible pulmonary arterial thrombus or attenuation.     CORBIN:  No mass or adenopathy.     MEDIASTINUM:  No mass or adenopathy.     CARDIAC:  No enlargement, pericardial thickening, or significant calcification.   PLEURA:  No mass or effusion.     THORACIC AORTA:  No dissection or aneurysm.  There is a 4 vessel aortic arch incidentally noted.   CHEST WALL:  No mass or axillary adenopathy.     LIMITED ABDOMEN:  There is a 5.6 x 5.2 cm area of masslike opacification within the left upper quadrant just below the pancreatic body and tail.  This could reflect non-opacified loops of small bowel however a mass is not excluded and follow-up CT of the    abdomen with IV and oral contrast is recommended.   BONES:  No bony lesion or fracture.                          Impression   CONCLUSION:     1. There are numerous peripheral ground-glass interstitial infiltrates scattered throughout both lungs.  The findings are highly concerning for COVID-19 pneumonia in the proper clinical setting.  Other interstitial lung disease is not excluded.  Clinical    correlation is necessary.   2. There is a 5.6 x 5.2 cm area of soft tissue density masslike opacification in the left upper quadrant of the abdomen.  This likely reflects loops of adjacent jejunum however it is difficult to exclude with certainty a mass and follow-up CT of the   abdomen with IV and oral contrast is recommended.       The results and recommendations were discussed with the on-call physician, Dr. Baumann, at 1850 hours on  4/16/2022.           Dictated by (CST): Hunter Garcia MD on 4/16/2022 at 6:36 PM       Finalized by (CST): Hunter Garcia MD on 4/16/2022 at 6:55 PM            Labs:  Lab Results   Component Value Date    WBC 7.4 07/26/2024    RBC 4.48 07/26/2024    HGB 13.5 07/26/2024    HCT 40.6 07/26/2024     07/26/2024    MPV 9.6 01/23/2013    MCV 90.6 07/26/2024    MCH 30.1 07/26/2024    MCHC 33.3 07/26/2024    RDW 12.3 07/26/2024    NEPRELIM 25.33 (H) 12/02/2022    NEUTABS 5.3 05/21/2012    LYMPHABS 1.9 05/21/2012    NEUT 70 05/21/2012    LYMPH 25 05/21/2012    MON 4 05/21/2012    EOS 1 05/21/2012    NEPERCENT 70.2 07/26/2024    LYPERCENT 19.4 07/26/2024    MOPERCENT 9.0 07/26/2024    EOPERCENT 0.7 07/26/2024    BAPERCENT 0.7 07/26/2024    NE 5,195 07/26/2024    LYMABS 1,436 07/26/2024    MOABSO 666 07/26/2024    EOABSO 52 07/26/2024    BAABSO 52 07/26/2024     Lab Results   Component Value Date    GLU 79 07/26/2024    BUN 21 07/26/2024    BUNCREA SEE NOTE: 07/26/2024    CREATSERUM 1.02 07/26/2024    ANIONGAP 5 12/03/2022    GFR 82 09/09/2017    GFRNAA 68 06/28/2022    GFRAA 78 06/28/2022    CA 9.0 07/26/2024    OSMOCALC 293 12/03/2022    ALKPHO 94 07/26/2024    AST 22 07/26/2024    ALT 22 07/26/2024    BILT 0.4 07/26/2024    TP 6.3 07/26/2024    ALB 4.0 07/26/2024    GLOBULIN 2.3 07/26/2024    AGRATIO 1.7 07/26/2024     07/26/2024    K 4.8 07/26/2024     07/26/2024    CO2 29 07/26/2024       ECHO Feb 2024  Conclusions:     1. Left ventricle: The cavity size was normal. Wall thickness was normal.      Systolic function was normal. The estimated ejection fraction was 55-60%.      Left ventricular diastolic function parameters were normal.   2. No significant valve disease.   Impressions:  This study is compared with previous dated 05/20/2020: No   significant change since prior study.       Additional Labs:  10/2024  ESR 9 normal  CRP normal   C3 90 normal   C4 18 normal   IgA, IgG, IgM normal      02/2024  SUDHIR 1:80 nuclear, homogenous  Cr 1.08  ESR 6 normal  CRP normal   C3 86 normal   C4 23 normal       12/2022  CMP with creatinine 1.09; AST 32 normal; ALT 56 borderline elevated  ESR 9 normal  UA with trace leuk esterase otherwise negative.  Culture negative  CBC grossly normal  C3 96 normal  C4 21 normal  CRP 3.3 normal    06/2022  Vit D 46 normal  DsDNA negative  UA trace ketones; 2+leuk est 20-40 WBC no blood or protein   C3 77 low   C4 21 normal  ESR 6 normal  CRP 2.0 normal     12/7/2021  Aldolase normal  CK normal  CMP with a ST 26; ALT 47    12/1/2021  CRP 8.4  C3 79  dsDNA negative  CBC grossly normal  ESR 11 normal  CMP with AST 45; ALT 73    10/2021  CRP 20.6  C4 27 normal  C3 95 normal  dsDNA 6 equivocal  CBC grossly normal  ESR 17  CMP with AST 25; ALT 35    02/2021  dsDNA negative  CRP 2.4 normal  C4 22 normal  C3 82 borderline low  ESR 9 normal    08/2020  AVISE- SUDHIR+ but negative by IFA; dsDNA positive but negative on confirmatory +CBCAPs; +PsPt; otherwise negative panel  IgA, IgG, IgM normal  Crp normal  C4 25 normal  C3 90 normal  Smooth muscle negative  ESR normal  Mitochondrial negative    09/2011  dsDNA 10 (N<9)  CCP negative     06/2012  dsDNA negative  RNP negative  Gaming negative  SCL 70 negative   SSA negative  SSB negative  Chromatin negative  Michell 1 negative   Centromere negative  [id number] negative  CCP negative     01/2013  Smooth muscle antibody negative  Alpha-1 antitrypsin elevated  dsDNA 257 (N<100)  histone 133 (N<100)  centromere, michell 1, SSA, SSB , RNP, smith negative   cardiolipin negative      03/2013  dsDNA negative  RNP negative  Gaming negative  SCL 70-  SSA negative  SSB negative  Chromatin negative  Michell 1 negative   Centromere negative  cardiolipin negative   [id number] negative     07/2013  dsDNA negative  RNP negative  Gaming negative  SCL 70-  SSA negative  SSB negative  Chromatin negative  Michell 1 negative   Centromere negative     09/2013  dsDNA  negative  RNP negative  Gaming negative  SCL 70-  SSA negative  SSB negative  Chromatin negative  Michell 1 negative   Centromere negative     07/2017  dsDNA 1:160     C3  03/2013 65 low  07/2013 71 low  09/2013 71 low  02/2017 76 low   07/2017 76.6 low      C4  03/2013 15 normal  07/2013 22 normal  09/2013 22 normal  02/2017 19 normal   07/2017 20.3 normal      CRP  03/2013 82.9 very elevated   07/2013 normal   09/2013 normal  07/2017 20.3 normal   11/2019 normal      ESR  03/2013 normal   07/2013 normal   09/2013 normal  11/2019 normal       Cece Champagne, DO  [facility]  05/05/2025

## 2025-05-19 ENCOUNTER — OFFICE VISIT (OUTPATIENT)
Facility: CLINIC | Age: 55
End: 2025-05-19
Payer: COMMERCIAL

## 2025-05-19 ENCOUNTER — PATIENT MESSAGE (OUTPATIENT)
Facility: CLINIC | Age: 55
End: 2025-05-19

## 2025-05-19 VITALS
BODY MASS INDEX: 25.8 KG/M2 | HEART RATE: 82 BPM | WEIGHT: 153 LBS | HEIGHT: 64.5 IN | RESPIRATION RATE: 16 BRPM | DIASTOLIC BLOOD PRESSURE: 70 MMHG | OXYGEN SATURATION: 94 % | SYSTOLIC BLOOD PRESSURE: 108 MMHG

## 2025-05-19 DIAGNOSIS — Z51.81 ENCOUNTER FOR THERAPEUTIC DRUG LEVEL MONITORING: Primary | ICD-10-CM

## 2025-05-19 DIAGNOSIS — D68.61 ANTIPHOSPHOLIPID SYNDROME (HCC): ICD-10-CM

## 2025-05-19 DIAGNOSIS — M32.8 OTHER FORMS OF SYSTEMIC LUPUS ERYTHEMATOSUS, UNSPECIFIED ORGAN INVOLVEMENT STATUS (HCC): ICD-10-CM

## 2025-05-19 DIAGNOSIS — E66.3 OVERWEIGHT (BMI 25.0-29.9): ICD-10-CM

## 2025-05-19 DIAGNOSIS — E78.5 HYPERLIPIDEMIA, UNSPECIFIED HYPERLIPIDEMIA TYPE: ICD-10-CM

## 2025-05-19 DIAGNOSIS — Z86.32 HISTORY OF GESTATIONAL DIABETES: ICD-10-CM

## 2025-05-19 DIAGNOSIS — Z79.52 CURRENT CHRONIC USE OF SYSTEMIC STEROIDS: ICD-10-CM

## 2025-05-19 PROCEDURE — 99204 OFFICE O/P NEW MOD 45 MIN: CPT | Performed by: PHYSICIAN ASSISTANT

## 2025-05-19 RX ORDER — TIRZEPATIDE 2.5 MG/.5ML
2.5 INJECTION, SOLUTION SUBCUTANEOUS WEEKLY
Qty: 2 ML | Refills: 0 | Status: SHIPPED | OUTPATIENT
Start: 2025-05-19 | End: 2025-05-27

## 2025-05-19 NOTE — PROGRESS NOTES
HISTORY OF PRESENT ILLNESS  Chief Complaint   Patient presents with    Weight Problem     Ref by FB group, have tried skinny up (Drops), open to meds       Brenda Nancy Castro is a 55 year old female new to our office today for initiation of medical weight loss program.  Patient presents today with c/o excess weight.       Prednisone for the past 12 years due to SLE  2013 hysterectomy  Last year started to exercise, then was having some problems with breathing, saw cardiologist, said heart was fine, is seeing pulmonologist going to get PFT  Lack of movement, menopause, age, travel - daughter plays travel VB  Felt more comfortable around 130-135lbs  H/O elevated liver enzymes    Reason/goal for weight loss:     Previous weight loss efforts in the past/medication: yo-yo, skinnyup drops    Interest in Bariatric surgery:     Barriers to weight loss: Traveling; eating on the go; limited time to cook; too tired to cook     Wt Readings from Last 6 Encounters:   05/27/25 150 lb (68 kg)   05/19/25 153 lb (69.4 kg)   05/05/25 156 lb (70.8 kg)   01/23/25 155 lb (70.3 kg)   01/07/25 155 lb (70.3 kg)   09/30/24 151 lb (68.5 kg)          Breakfast Lunch Dinner Snacks Fluids   Buffet at hotel; eggs, fruit, [place] toast    [employer] breakfast sandwich, egg, heredia, cheese  [place]  Watermelon  [place]   Apple with PB None     Salami, cheese, crackers - charcuterie  Banana  Pretzel    Microwave dinner - Bian[employer]'s Organic   Steak Tacos at a [place] restaurant     [place] [facility] Roast  Premade meals from [employer]   Guacomole with chips or pitas  Energy bar  Is drawn more to fruity   Water, Starbucks refresher.      Social hx and lifestyle reviewed:    Work:   Marital status: Yes  Support: Yes  Tobacco use: None  ETOH use: None  Supplements: none, sometimes vit D  Exercise: is limited due to SLE  Stress level: 5/10  Sleep hours and integrity: 7hrs a night, does not feel well rested    MEDICAL  HISTORY  PMH reviewed:   Cardiac disorders: 1/2013 pericardial effusion secondary to lupus  Depression/anxiety: negative   Glaucoma: negative   Kidney stones: Yes  Eating disorder: negative   Migraines: negative   Seizures: negative   Joint-related conditions: SLE, knees, legs, neck  Liver disease: negative   Renal disease: small L kidney  Diabetes: h/o gestational diabetes  Thyroid disease: negative   Constipation: negative  Miscarriage: negative   DVT: Yes, h/o PE, factor V leiden, antiphoshpolipid syndrome  Family or personal history of Pancreatic issues / Medullary Thyroid Cancer: negative    History of bariatric surgery: negative     FMH reviewed: obesity in parent/s or sibling:     REVIEW OF SYSTEMS  GENERAL: feels well otherwise  NECK: denies thickening   LUNGS: denies shortness of breath with exertion, no apnea   CARDIOVASCULAR: denies chest pain on exertion, denies palpitations or pedal edema  GI: denies abdominal pain.  No N/V/D/C  MUSCULOSKELETAL: see above  NEURO: denies headaches   PSYCH: denies change in behavior or mood, denies feeling sad or depressed     EXAM  /70   Pulse 82   Resp 16   Ht 5' 4.5\" (1.638 m)   Wt 153 lb (69.4 kg)   LMP 05/15/2013   SpO2 94%   BMI 25.86 kg/m² , Percent body fat: F >32% 32.50%       GENERAL: well developed, well nourished, in no apparent distress  SKIN: warm, pink, dry without rashes to exposed area   EYES: conjunctiva pink, sclera non icteric, PERRL  HEENT: atraumatic, normocephalic, O/p: Mallampati score-   NECK: supple, non tender, no adenopathy, no thyromegaly  LUNGS: CTA in all fields, breathing non labored  CARDIO: RRR without murmur, normal S1 and S2 without clicks or gallops, no pedal edema. EKG not performed in office  GI: rotund, no masses, HSM or tenderness  MUSCULOSKELETAL: grossly intact   NEURO: Oriented times three, full ROM of bilateral UE/LE  PSYCH: pleasant, cooperative, normal mood and affect    Lab Results   Component Value Date    WBC  6.1 05/22/2025    RBC 4.73 05/22/2025    HGB 14.2 05/22/2025    HCT 42.9 05/22/2025    MCV 90.7 05/22/2025    MCH 30.0 05/22/2025    MCHC 33.1 05/22/2025    RDW 12.6 05/22/2025     05/22/2025    MPV 9.6 01/23/2013     Lab Results   Component Value Date    GLU 80 05/22/2025    BUN 24 05/22/2025    BUNCREA 22 05/22/2025    CREATSERUM 1.07 (H) 05/22/2025    ANIONGAP 5 12/03/2022    GFR 82 09/09/2017    GFRNAA 68 06/28/2022    GFRAA 78 06/28/2022    CA 9.1 05/22/2025    OSMOCALC 293 12/03/2022    ALKPHO 104 05/22/2025    AST 26 05/22/2025    ALT 25 05/22/2025    BILT 0.6 05/22/2025    TP 6.5 05/22/2025    ALB 4.3 05/22/2025    GLOBULIN 2.2 05/22/2025    AGRATIO 2.0 05/22/2025     05/22/2025    K 4.2 05/22/2025     05/22/2025    CO2 27 05/22/2025     Lab Results   Component Value Date     09/24/2015    A1C 5.4 09/24/2015     Lab Results   Component Value Date    CHOLEST 212 (H) 07/26/2024    TRIG 92 07/26/2024    HDL 67 07/26/2024     (H) 07/26/2024    VLDL 22 05/14/2020    TCHDLRATIO 3.2 07/26/2024    NONHDLC 145 (H) 07/26/2024     Lab Results   Component Value Date    T4F 1.2 02/07/2022    TSH 1.28 02/07/2022    TSHT4 1.77 07/26/2024     Lab Results   Component Value Date    B12 794 05/16/2013     Lab Results   Component Value Date    VITD 46 06/28/2022       Medications Ordered Prior to Encounter[1]    ASSESSMENT  Initial Weight Data and Goal Weight Loss:  Weight Calculations  Initial Weight: 153 lbs  Initial Weight Date: 05/19/25  Today's Weight: 153 lbs  5% Goal: 7.65  10% Goal: 15.3  Total Weight Loss: 0 lbs    Diagnoses and all orders for this visit:    Encounter for therapeutic drug level monitoring  -     [facility] Dietician Referral  ([facility] USE ONLY)  -     Discontinue: Tirzepatide-Weight Management (ZEPBOUND) 2.5 MG/0.5ML Subcutaneous Solution Auto-injector; Inject 2.5 mg into the skin once a week.    Overweight (BMI 25.0-29.9)  -     [facility] Dietician Referral  ([facility] USE ONLY)  -      Discontinue: Tirzepatide-Weight Management (ZEPBOUND) 2.5 MG/0.5ML Subcutaneous Solution Auto-injector; Inject 2.5 mg into the skin once a week.    Hyperlipidemia, unspecified hyperlipidemia type  -     [facility] Dietician Referral  ([facility] USE ONLY)  -     Discontinue: Tirzepatide-Weight Management (ZEPBOUND) 2.5 MG/0.5ML Subcutaneous Solution Auto-injector; Inject 2.5 mg into the skin once a week.    Other forms of systemic lupus erythematosus, unspecified organ involvement status (HCC)  -     [facility] Dietician Referral  ([facility] USE ONLY)  -     Discontinue: Tirzepatide-Weight Management (ZEPBOUND) 2.5 MG/0.5ML Subcutaneous Solution Auto-injector; Inject 2.5 mg into the skin once a week.    Current chronic use of systemic steroids  -     [facility] Dietician Referral  ([facility] USE ONLY)  -     Discontinue: Tirzepatide-Weight Management (ZEPBOUND) 2.5 MG/0.5ML Subcutaneous Solution Auto-injector; Inject 2.5 mg into the skin once a week.    Antiphospholipid syndrome (HCC)  -     [facility] Dietician Referral  ([facility] USE ONLY)  -     Discontinue: Tirzepatide-Weight Management (ZEPBOUND) 2.5 MG/0.5ML Subcutaneous Solution Auto-injector; Inject 2.5 mg into the skin once a week.    History of gestational diabetes        PLAN  Initial Weight: 153 lbs  Initial Weight Date: 05/19/25  Today's Weight: 153 lbs  5% Goal: 7.65  10% Goal: 15.3  Total Weight Loss: 0 lbs    Will begin Zepbound 2.5mg weekly - denies any personal or family history of pancreatitis, pancreatic cancer, thyroid cancer, MEN2 - discussed MOA, advised side effects and adverse effects of medication.  HLD - stable on current medication crestor, will continue, will continue to work on lifestyle modifications  SLE - stable on current medication, lower impact exercise  Referral for dietician  Begin logging foods - discussed macronutrient goals  -low carb high protein  -portion control  -Limit carbohydrates  -Eat breakfast every day   -Don't skip meals   -Read nutrition labels and keep a food  log  -drink a lot of water 65 oz of water per day  - Do not drink your calories (no soda, juice, high calorie coffee drinks, limit alcohol)  - Do not eat late at night  Medication use and side effects reviewed with patient.  Medication contraindications: concerned with stimulant, topamax - h/o kidney stones  Follow up with dietitian and psychologist as recommended.  Discussed the role of sleep and stress in weight management.  Labs orders as above.  Counseled on comprehensive weight loss plan including attention to nutrition, exercise and behavior/stress management for success. See patient instruction below for more details.  Weight Loss Consent to treat reviewed and signed.    Total time spent on chart review, pre-charting, obtaining history, counseling, and educating, reviewing labs was 45 minutes.      Patient Instructions   1200 - 1300 calories a day    Moderate Carb (30/35/35)  115g  Protein    60g  Fats    134g  Carbs    Lower Carb (40/40/20)  153g  Protein    68g  Fats    77g  Carbs    We are here to support you with weight loss, but please remember that you still need your primary care provider for your routine health maintenance.      [facility]:  [facility]  [facility]erral for [facility]  Follow up with me in 4 months  Schedule follow up appointments: Jean Macias (dietitian) or Herlinda Andino (presurgery dietitian)   Check for insurance coverage for dietitian and labwork prior to scheduling appointment.     Please try to work on the following dietary changes:  Goals: Aim for 20-30 grams of protein/ meal  Eat 4-6 vegetables/day  Avoid skipping meals- eat every 4-5 hours  Aim for 3 meals/day  2. Drink lots of water and cut down on soda/juice consumption if soda/juice drinker  3. Focus on protein: (15-30 grams with each meal) ie. greek yogurt, cottage cheese, string cheese, hard boiled eggs  4. Healthy snacks: peanut butter and apples, hummus and carrots, berries, nuts (1/4 cup), tuna and crackers                  Protein Shakes: Premier protein or Core Power                Protein Bars: Rx Bars, Oatmega, Power Crunch                 [place] balanced breaks (cheese and nuts)- without chocolate  5. Reduce carbohydrates which includes sweets as well as rice, pasta, potatoes, bread, corn and instead choose whole grain options or more protein or vegetables (4-6 servings of vegetables per day)  6. Get a good night of sleep  7. Try to decrease stress in life     Please download apps:  1. My Fitness Pal, Lose it, My Net Diary christy to help you to monitor daily dietary intake and you will be able to see if you are eating the right amount of calories, protein, carbs                With My Fitness Pal-->When you set-up the christy or need to adjust settings:                Goals should include:                 Lose 1.5-2 lbs per week                Activity level: not very active (can't count exercise towards calorie number per day)                   ** Daily INPUT> Look at nutrition section-- \"nutrients\" and it will break down your macros for the day (ie. Protein, carbs, fibers, sugars and fats). Try to stay within these numbers daily     2. \"7 minute workout\" to help with exercise/activity which takes 7 minutes of your day and that you can do at home!   3. \"Calm\" or \"Headspace\" which helps with mindfulness, meditation, clarity, sleep, and ashley to your daily life.   4. [employer] blog for healthy recipe ideas  5. [employer] for low carb resources    HIGH PROTEIN SNACK IDEAS  -cottage cheese  -plain yogurt  -kefir  -hard-boiled eggs  -natural cheeses  -nuts (measure portion size)   -unsweetened nut butters  -dried edamame   -mariia seeds soaked in water or almond milk  -soy nuts  -cured meats (monitor for sodium issues)   -hummus with vegetables  -bean dip with vegetables     FRUIT  Low carb fruit options   Raspberries: Half a cup (60 grams) contains 3 grams of carbs.  Blackberries: Half a cup (70 grams) contains 4 grams of  carbs.  Strawberries: Half a cup (100 grams) contains 6 grams of carbs.  Blueberries: Half a cup (50 grams) contains 6 grams of carbs.  Plum: One medium-sized (80 grams) contains 6 grams of carbs.     VEGETABLES  Low carb vegetables              Delicious and Healthy Snacks     Tomato and cheese plate--mix 1/2 cup   cherry tomatoes, 1 cup fresh mini mozzarella   balls, ½ cup olives     Cottage cheese & berries--top ½ cup of   cottage cheese with 1 cup of berries of   choice. Add one handful of pecans for some   extra protein, fat and fiber.      Apple + Greek Yogurt + Nut butter*--cut up   an apple and dip in 1/3 cup of Greek yogurt   and 2 tbsp of nut butter.     Hummus & veggies--dip baby carrots, pepper   strips, or snap peas in ¼ cup hummus.     Nuts--munch on 1oz of any kind of nut (about   ¼ cup). In the shell will help to slow down!   Carrots and cheese plate-- 1 cup of baby   carrots (or veg of choice) with ½ cup cheddar   cheese cubes and 2-4 low sodium, nitrate   free turkey slices     Yogurt & berries--mix ½-1 cup berries in a   6oz container of plain or low sugar fruit   flavored 2% Greek yogurt (less than15 grams   of sugar per serving). Chobani (Less Sugar) or   Siggis are examples.     Hard boiled egg & fruit--1 to 2 hardboiled   eggs and a tangerine or other small serving   of fruit     Tuna & avocado--put 2oz of tuna (one pouch)   on 1/3 of an avocado or 2 tablespoons   guacamole and top with salsa     String cheese & veggies--one piece cheese   (¾ oz) and 1 cup snap peas or other   vegetables     Cauliflower rice & cheese--sprinkle ¼ cup   shredded cheese on 1 cup cauliflower rice   and microwave until cheese melts     Deviled eggs--3 deviled egg halves     Bean dip & veggies- ½ cup refried beans   with ¼ cup shredded cheese melted on   top. Use as a dip for celery or red pepper   strips or eat plain.     [place] Balanced Breaks-or make your   own with ½ oz nuts, ½ oz cheese, and 1   teaspoon  dried fruit.     Edamame with fruit and nuts--1-2   mandarin orange, ¼ cup cashews, ¼ cup   edamame      Low fat chicken, egg, or tuna salad--mix   2oz chicken, tuna, or with 1 teaspoon   mayonnaise, 1 teaspoon Salazar mustard,   and 1 teaspoon plain yogurt. Add chopped   celery, onions, walnuts, Granny Smith   apples, or dried cranberries for extra flavor.     [place] break--turkey, chicken, or nut   butter on 1 slice whole grain bread.     Protein Shake--Ruano, Core Power, Orgain,   Premier Protein, Fairlife     Protein Bar--Quest, Oatmega, RX Bar, [place]   Bars     Protein Chips - Legendary and Quest chips  These snacks contain protein,   fiber and fat to keep you full and   energized!   *If you have a peanut allergy, you can substitute with   Sun Butter (made from sunflower seeds) or WOW butter   (made from soy    No follow-ups on file.    Patient verbalizes understanding.    Patricia Paredes PA-C  5/19/2025         [1]   Current Outpatient Medications on File Prior to Visit   Medication Sig Dispense Refill    predniSONE 1 MG Oral Tab Take 3 tablets (3 mg total) by mouth daily with breakfast. 270 tablet 1    hydroxychloroquine 200 MG Oral Tab Take 1 tablet (200 mg total) by mouth 3 (three) times a week. Tue, Thu and Sat. Take 2 tablets (400 mg total) by mouth 4 (four) times a week. Mon, Wed, Fri, Sun. 180 tablet 1    CRESTOR 5 MG Oral Tab Take 1 tablet (5 mg total) by mouth nightly. HAS NOT STARTED YET - 7/23      famotidine 40 MG Oral Tab Take 1 tablet (40 mg total) by mouth daily. 90 tablet 0    Bimatoprost 0.03 % External Solution 1 Application nightly.      albuterol 108 (90 Base) MCG/ACT Inhalation Aero Soln Inhale 2 puffs into the lungs every 4 to 6 hours. As needed      NAPROXEN 500 MG Oral Tab TAKE 1 TABLET BY MOUTH TWICE A DAY WITH MEALS 60 tablet 1    PEG 3350-KCl-Na Bicarb-NaCl 420 g Oral Recon Soln Take as directed by physician (Patient not taking: Reported on 5/27/2025) 4000 mL 0     No current  facility-administered medications on file prior to visit.

## 2025-05-19 NOTE — PATIENT INSTRUCTIONS
1200 - 1300 calories a day    Moderate Carb (30/35/35)  115g  Protein    60g  Fats    134g  Carbs    Lower Carb (40/40/20)  153g  Protein    68g  Fats    77g  Carbs    We are here to support you with weight loss, but please remember that you still need your primary care provider for your routine health maintenance.      PLAN:  Will begin zepbound  Referral for dietician  Follow up with me in 4 months  Schedule follow up appointments: Jean Macias (dietitian) or Herlinda Andino (presurgery dietitian)   Check for insurance coverage for dietitian and labwork prior to scheduling appointment.     Please try to work on the following dietary changes:  Goals: Aim for 20-30 grams of protein/ meal  Eat 4-6 vegetables/day  Avoid skipping meals- eat every 4-5 hours  Aim for 3 meals/day  2. Drink lots of water and cut down on soda/juice consumption if soda/juice drinker  3. Focus on protein: (15-30 grams with each meal) ie. greek yogurt, cottage cheese, string cheese, hard boiled eggs  4. Healthy snacks: peanut butter and apples, hummus and carrots, berries, nuts (1/4 cup), tuna and crackers                 Protein Shakes: Premier protein or Core Power                Protein Bars: Rx Bars, Oatmega, Power Crunch                 [place] balanced breaks (cheese and nuts)- without chocolate  5. Reduce carbohydrates which includes sweets as well as rice, pasta, potatoes, bread, corn and instead choose whole grain options or more protein or vegetables (4-6 servings of vegetables per day)  6. Get a good night of sleep  7. Try to decrease stress in life     Please download apps:  1. My Fitness Pal, Lose it, My Net Diary christy to help you to monitor daily dietary intake and you will be able to see if you are eating the right amount of calories, protein, carbs                With My Fitness Pal-->When you set-up the christy or need to adjust settings:                Goals should include:                 Lose 1.5-2 lbs per week                 Activity level: not very active (can't count exercise towards calorie number per day)                   ** Daily INPUT> Look at nutrition section-- \"nutrients\" and it will break down your macros for the day (ie. Protein, carbs, fibers, sugars and fats). Try to stay within these numbers daily     2. \"7 minute workout\" to help with exercise/activity which takes 7 minutes of your day and that you can do at home!   3. \"Calm\" or \"Headspace\" which helps with mindfulness, meditation, clarity, sleep, and ashley to your daily life.   4. [employer] blog for healthy recipe ideas  5. [employer] for low carb resources    HIGH PROTEIN SNACK IDEAS  -cottage cheese  -plain yogurt  -kefir  -hard-boiled eggs  -natural cheeses  -nuts (measure portion size)   -unsweetened nut butters  -dried edamame   -mariia seeds soaked in water or almond milk  -soy nuts  -cured meats (monitor for sodium issues)   -hummus with vegetables  -bean dip with vegetables     FRUIT  Low carb fruit options   Raspberries: Half a cup (60 grams) contains 3 grams of carbs.  Blackberries: Half a cup (70 grams) contains 4 grams of carbs.  Strawberries: Half a cup (100 grams) contains 6 grams of carbs.  Blueberries: Half a cup (50 grams) contains 6 grams of carbs.  Plum: One medium-sized (80 grams) contains 6 grams of carbs.     VEGETABLES  Low carb vegetables              Delicious and Healthy Snacks     Tomato and cheese plate--mix 1/2 cup   cherry tomatoes, 1 cup fresh mini mozzarella   balls, ½ cup olives     Cottage cheese & berries--top ½ cup of   cottage cheese with 1 cup of berries of   choice. Add one handful of pecans for some   extra protein, fat and fiber.      Apple + Greek Yogurt + Nut butter*--cut up   an apple and dip in 1/3 cup of Greek yogurt   and 2 tbsp of nut butter.     Hummus & veggies--dip baby carrots, pepper   strips, or snap peas in ¼ cup hummus.     Nuts--munch on 1oz of any kind of nut (about   ¼ cup). In the shell will help to slow  down!   Carrots and cheese plate-- 1 cup of baby   carrots (or veg of choice) with ½ cup cheddar   cheese cubes and 2-4 low sodium, nitrate   free turkey slices     Yogurt & berries--mix ½-1 cup berries in a   6oz container of plain or low sugar fruit   flavored 2% Greek yogurt (less than15 grams   of sugar per serving). Chobani (Less Sugar) or   Siggis are examples.     Hard boiled egg & fruit--1 to 2 hardboiled   eggs and a tangerine or other small serving   of fruit     Tuna & avocado--put 2oz of tuna (one pouch)   on 1/3 of an avocado or 2 tablespoons   guacamole and top with salsa     String cheese & veggies--one piece cheese   (¾ oz) and 1 cup snap peas or other   vegetables     Cauliflower rice & cheese--sprinkle ¼ cup   shredded cheese on 1 cup cauliflower rice   and microwave until cheese melts     Deviled eggs--3 deviled egg halves     Bean dip & veggies- ½ cup refried beans   with ¼ cup shredded cheese melted on   top. Use as a dip for celery or red pepper   strips or eat plain.     [place] Balanced Breaks-or make your   own with ½ oz nuts, ½ oz cheese, and 1   teaspoon dried fruit.     Edamame with fruit and nuts--1-2   mandarin orange, ¼ cup cashews, ¼ cup   edamame      Low fat chicken, egg, or tuna salad--mix   2oz chicken, tuna, or with 1 teaspoon   mayonnaise, 1 teaspoon Salazar mustard,   and 1 teaspoon plain yogurt. Add chopped   celery, onions, walnuts, Granny Smith   apples, or dried cranberries for extra flavor.     [place] break--turkey, chicken, or nut   butter on 1 slice whole grain bread.     Protein Shake--Ruano, Core Power, Orgain,   Premier Protein, Fairlife     Protein Bar--Quest, Oatmega, RX Bar, [place]   Bars     Protein Chips - Legendary and Quest chips  These snacks contain protein,   fiber and fat to keep you full and   energized!   *If you have a peanut allergy, you can substitute with   Sun Butter (made from sunflower seeds) or WOW butter   (made from soy

## 2025-05-20 ENCOUNTER — PATIENT MESSAGE (OUTPATIENT)
Dept: RHEUMATOLOGY | Facility: CLINIC | Age: 55
End: 2025-05-20

## 2025-05-20 DIAGNOSIS — R79.89 HIGH SERUM CREATININE: ICD-10-CM

## 2025-05-20 DIAGNOSIS — M32.8 OTHER FORMS OF SYSTEMIC LUPUS ERYTHEMATOSUS, UNSPECIFIED ORGAN INVOLVEMENT STATUS (HCC): Primary | ICD-10-CM

## 2025-05-21 RX ORDER — METFORMIN HYDROCHLORIDE 750 MG/1
750 TABLET, EXTENDED RELEASE ORAL DAILY
Qty: 90 TABLET | Refills: 0 | Status: SHIPPED | OUTPATIENT
Start: 2025-05-21

## 2025-05-24 LAB
ABSOLUTE BASOPHILS: 61 CELLS/UL (ref 0–200)
ABSOLUTE EOSINOPHILS: 43 CELLS/UL (ref 15–500)
ABSOLUTE LYMPHOCYTES: 1476 CELLS/UL (ref 850–3900)
ABSOLUTE MONOCYTES: 671 CELLS/UL (ref 200–950)
ABSOLUTE NEUTROPHILS: 3849 CELLS/UL (ref 1500–7800)
ALBUMIN/GLOBULIN RATIO: 2 (CALC) (ref 1–2.5)
ALBUMIN: 4.3 G/DL (ref 3.6–5.1)
ALKALINE PHOSPHATASE: 104 U/L (ref 37–153)
ALT: 25 U/L (ref 6–29)
APPEARANCE: CLEAR
AST: 26 U/L (ref 10–35)
BASOPHILS: 1 %
BILIRUBIN, TOTAL: 0.6 MG/DL (ref 0.2–1.2)
BILIRUBIN: NEGATIVE
BUN/CREATININE RATIO: 22 (CALC) (ref 6–22)
BUN: 24 MG/DL (ref 7–25)
C-REACTIVE PROTEIN: <3 MG/L
CALCIUM: 9.1 MG/DL (ref 8.6–10.4)
CARBON DIOXIDE: 27 MMOL/L (ref 20–32)
CHLORIDE: 100 MMOL/L (ref 98–110)
COLOR: YELLOW
COMPLEMENT COMPONENT C3C: 106 MG/DL (ref 83–193)
COMPLEMENT COMPONENT C4C: 20 MG/DL (ref 15–57)
CREATININE: 1.07 MG/DL (ref 0.5–1.03)
EGFR: 62 ML/MIN/1.73M2
EOSINOPHILS: 0.7 %
GLOBULIN: 2.2 G/DL (CALC) (ref 1.9–3.7)
GLUCOSE: 80 MG/DL (ref 65–99)
GLUCOSE: NEGATIVE
HEMATOCRIT: 42.9 % (ref 35–45)
HEMOGLOBIN: 14.2 G/DL (ref 11.7–15.5)
IMMUNOGLOBULIN A: 159 MG/DL (ref 47–310)
IMMUNOGLOBULIN G: 875 MG/DL (ref 600–1640)
IMMUNOGLOBULIN M: 153 MG/DL (ref 50–300)
KETONES: NEGATIVE
LYMPHOCYTES: 24.2 %
MCH: 30 PG (ref 27–33)
MCHC: 33.1 G/DL (ref 32–36)
MCV: 90.7 FL (ref 80–100)
MONOCYTES: 11 %
MPV: 10.4 FL (ref 7.5–12.5)
NEUTROPHILS: 63.1 %
NITRITE: NEGATIVE
OCCULT BLOOD: NEGATIVE
PH: 6 (ref 5–8)
PLATELET COUNT: 177 THOUSAND/UL (ref 140–400)
POTASSIUM: 4.2 MMOL/L (ref 3.5–5.3)
PROTEIN, TOTAL: 6.5 G/DL (ref 6.1–8.1)
PROTEIN: NEGATIVE
RDW: 12.6 % (ref 11–15)
RED BLOOD CELL COUNT: 4.73 MILLION/UL (ref 3.8–5.1)
SED RATE BY MODIFIED$WESTERGREN: 6 MM/H
SODIUM: 137 MMOL/L (ref 135–146)
SPECIFIC GRAVITY: 1.01 (ref 1–1.03)
WHITE BLOOD CELL COUNT: 6.1 THOUSAND/UL (ref 3.8–10.8)

## 2025-05-27 ENCOUNTER — OFFICE VISIT (OUTPATIENT)
Dept: FAMILY MEDICINE CLINIC | Facility: CLINIC | Age: 55
End: 2025-05-27
Payer: COMMERCIAL

## 2025-05-27 VITALS
RESPIRATION RATE: 16 BRPM | SYSTOLIC BLOOD PRESSURE: 102 MMHG | HEIGHT: 64.5 IN | TEMPERATURE: 98 F | OXYGEN SATURATION: 99 % | BODY MASS INDEX: 25.3 KG/M2 | WEIGHT: 150 LBS | HEART RATE: 74 BPM | DIASTOLIC BLOOD PRESSURE: 70 MMHG

## 2025-05-27 DIAGNOSIS — R10.31 RIGHT LOWER QUADRANT ABDOMINAL PAIN: Primary | ICD-10-CM

## 2025-05-27 PROCEDURE — 99213 OFFICE O/P EST LOW 20 MIN: CPT | Performed by: FAMILY MEDICINE

## 2025-05-27 NOTE — PATIENT INSTRUCTIONS
Thank you for choosing Odilon Alcaraz MD at [facility]  To Do: Brenda Castro  1. Please see below   Call [phone] to schedule the appointment.   Please signup for [employer], which is electronic access to your record if you have not done so.  All your results will post on there.  [email or web address]   You can NOW use Sociocast to book your appointments with us, or consider using open access scheduling which is through the [employer] website [email or web address] and type in Odilon Alcaraz MD and follow the links for \"Schedule Online Now\"    To schedule Imaging or tests at [facility] call Central Scheduling [phone], Go to [facility] ER Building (For example: CT scans, X rays, Ultrasound, MRI)  Cardiac Testing in ER building Building A second floor Cardiac Testing [phone] (For example: Holter Monitor, Cardiac Stress tests,Event Monitor, or 2D Echocardiograms)  [facility] call [phone] usually in [facility]  Walk in Clinic in [place] at [address] Mon-Fri at 8am-7:30 p.m., and Sat/Sun 9:00a.m.-4:30 p.m.  Also at [address] [place] [address]  Call [phone] for info     Please call our office about any questions regarding your treatment/medicines/tests as a result of today's visit.  For your safety, read the entire package insert of all medicines prescribed to you and be aware of all of the risks of treatment even beyond those discussed today.  All therapies have potential risk of harm or side effects or medication interactions.  It is your duty and for your safety to discuss with the pharmacist and our office with questions, and to notify us and stop treatment if problems arise, but know that our intention is that the benefits outweigh those potential risks and we strive to make you healthier and to improve your quality of life.    Referrals, and Radiology Information:    If your insurance requires a referral to a specialist, please allow 5 business days to  process your referral request.    If Odilon Alcaraz MD orders a CT or MRI, it may take up to 10 business days to receive approval from your insurance company. Once our office has called informing you that the insurance company approved your testing, please call Central Scheduling at [phone]  Please allow our office 5 business days to contact you regarding any testing results.    Refill policies:   Allow 3 business days for refills; controlled substances may take longer and must be picked up from the office in person.  Narcotic medications can only be filled in 30 day increments and must be refilled at an office visit only.  If your prescription is due for a refill, you may be due for a follow-up appointment.  We cannot refill your maintenance medications at a preventative wellness visit.  To best provide you care, patients receiving maintenance medications need to be seen at least twice a year.

## 2025-05-27 NOTE — PROGRESS NOTES
Subjective:   Patient ID: Brenda Castro is a 55 year old female.    HPI  Ms. Castro is a pleasant 55-year-old female with history of SLE on Methotrexate, history of DVT here today for right lower quadrant pain but localizes the pain also in the right groin area.  This has been ongoing for 5 months.  This seems to be related to when she feels that her bladder and bowels are full.  It gets better when she urinates or have a bowel movement.  Also it will also occur when she moves.  She had seen her gynecologist and had ultrasound done which was normal.  She also mentioned this to her rheumatologist and I did ask her to follow-up with me to check for possible hernia.  No fever no weight loss no cough no chest pain no shortness of breath no nausea no vomiting no blood in the stool or urine.    I had reviewed past medical and family histories together with allergy and medication lists documented.          History/Other:   Review of Systems   Constitutional:  Negative for fatigue and fever.   HENT:  Negative for sore throat and trouble swallowing.    Respiratory:  Negative for cough and shortness of breath.    Cardiovascular:  Negative for chest pain.   Gastrointestinal:  Negative for constipation, diarrhea, nausea and vomiting.   Genitourinary:  Negative for hematuria.   Neurological:  Negative for dizziness, weakness and headaches.     Current Medications[1]  Allergies:Allergies[2]    Objective:   Physical Exam  Vitals reviewed.   Constitutional:       General: She is not in acute distress.  HENT:      Mouth/Throat:      Mouth: Mucous membranes are moist.      Pharynx: Oropharynx is clear.   Eyes:      General: No scleral icterus.  Cardiovascular:      Rate and Rhythm: Normal rate and regular rhythm.      Heart sounds: Normal heart sounds. No murmur heard.  Pulmonary:      Effort: Pulmonary effort is normal. No respiratory distress.      Breath sounds: Normal breath sounds. No wheezing or rales.   Abdominal:       General: Bowel sounds are normal. There is no distension.      Palpations: Abdomen is soft. There is no mass.      Tenderness: There is no abdominal tenderness. There is no guarding or rebound.      Hernia: No hernia is present.   Musculoskeletal:      Cervical back: Neck supple.      Right lower leg: No edema.      Left lower leg: No edema.   Lymphadenopathy:      Cervical: No cervical adenopathy.   Skin:     General: Skin is warm.   Neurological:      Mental Status: She is alert.   Psychiatric:         Mood and Affect: Mood normal.         Behavior: Behavior normal.         Assessment & Plan:   1. Right lower quadrant abdominal pain      -Musculoskeletal versus GI  - Trial of over-the-counter naproxen twice a day with food for the next 2 weeks  - I had ordered CT scan of the abdomen pelvis if with no improvement      This note was prepared using Dragon Medical voice recognition dictation software. As a result errors may occur. When identified these errors have been corrected. While every attempt is made to correct errors during dictation discrepancies may still exist          No orders of the defined types were placed in this encounter.      Meds This Visit:  Requested Prescriptions      No prescriptions requested or ordered in this encounter       Imaging & Referrals:  CT ABDOMEN+PELVIS(CPT=74176)         [1]   Current Outpatient Medications   Medication Sig Dispense Refill    metFORMIN  MG Oral Tablet 24 Hr Take 1 tablet (750 mg total) by mouth daily. 90 tablet 0    predniSONE 1 MG Oral Tab Take 3 tablets (3 mg total) by mouth daily with breakfast. 270 tablet 1    hydroxychloroquine 200 MG Oral Tab Take 1 tablet (200 mg total) by mouth 3 (three) times a week. Tue, Thu and Sat. Take 2 tablets (400 mg total) by mouth 4 (four) times a week. Mon, Wed, Fri, Sun. 180 tablet 1    CRESTOR 5 MG Oral Tab Take 1 tablet (5 mg total) by mouth nightly. HAS NOT STARTED YET - 7/23      famotidine 40 MG Oral Tab Take  1 tablet (40 mg total) by mouth daily. 90 tablet 0    Bimatoprost 0.03 % External Solution 1 Application nightly.      albuterol 108 (90 Base) MCG/ACT Inhalation Aero Soln Inhale 2 puffs into the lungs every 4 to 6 hours. As needed      NAPROXEN 500 MG Oral Tab TAKE 1 TABLET BY MOUTH TWICE A DAY WITH MEALS 60 tablet 1    PEG 3350-KCl-Na Bicarb-NaCl 420 g Oral Recon Soln Take as directed by physician (Patient not taking: Reported on 5/27/2025) 4000 mL 0   [2]   Allergies  Allergen Reactions    Doxycycline      Sensitivity to light

## 2025-06-17 ENCOUNTER — PATIENT MESSAGE (OUTPATIENT)
Facility: CLINIC | Age: 55
End: 2025-06-17

## 2025-06-22 NOTE — TELEPHONE ENCOUNTER
It may have been something she ate, I would recommend taking a good probiotic, VSL #3, Align or Florastor

## 2025-07-31 PROBLEM — Z86.0101 PERSONAL HISTORY OF ADENOMATOUS AND SERRATED COLON POLYPS: Status: ACTIVE | Noted: 2025-07-31

## 2025-08-15 DIAGNOSIS — M32.9 SYSTEMIC LUPUS ERYTHEMATOSUS, UNSPECIFIED SLE TYPE, UNSPECIFIED ORGAN INVOLVEMENT STATUS (HCC): ICD-10-CM

## 2025-08-15 RX ORDER — HYDROXYCHLOROQUINE SULFATE 200 MG/1
TABLET, FILM COATED ORAL
Qty: 180 TABLET | Refills: 1 | OUTPATIENT
Start: 2025-08-15

## 2025-08-22 ENCOUNTER — TELEMEDICINE (OUTPATIENT)
Facility: CLINIC | Age: 55
End: 2025-08-22

## 2025-08-22 DIAGNOSIS — Z51.81 ENCOUNTER FOR THERAPEUTIC DRUG LEVEL MONITORING: Primary | ICD-10-CM

## 2025-08-22 DIAGNOSIS — E78.5 HYPERLIPIDEMIA, UNSPECIFIED HYPERLIPIDEMIA TYPE: ICD-10-CM

## 2025-08-22 DIAGNOSIS — E66.3 OVERWEIGHT (BMI 25.0-29.9): ICD-10-CM

## 2025-08-22 RX ORDER — PHENTERMINE HYDROCHLORIDE 15 MG/1
15 CAPSULE ORAL EVERY MORNING
Qty: 30 CAPSULE | Refills: 2 | Status: SHIPPED | OUTPATIENT
Start: 2025-08-22

## (undated) DIAGNOSIS — M32.9 SYSTEMIC LUPUS ERYTHEMATOSUS, UNSPECIFIED SLE TYPE, UNSPECIFIED ORGAN INVOLVEMENT STATUS (HCC): Primary | ICD-10-CM

## (undated) DIAGNOSIS — R93.89 ABNORMAL CT SCAN, CHEST: ICD-10-CM

## (undated) DIAGNOSIS — R93.3 ABNORMAL CT SCAN, SMALL BOWEL: ICD-10-CM

## (undated) DIAGNOSIS — R06.02 SHORTNESS OF BREATH: ICD-10-CM

## (undated) NOTE — Clinical Note
Please work on PA for CT Chest w contrast and let pt know when able to schedule.  Disregard other other for ct chest w/wo contrast.     Zeb Sequeira, DO  [facility]  8/10/2022

## (undated) NOTE — Clinical Note
Please do PA for ECHO and high res CT of lungs and let pt know when able to schedule.  SHEELA Yousif Rheumat[facility]3/23/2020

## (undated) NOTE — LETTER
10/14/21    Patient: Jose Miguel Booth  : 1970 Visit date: 10/14/2021    Dear  Alba Reynoso MD    Thank you for referring Jose Miguel Booth to my practice. Please find my assessment and plan below.     Assessment   Thrombosed external h

## (undated) NOTE — MR AVS SNAPSHOT
[facility] [address]  [facility]  [phone]               Thank you for choosing us for your health care visit with Kishor Delarosa PA-C.   We are glad to serve you and happy to provide you office. Patients should call the lab for appointments and hours details.    The Lab phone is the same: [phone]  • Please signup for [facility] CHART, which is electronic access to your record if you have not done so.  All your results will post on there.  Htt If Serenity Summers PA-C orders a CT or MRI, it may take up to 10 business days to receive approval from your insurance company.  Once our office has called informing you that the insurance company approved your testing, please call Central Scheduling at 6 These medications were sent to [facility]/PH[facility]ACY #[id number]- [address], [phone], [address]  [facility] [address]     Phone:  [phone]    - amoxicillin 875 MG Tabs            My

## (undated) NOTE — Clinical Note
I had the pleasure of seeing Tanesha Peralta on 4/29/2020. Please see my attached note. Priscilla Fernández MD [facility]Surgery

## (undated) NOTE — LETTER
10/07/21    Patient: Luna Kim  : 1970 Visit date: 10/7/2021    Dear  Santa London MD    Thank you for referring Luna Kim to my practice. Please find my assessment and plan below.        Assessment   Thrombosed external evidence that it is shrinking in size. There is a clot within the hemorrhoid. There is no evidence of any infection. There are no abscesses, fistula, or other external hemorrhoids.     I discussed the patient that she does have a thrombosed right posteri

## (undated) NOTE — LETTER
10/11/18        [phone]      Dear Luis markham,    [facility] records indicate that you have outstanding lab work and or testing that was ordered for you and has not yet been completed:  Orders Placed This En

## (undated) NOTE — Clinical Note
Owen Lopez! Thank you for referring Ms. Marisol Mccormick for rheumatologic evaluation. Please see the discussion portion of my note and let me know if you have any questions.  SHEELA Shearer [facility]3/23/2020

## (undated) NOTE — ED AVS SNAPSHOT
Guy Bloom   MRN: [id number]    Department:  THE [facility] Emergency Department in [place]   Date of Visit:  9/9/2017           Disclosure     Insurance plans vary and the physician(s) referred by the ER may not be covered by your plan.  Please conta If you have been prescribed any medication(s), please fill your prescription right away and begin taking the medication(s) as directed    If the emergency physician has read X-rays, these will be re-interpreted by a radiologist.  If there is a significant

## (undated) NOTE — LETTER
Date: 11/4/2019    Patient Name: Kaushik Anthony          To Whom it may concern: This letter has been written at the patient's request. The above patient was seen at the [facility] for treatment of a medical condition.     This patient s

## (undated) NOTE — MR AVS SNAPSHOT
[facility] [address]  [facility]  [phone]               Thank you for choosing us for your health care visit with La Rios PA-C.   We are glad to serve you and happy to provide you 3. Pt to follow-up with GI, as scheduled    • Please signup for MY CHART, which is electronic access to your record if you have not done so.  All your results will post on there.  [email or web address]  • You can NOW use the [employer] to book your appo business days to process your referral request.    If Dulce Maria Encarnacion PA-C orders a CT or MRI, it may take up to 10 business days to receive approval from your insurance company.  Once our office has called informing you that the insurance company approve predniSONE 1 MG Tabs   Take 5 mg by mouth daily. Commonly known as:  DELTASONE           VITAMIN D OR   Take 4,000 Units by mouth daily.                    MyChart     Visit MyChart  You can access your MyChart to more actively manage your health care an

## (undated) NOTE — Clinical Note
I had the pleasure of seeing Vivian Shah on 3/17/2021. Please see my attached note.     Janna Odom MD FACS  [facility]